# Patient Record
Sex: MALE | Race: WHITE
[De-identification: names, ages, dates, MRNs, and addresses within clinical notes are randomized per-mention and may not be internally consistent; named-entity substitution may affect disease eponyms.]

---

## 2017-12-02 ENCOUNTER — HOSPITAL ENCOUNTER (EMERGENCY)
Dept: HOSPITAL 8 - ED | Age: 27
LOS: 1 days | Discharge: HOME | End: 2017-12-03
Payer: COMMERCIAL

## 2017-12-02 VITALS — HEIGHT: 69 IN | BODY MASS INDEX: 25.18 KG/M2 | WEIGHT: 169.98 LBS

## 2017-12-02 DIAGNOSIS — B34.9: Primary | ICD-10-CM

## 2017-12-02 DIAGNOSIS — N28.9: ICD-10-CM

## 2017-12-02 LAB
HCT VFR BLD CALC: 56.3 % (ref 39.2–51.8)
HGB BLD-MCNC: 19.2 G/DL (ref 13.7–18)
WBC # BLD AUTO: 10.7 X10^3/UL (ref 3.4–10)

## 2017-12-02 PROCEDURE — 96361 HYDRATE IV INFUSION ADD-ON: CPT

## 2017-12-02 PROCEDURE — 80053 COMPREHEN METABOLIC PANEL: CPT

## 2017-12-02 PROCEDURE — 85025 COMPLETE CBC W/AUTO DIFF WBC: CPT

## 2017-12-02 PROCEDURE — 96374 THER/PROPH/DIAG INJ IV PUSH: CPT

## 2017-12-02 PROCEDURE — 36415 COLL VENOUS BLD VENIPUNCTURE: CPT

## 2017-12-02 PROCEDURE — 84132 ASSAY OF SERUM POTASSIUM: CPT

## 2017-12-02 PROCEDURE — 99285 EMERGENCY DEPT VISIT HI MDM: CPT

## 2017-12-02 PROCEDURE — 96375 TX/PRO/DX INJ NEW DRUG ADDON: CPT

## 2017-12-03 VITALS — DIASTOLIC BLOOD PRESSURE: 62 MMHG | SYSTOLIC BLOOD PRESSURE: 95 MMHG

## 2017-12-03 LAB
AST SERPL-CCNC: 32 U/L (ref 15–37)
BUN SERPL-MCNC: 18 MG/DL (ref 7–18)

## 2018-02-14 ENCOUNTER — HOSPITAL ENCOUNTER (OUTPATIENT)
Dept: HOSPITAL 8 - OUT | Age: 28
End: 2018-02-14
Attending: ORTHOPAEDIC SURGERY
Payer: MEDICAID

## 2018-02-14 VITALS — DIASTOLIC BLOOD PRESSURE: 78 MMHG | SYSTOLIC BLOOD PRESSURE: 118 MMHG

## 2018-02-14 VITALS — WEIGHT: 167.99 LBS | HEIGHT: 69 IN | BODY MASS INDEX: 24.88 KG/M2
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
## 2018-02-14 DIAGNOSIS — S42.002A: Primary | ICD-10-CM
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
## 2018-02-14 PROCEDURE — 76000 FLUOROSCOPY <1 HR PHYS/QHP: CPT

## 2018-02-14 PROCEDURE — 73000 X-RAY EXAM OF COLLAR BONE: CPT

## 2018-02-14 PROCEDURE — 23515 OPTX CLAVICULAR FX W/INT FIX: CPT

## 2018-02-14 PROCEDURE — C1713 ANCHOR/SCREW BN/BN,TIS/BN: HCPCS

## 2018-10-01 ENCOUNTER — HOSPITAL ENCOUNTER (EMERGENCY)
Dept: HOSPITAL 8 - ED | Age: 28
Discharge: HOME | End: 2018-10-01
Payer: MEDICAID

## 2018-10-01 VITALS — DIASTOLIC BLOOD PRESSURE: 74 MMHG | SYSTOLIC BLOOD PRESSURE: 162 MMHG

## 2018-10-01 VITALS — HEIGHT: 69 IN | WEIGHT: 169.76 LBS | BODY MASS INDEX: 25.14 KG/M2

## 2018-10-01 DIAGNOSIS — R06.02: ICD-10-CM

## 2018-10-01 DIAGNOSIS — F41.1: ICD-10-CM

## 2018-10-01 DIAGNOSIS — R07.89: Primary | ICD-10-CM

## 2018-10-01 LAB
ALBUMIN SERPL-MCNC: 4.1 G/DL (ref 3.4–5)
ALP SERPL-CCNC: 92 U/L (ref 45–117)
ALT SERPL-CCNC: 61 U/L (ref 12–78)
ANION GAP SERPL CALC-SCNC: 8 MMOL/L (ref 5–15)
BASOPHILS # BLD AUTO: 0.06 X10^3/UL (ref 0–0.1)
BASOPHILS NFR BLD AUTO: 1 % (ref 0–1)
BILIRUB SERPL-MCNC: 0.3 MG/DL (ref 0.2–1)
CALCIUM SERPL-MCNC: 9.4 MG/DL (ref 8.5–10.1)
CHLORIDE SERPL-SCNC: 109 MMOL/L (ref 98–107)
CREAT SERPL-MCNC: 1.05 MG/DL (ref 0.7–1.3)
EOSINOPHIL # BLD AUTO: 0.04 X10^3/UL (ref 0–0.4)
EOSINOPHIL NFR BLD AUTO: 1 % (ref 1–7)
ERYTHROCYTE [DISTWIDTH] IN BLOOD BY AUTOMATED COUNT: 13.1 % (ref 9.4–14.8)
LYMPHOCYTES # BLD AUTO: 1.5 X10^3/UL (ref 1–3.4)
LYMPHOCYTES NFR BLD AUTO: 28 % (ref 22–44)
MCH RBC QN AUTO: 31.2 PG (ref 27.5–34.5)
MCHC RBC AUTO-ENTMCNC: 33.9 G/DL (ref 33.2–36.2)
MCV RBC AUTO: 92 FL (ref 81–97)
MD: NO
MONOCYTES # BLD AUTO: 0.48 X10^3/UL (ref 0.2–0.8)
MONOCYTES NFR BLD AUTO: 9 % (ref 2–9)
NEUTROPHILS # BLD AUTO: 3.37 X10^3/UL (ref 1.8–6.8)
NEUTROPHILS NFR BLD AUTO: 62 % (ref 42–75)
PLATELET # BLD AUTO: 251 X10^3/UL (ref 130–400)
PMV BLD AUTO: 7.7 FL (ref 7.4–10.4)
PROT SERPL-MCNC: 7.3 G/DL (ref 6.4–8.2)
RBC # BLD AUTO: 4.99 X10^6/UL (ref 4.38–5.82)

## 2018-10-01 PROCEDURE — 96374 THER/PROPH/DIAG INJ IV PUSH: CPT

## 2018-10-01 PROCEDURE — 71046 X-RAY EXAM CHEST 2 VIEWS: CPT

## 2018-10-01 PROCEDURE — 85025 COMPLETE CBC W/AUTO DIFF WBC: CPT

## 2018-10-01 PROCEDURE — 99285 EMERGENCY DEPT VISIT HI MDM: CPT

## 2018-10-01 PROCEDURE — 36415 COLL VENOUS BLD VENIPUNCTURE: CPT

## 2018-10-01 PROCEDURE — 83690 ASSAY OF LIPASE: CPT

## 2018-10-01 PROCEDURE — 83735 ASSAY OF MAGNESIUM: CPT

## 2018-10-01 PROCEDURE — 80053 COMPREHEN METABOLIC PANEL: CPT

## 2018-10-22 ENCOUNTER — HOSPITAL ENCOUNTER (EMERGENCY)
Dept: HOSPITAL 8 - ED | Age: 28
Discharge: HOME | End: 2018-10-22
Payer: MEDICAID

## 2018-10-22 VITALS — SYSTOLIC BLOOD PRESSURE: 140 MMHG | DIASTOLIC BLOOD PRESSURE: 91 MMHG

## 2018-10-22 VITALS — HEIGHT: 68 IN | WEIGHT: 168.21 LBS | BODY MASS INDEX: 25.49 KG/M2

## 2018-10-22 DIAGNOSIS — R10.84: ICD-10-CM

## 2018-10-22 DIAGNOSIS — R11.2: Primary | ICD-10-CM

## 2018-10-22 DIAGNOSIS — R19.7: ICD-10-CM

## 2018-10-22 DIAGNOSIS — F41.1: ICD-10-CM

## 2018-10-22 LAB
ALBUMIN SERPL-MCNC: 4.6 G/DL (ref 3.4–5)
ALP SERPL-CCNC: 89 U/L (ref 45–117)
ALT SERPL-CCNC: 48 U/L (ref 12–78)
ANION GAP SERPL CALC-SCNC: 5 MMOL/L (ref 5–15)
BASOPHILS # BLD AUTO: 0.03 X10^3/UL (ref 0–0.1)
BASOPHILS NFR BLD AUTO: 1 % (ref 0–1)
BILIRUB SERPL-MCNC: 0.6 MG/DL (ref 0.2–1)
CALCIUM SERPL-MCNC: 9.7 MG/DL (ref 8.5–10.1)
CHLORIDE SERPL-SCNC: 105 MMOL/L (ref 98–107)
CREAT SERPL-MCNC: 1.19 MG/DL (ref 0.7–1.3)
EOSINOPHIL # BLD AUTO: 0.02 X10^3/UL (ref 0–0.4)
EOSINOPHIL NFR BLD AUTO: 0 % (ref 1–7)
ERYTHROCYTE [DISTWIDTH] IN BLOOD BY AUTOMATED COUNT: 12.9 % (ref 9.4–14.8)
LYMPHOCYTES # BLD AUTO: 1.36 X10^3/UL (ref 1–3.4)
LYMPHOCYTES NFR BLD AUTO: 19 % (ref 22–44)
MCH RBC QN AUTO: 31.9 PG (ref 27.5–34.5)
MCHC RBC AUTO-ENTMCNC: 34.5 G/DL (ref 33.2–36.2)
MCV RBC AUTO: 92.5 FL (ref 81–97)
MD: NO
MONOCYTES # BLD AUTO: 0.44 X10^3/UL (ref 0.2–0.8)
MONOCYTES NFR BLD AUTO: 6 % (ref 2–9)
NEUTROPHILS # BLD AUTO: 5.19 X10^3/UL (ref 1.8–6.8)
NEUTROPHILS NFR BLD AUTO: 74 % (ref 42–75)
PLATELET # BLD AUTO: 236 X10^3/UL (ref 130–400)
PMV BLD AUTO: 8.2 FL (ref 7.4–10.4)
PROT SERPL-MCNC: 8.3 G/DL (ref 6.4–8.2)
RBC # BLD AUTO: 5.33 X10^6/UL (ref 4.38–5.82)

## 2018-10-22 PROCEDURE — 83690 ASSAY OF LIPASE: CPT

## 2018-10-22 PROCEDURE — 80053 COMPREHEN METABOLIC PANEL: CPT

## 2018-10-22 PROCEDURE — 99284 EMERGENCY DEPT VISIT MOD MDM: CPT

## 2018-10-22 PROCEDURE — 85025 COMPLETE CBC W/AUTO DIFF WBC: CPT

## 2018-10-22 PROCEDURE — 36415 COLL VENOUS BLD VENIPUNCTURE: CPT

## 2019-11-22 ENCOUNTER — HOSPITAL ENCOUNTER (INPATIENT)
Dept: HOSPITAL 8 - ED | Age: 29
LOS: 5 days | Discharge: HOME | DRG: 872 | End: 2019-11-27
Attending: HOSPITALIST | Admitting: INTERNAL MEDICINE
Payer: MEDICAID

## 2019-11-22 VITALS — BODY MASS INDEX: 23.93 KG/M2 | HEIGHT: 69 IN | WEIGHT: 161.6 LBS

## 2019-11-22 VITALS — DIASTOLIC BLOOD PRESSURE: 73 MMHG | SYSTOLIC BLOOD PRESSURE: 116 MMHG

## 2019-11-22 DIAGNOSIS — A41.9: Primary | ICD-10-CM

## 2019-11-22 DIAGNOSIS — L03.116: ICD-10-CM

## 2019-11-22 DIAGNOSIS — F41.1: ICD-10-CM

## 2019-11-22 LAB
ALBUMIN SERPL-MCNC: 2.8 G/DL (ref 3.4–5)
ANION GAP SERPL CALC-SCNC: 3 MMOL/L (ref 5–15)
BASOPHILS # BLD AUTO: 0.03 X10^3/UL (ref 0–0.1)
BASOPHILS NFR BLD AUTO: 0 % (ref 0–1)
CALCIUM SERPL-MCNC: 8.5 MG/DL (ref 8.5–10.1)
CHLORIDE SERPL-SCNC: 107 MMOL/L (ref 98–107)
CREAT SERPL-MCNC: 1.05 MG/DL (ref 0.7–1.3)
EOSINOPHIL # BLD AUTO: 0 X10^3/UL (ref 0–0.4)
EOSINOPHIL NFR BLD AUTO: 0 % (ref 1–7)
ERYTHROCYTE [DISTWIDTH] IN BLOOD BY AUTOMATED COUNT: 12.5 % (ref 9.4–14.8)
LYMPHOCYTES # BLD AUTO: 0.94 X10^3/UL (ref 1–3.4)
LYMPHOCYTES NFR BLD AUTO: 7 % (ref 22–44)
MCH RBC QN AUTO: 31.8 PG (ref 27.5–34.5)
MCHC RBC AUTO-ENTMCNC: 33.4 G/DL (ref 33.2–36.2)
MCV RBC AUTO: 95.2 FL (ref 81–97)
MD: NO
MONOCYTES # BLD AUTO: 0.93 X10^3/UL (ref 0.2–0.8)
MONOCYTES NFR BLD AUTO: 7 % (ref 2–9)
NEUTROPHILS # BLD AUTO: 12.22 X10^3/UL (ref 1.8–6.8)
NEUTROPHILS NFR BLD AUTO: 87 % (ref 42–75)
PLATELET # BLD AUTO: 223 X10^3/UL (ref 130–400)
PMV BLD AUTO: 8.2 FL (ref 7.4–10.4)
RBC # BLD AUTO: 4.18 X10^6/UL (ref 4.38–5.82)

## 2019-11-22 PROCEDURE — 93005 ELECTROCARDIOGRAM TRACING: CPT

## 2019-11-22 PROCEDURE — 80048 BASIC METABOLIC PNL TOTAL CA: CPT

## 2019-11-22 PROCEDURE — 87205 SMEAR GRAM STAIN: CPT

## 2019-11-22 PROCEDURE — 99285 EMERGENCY DEPT VISIT HI MDM: CPT

## 2019-11-22 PROCEDURE — 80202 ASSAY OF VANCOMYCIN: CPT

## 2019-11-22 PROCEDURE — 80053 COMPREHEN METABOLIC PANEL: CPT

## 2019-11-22 PROCEDURE — 87070 CULTURE OTHR SPECIMN AEROBIC: CPT

## 2019-11-22 PROCEDURE — 85025 COMPLETE CBC W/AUTO DIFF WBC: CPT

## 2019-11-22 PROCEDURE — 87147 CULTURE TYPE IMMUNOLOGIC: CPT

## 2019-11-22 PROCEDURE — A9575 INJ GADOTERATE MEGLUMI 0.1ML: HCPCS

## 2019-11-22 PROCEDURE — 83605 ASSAY OF LACTIC ACID: CPT

## 2019-11-22 PROCEDURE — 36415 COLL VENOUS BLD VENIPUNCTURE: CPT

## 2019-11-22 PROCEDURE — 82040 ASSAY OF SERUM ALBUMIN: CPT

## 2019-11-22 PROCEDURE — 87040 BLOOD CULTURE FOR BACTERIA: CPT

## 2019-11-22 PROCEDURE — 96365 THER/PROPH/DIAG IV INF INIT: CPT

## 2019-11-22 RX ADMIN — MORPHINE SULFATE PRN MG: 4 INJECTION INTRAVENOUS at 21:11

## 2019-11-22 RX ADMIN — MORPHINE SULFATE PRN MG: 4 INJECTION INTRAVENOUS at 19:55

## 2019-11-22 NOTE — NUR
PT AWARE OF ADMISSION. 2ND LITER HANGING. PT WAS RESTING CALMLY DROWSING, STS 
PAIN IS 9/10 WHEN ASKED BUT STS FEELS BETTER AFTER MORPHINE. AS

## 2019-11-22 NOTE — NUR
PT TO ED W/ ROOMMATE. PORCELAIN PLATE EXPLODED WHILE BURNING INCENSE AT HOME 
TWO DAYS AGO, PIECE STRUCK L FOOT. L FOOT VERY SWOLLEN, RED, WARM. 2+ POST TIB 
PULSE. RED STREAKING UP L INNER LEG AND L INNER THIGH. AFEBRILE. 10/10 PAIN. 
SMALL BLACK TIESHA ON TOP OF FOOT. NO EXUDATE. DOES NOT KNOW IF PIECE IS LODGED 
IN FOOT. 

LESIONS/RED SPOTS/CRUSTY YELLOW SCABS TO R WRIST, L FOREARM, R CHIN X2 WEEKS. 
STS GOES TO SPA AND HOT TUB AT [facility] FREQUENTLY.

MD AT BEDSIDE.

LABS AND BC X2

IVF AND ABX AND MS FOR PAIN PER MAR.

AWAITING XR RESULTS

FOOT ELEVATED

ST ON MONITOR. 

CALL BELL IN REACH. CTM. AS

## 2019-11-23 VITALS — SYSTOLIC BLOOD PRESSURE: 112 MMHG | DIASTOLIC BLOOD PRESSURE: 72 MMHG

## 2019-11-23 VITALS — DIASTOLIC BLOOD PRESSURE: 80 MMHG | SYSTOLIC BLOOD PRESSURE: 120 MMHG

## 2019-11-23 VITALS — DIASTOLIC BLOOD PRESSURE: 76 MMHG | SYSTOLIC BLOOD PRESSURE: 141 MMHG

## 2019-11-23 VITALS — SYSTOLIC BLOOD PRESSURE: 130 MMHG | DIASTOLIC BLOOD PRESSURE: 79 MMHG

## 2019-11-23 RX ADMIN — HYDROCODONE BITARTRATE AND ACETAMINOPHEN PRN TAB: 5; 325 TABLET ORAL at 14:44

## 2019-11-23 RX ADMIN — HYDROCODONE BITARTRATE AND ACETAMINOPHEN PRN TAB: 5; 325 TABLET ORAL at 10:41

## 2019-11-23 RX ADMIN — AMPICILLIN SODIUM AND SULBACTAM SODIUM SCH MLS/HR: 2; 1 INJECTION, POWDER, FOR SOLUTION INTRAMUSCULAR; INTRAVENOUS at 08:47

## 2019-11-23 RX ADMIN — AMPICILLIN SODIUM AND SULBACTAM SODIUM SCH MLS/HR: 2; 1 INJECTION, POWDER, FOR SOLUTION INTRAMUSCULAR; INTRAVENOUS at 19:32

## 2019-11-23 RX ADMIN — HYDROCODONE BITARTRATE AND ACETAMINOPHEN PRN TAB: 5; 325 TABLET ORAL at 02:32

## 2019-11-23 RX ADMIN — VANCOMYCIN HYDROCHLORIDE SCH MLS/HR: 1 INJECTION, POWDER, LYOPHILIZED, FOR SOLUTION INTRAVENOUS at 12:36

## 2019-11-23 RX ADMIN — AMPICILLIN SODIUM AND SULBACTAM SODIUM SCH MLS/HR: 2; 1 INJECTION, POWDER, FOR SOLUTION INTRAMUSCULAR; INTRAVENOUS at 14:44

## 2019-11-23 RX ADMIN — AMPICILLIN SODIUM AND SULBACTAM SODIUM SCH MLS/HR: 2; 1 INJECTION, POWDER, FOR SOLUTION INTRAMUSCULAR; INTRAVENOUS at 02:31

## 2019-11-23 RX ADMIN — HYDROCODONE BITARTRATE AND ACETAMINOPHEN PRN TAB: 5; 325 TABLET ORAL at 19:33

## 2019-11-23 RX ADMIN — VANCOMYCIN HYDROCHLORIDE SCH MLS/HR: 1 INJECTION, POWDER, LYOPHILIZED, FOR SOLUTION INTRAVENOUS at 00:13

## 2019-11-24 VITALS — SYSTOLIC BLOOD PRESSURE: 107 MMHG | DIASTOLIC BLOOD PRESSURE: 69 MMHG

## 2019-11-24 VITALS — SYSTOLIC BLOOD PRESSURE: 125 MMHG | DIASTOLIC BLOOD PRESSURE: 81 MMHG

## 2019-11-24 VITALS — DIASTOLIC BLOOD PRESSURE: 73 MMHG | SYSTOLIC BLOOD PRESSURE: 114 MMHG

## 2019-11-24 VITALS — SYSTOLIC BLOOD PRESSURE: 126 MMHG | DIASTOLIC BLOOD PRESSURE: 79 MMHG

## 2019-11-24 LAB
ANION GAP SERPL CALC-SCNC: 3 MMOL/L (ref 5–15)
BASOPHILS # BLD AUTO: 0.03 X10^3/UL (ref 0–0.1)
BASOPHILS NFR BLD AUTO: 0 % (ref 0–1)
CALCIUM SERPL-MCNC: 9.1 MG/DL (ref 8.5–10.1)
CHLORIDE SERPL-SCNC: 107 MMOL/L (ref 98–107)
CREAT SERPL-MCNC: 0.8 MG/DL (ref 0.7–1.3)
EOSINOPHIL # BLD AUTO: 0.14 X10^3/UL (ref 0–0.4)
EOSINOPHIL NFR BLD AUTO: 1 % (ref 1–7)
ERYTHROCYTE [DISTWIDTH] IN BLOOD BY AUTOMATED COUNT: 12.5 % (ref 9.4–14.8)
LYMPHOCYTES # BLD AUTO: 1.69 X10^3/UL (ref 1–3.4)
LYMPHOCYTES NFR BLD AUTO: 16 % (ref 22–44)
MCH RBC QN AUTO: 31.6 PG (ref 27.5–34.5)
MCHC RBC AUTO-ENTMCNC: 33.1 G/DL (ref 33.2–36.2)
MCV RBC AUTO: 95.6 FL (ref 81–97)
MD: NO
MONOCYTES # BLD AUTO: 0.82 X10^3/UL (ref 0.2–0.8)
MONOCYTES NFR BLD AUTO: 8 % (ref 2–9)
NEUTROPHILS # BLD AUTO: 7.75 X10^3/UL (ref 1.8–6.8)
NEUTROPHILS NFR BLD AUTO: 74 % (ref 42–75)
PLATELET # BLD AUTO: 203 X10^3/UL (ref 130–400)
PMV BLD AUTO: 8.2 FL (ref 7.4–10.4)
RBC # BLD AUTO: 4.23 X10^6/UL (ref 4.38–5.82)

## 2019-11-24 RX ADMIN — KETOROLAC TROMETHAMINE SCH MG: 30 INJECTION, SOLUTION INTRAMUSCULAR at 17:51

## 2019-11-24 RX ADMIN — HYDROCODONE BITARTRATE AND ACETAMINOPHEN PRN TAB: 5; 325 TABLET ORAL at 00:15

## 2019-11-24 RX ADMIN — KETOROLAC TROMETHAMINE SCH MG: 30 INJECTION, SOLUTION INTRAMUSCULAR at 12:41

## 2019-11-24 RX ADMIN — HYDROCODONE BITARTRATE AND ACETAMINOPHEN PRN TAB: 5; 325 TABLET ORAL at 17:51

## 2019-11-24 RX ADMIN — HYDROCODONE BITARTRATE AND ACETAMINOPHEN PRN TAB: 5; 325 TABLET ORAL at 08:26

## 2019-11-24 RX ADMIN — CEFTRIAXONE SCH MLS/HR: 1 INJECTION, SOLUTION INTRAVENOUS at 11:44

## 2019-11-24 RX ADMIN — AMPICILLIN SODIUM AND SULBACTAM SODIUM SCH MLS/HR: 2; 1 INJECTION, POWDER, FOR SOLUTION INTRAMUSCULAR; INTRAVENOUS at 03:02

## 2019-11-24 RX ADMIN — VANCOMYCIN HYDROCHLORIDE SCH MLS/HR: 1 INJECTION, POWDER, LYOPHILIZED, FOR SOLUTION INTRAVENOUS at 00:16

## 2019-11-24 RX ADMIN — HYDROCODONE BITARTRATE AND ACETAMINOPHEN PRN TAB: 5; 325 TABLET ORAL at 12:41

## 2019-11-24 RX ADMIN — HYDROCODONE BITARTRATE AND ACETAMINOPHEN PRN TAB: 5; 325 TABLET ORAL at 22:19

## 2019-11-24 RX ADMIN — AMPICILLIN SODIUM AND SULBACTAM SODIUM SCH MLS/HR: 2; 1 INJECTION, POWDER, FOR SOLUTION INTRAMUSCULAR; INTRAVENOUS at 08:17

## 2019-11-25 VITALS — SYSTOLIC BLOOD PRESSURE: 123 MMHG | DIASTOLIC BLOOD PRESSURE: 78 MMHG

## 2019-11-25 VITALS — SYSTOLIC BLOOD PRESSURE: 115 MMHG | DIASTOLIC BLOOD PRESSURE: 77 MMHG

## 2019-11-25 VITALS — DIASTOLIC BLOOD PRESSURE: 78 MMHG | SYSTOLIC BLOOD PRESSURE: 130 MMHG

## 2019-11-25 VITALS — DIASTOLIC BLOOD PRESSURE: 83 MMHG | SYSTOLIC BLOOD PRESSURE: 127 MMHG

## 2019-11-25 VITALS — DIASTOLIC BLOOD PRESSURE: 73 MMHG | SYSTOLIC BLOOD PRESSURE: 114 MMHG

## 2019-11-25 LAB
ALBUMIN SERPL-MCNC: 2.6 G/DL (ref 3.4–5)
ALP SERPL-CCNC: 85 U/L (ref 45–117)
ALT SERPL-CCNC: 43 U/L (ref 12–78)
ANION GAP SERPL CALC-SCNC: 6 MMOL/L (ref 5–15)
BASOPHILS # BLD AUTO: 0.03 X10^3/UL (ref 0–0.1)
BASOPHILS NFR BLD AUTO: 0 % (ref 0–1)
BILIRUB SERPL-MCNC: 0.3 MG/DL (ref 0.2–1)
CALCIUM SERPL-MCNC: 9.2 MG/DL (ref 8.5–10.1)
CHLORIDE SERPL-SCNC: 106 MMOL/L (ref 98–107)
CREAT SERPL-MCNC: 0.87 MG/DL (ref 0.7–1.3)
EOSINOPHIL # BLD AUTO: 0.07 X10^3/UL (ref 0–0.4)
EOSINOPHIL NFR BLD AUTO: 1 % (ref 1–7)
ERYTHROCYTE [DISTWIDTH] IN BLOOD BY AUTOMATED COUNT: 12.4 % (ref 9.4–14.8)
LYMPHOCYTES # BLD AUTO: 1.66 X10^3/UL (ref 1–3.4)
LYMPHOCYTES NFR BLD AUTO: 15 % (ref 22–44)
MCH RBC QN AUTO: 30.9 PG (ref 27.5–34.5)
MCHC RBC AUTO-ENTMCNC: 32.8 G/DL (ref 33.2–36.2)
MCV RBC AUTO: 94.2 FL (ref 81–97)
MD: NO
MONOCYTES # BLD AUTO: 0.88 X10^3/UL (ref 0.2–0.8)
MONOCYTES NFR BLD AUTO: 8 % (ref 2–9)
NEUTROPHILS # BLD AUTO: 8.45 X10^3/UL (ref 1.8–6.8)
NEUTROPHILS NFR BLD AUTO: 76 % (ref 42–75)
PLATELET # BLD AUTO: 273 X10^3/UL (ref 130–400)
PMV BLD AUTO: 7.6 FL (ref 7.4–10.4)
PROT SERPL-MCNC: 7 G/DL (ref 6.4–8.2)
RBC # BLD AUTO: 4.27 X10^6/UL (ref 4.38–5.82)

## 2019-11-25 RX ADMIN — HYDROCODONE BITARTRATE AND ACETAMINOPHEN PRN TAB: 5; 325 TABLET ORAL at 10:44

## 2019-11-25 RX ADMIN — HYDROCODONE BITARTRATE AND ACETAMINOPHEN PRN TAB: 5; 325 TABLET ORAL at 22:27

## 2019-11-25 RX ADMIN — KETOROLAC TROMETHAMINE SCH MG: 30 INJECTION, SOLUTION INTRAMUSCULAR at 06:34

## 2019-11-25 RX ADMIN — KETOROLAC TROMETHAMINE SCH MG: 30 INJECTION, SOLUTION INTRAMUSCULAR at 18:19

## 2019-11-25 RX ADMIN — HYDROCODONE BITARTRATE AND ACETAMINOPHEN PRN TAB: 5; 325 TABLET ORAL at 14:33

## 2019-11-25 RX ADMIN — HYDROCODONE BITARTRATE AND ACETAMINOPHEN PRN TAB: 5; 325 TABLET ORAL at 18:20

## 2019-11-25 RX ADMIN — HYDROCODONE BITARTRATE AND ACETAMINOPHEN PRN TAB: 5; 325 TABLET ORAL at 06:35

## 2019-11-25 RX ADMIN — KETOROLAC TROMETHAMINE SCH MG: 30 INJECTION, SOLUTION INTRAMUSCULAR at 00:16

## 2019-11-25 RX ADMIN — CEFTRIAXONE SCH MLS/HR: 1 INJECTION, SOLUTION INTRAVENOUS at 12:34

## 2019-11-25 RX ADMIN — KETOROLAC TROMETHAMINE SCH MG: 30 INJECTION, SOLUTION INTRAMUSCULAR at 12:34

## 2019-11-26 VITALS — DIASTOLIC BLOOD PRESSURE: 74 MMHG | SYSTOLIC BLOOD PRESSURE: 117 MMHG

## 2019-11-26 VITALS — DIASTOLIC BLOOD PRESSURE: 80 MMHG | SYSTOLIC BLOOD PRESSURE: 126 MMHG

## 2019-11-26 VITALS — DIASTOLIC BLOOD PRESSURE: 79 MMHG | SYSTOLIC BLOOD PRESSURE: 124 MMHG

## 2019-11-26 VITALS — SYSTOLIC BLOOD PRESSURE: 120 MMHG | DIASTOLIC BLOOD PRESSURE: 76 MMHG

## 2019-11-26 LAB
ALBUMIN SERPL-MCNC: 2.6 G/DL (ref 3.4–5)
ALP SERPL-CCNC: 75 U/L (ref 45–117)
ALT SERPL-CCNC: 47 U/L (ref 12–78)
ANION GAP SERPL CALC-SCNC: 5 MMOL/L (ref 5–15)
BASOPHILS # BLD AUTO: 0.04 X10^3/UL (ref 0–0.1)
BASOPHILS NFR BLD AUTO: 1 % (ref 0–1)
BILIRUB SERPL-MCNC: 0.3 MG/DL (ref 0.2–1)
CALCIUM SERPL-MCNC: 9.6 MG/DL (ref 8.5–10.1)
CHLORIDE SERPL-SCNC: 106 MMOL/L (ref 98–107)
CREAT SERPL-MCNC: 0.92 MG/DL (ref 0.7–1.3)
EOSINOPHIL # BLD AUTO: 0.08 X10^3/UL (ref 0–0.4)
EOSINOPHIL NFR BLD AUTO: 1 % (ref 1–7)
ERYTHROCYTE [DISTWIDTH] IN BLOOD BY AUTOMATED COUNT: 12.3 % (ref 9.4–14.8)
LYMPHOCYTES # BLD AUTO: 1.75 X10^3/UL (ref 1–3.4)
LYMPHOCYTES NFR BLD AUTO: 25 % (ref 22–44)
MCH RBC QN AUTO: 30.8 PG (ref 27.5–34.5)
MCHC RBC AUTO-ENTMCNC: 33 G/DL (ref 33.2–36.2)
MCV RBC AUTO: 93.4 FL (ref 81–97)
MD: NO
MONOCYTES # BLD AUTO: 0.64 X10^3/UL (ref 0.2–0.8)
MONOCYTES NFR BLD AUTO: 9 % (ref 2–9)
NEUTROPHILS # BLD AUTO: 4.43 X10^3/UL (ref 1.8–6.8)
NEUTROPHILS NFR BLD AUTO: 64 % (ref 42–75)
PLATELET # BLD AUTO: 281 X10^3/UL (ref 130–400)
PMV BLD AUTO: 7.6 FL (ref 7.4–10.4)
PROT SERPL-MCNC: 7.3 G/DL (ref 6.4–8.2)
RBC # BLD AUTO: 4.4 X10^6/UL (ref 4.38–5.82)

## 2019-11-26 RX ADMIN — CEFTRIAXONE SCH MLS/HR: 1 INJECTION, SOLUTION INTRAVENOUS at 12:19

## 2019-11-26 RX ADMIN — KETOROLAC TROMETHAMINE SCH MG: 30 INJECTION, SOLUTION INTRAMUSCULAR at 00:07

## 2019-11-26 RX ADMIN — HYDROCODONE BITARTRATE AND ACETAMINOPHEN PRN TAB: 5; 325 TABLET ORAL at 06:18

## 2019-11-26 RX ADMIN — KETOROLAC TROMETHAMINE SCH MG: 30 INJECTION, SOLUTION INTRAMUSCULAR at 17:05

## 2019-11-26 RX ADMIN — HYDROCODONE BITARTRATE AND ACETAMINOPHEN PRN TAB: 5; 325 TABLET ORAL at 12:19

## 2019-11-26 RX ADMIN — HYDROCODONE BITARTRATE AND ACETAMINOPHEN PRN TAB: 5; 325 TABLET ORAL at 17:06

## 2019-11-26 RX ADMIN — KETOROLAC TROMETHAMINE SCH MG: 30 INJECTION, SOLUTION INTRAMUSCULAR at 06:18

## 2019-11-26 RX ADMIN — KETOROLAC TROMETHAMINE SCH MG: 30 INJECTION, SOLUTION INTRAMUSCULAR at 12:19

## 2019-11-27 VITALS — SYSTOLIC BLOOD PRESSURE: 122 MMHG | DIASTOLIC BLOOD PRESSURE: 74 MMHG

## 2019-11-27 VITALS — DIASTOLIC BLOOD PRESSURE: 87 MMHG | SYSTOLIC BLOOD PRESSURE: 135 MMHG

## 2019-11-27 RX ADMIN — KETOROLAC TROMETHAMINE SCH MG: 30 INJECTION, SOLUTION INTRAMUSCULAR at 11:52

## 2019-11-27 RX ADMIN — HYDROCODONE BITARTRATE AND ACETAMINOPHEN PRN TAB: 5; 325 TABLET ORAL at 06:20

## 2019-11-27 RX ADMIN — HYDROCODONE BITARTRATE AND ACETAMINOPHEN PRN TAB: 5; 325 TABLET ORAL at 00:32

## 2019-11-27 RX ADMIN — KETOROLAC TROMETHAMINE SCH MG: 30 INJECTION, SOLUTION INTRAMUSCULAR at 06:20

## 2019-11-27 RX ADMIN — KETOROLAC TROMETHAMINE SCH MG: 30 INJECTION, SOLUTION INTRAMUSCULAR at 00:33

## 2020-01-14 ENCOUNTER — HOSPITAL ENCOUNTER (EMERGENCY)
Dept: HOSPITAL 8 - ED | Age: 30
Discharge: HOME | End: 2020-01-14
Payer: MEDICAID

## 2020-01-14 VITALS — BODY MASS INDEX: 24.16 KG/M2 | HEIGHT: 69 IN | WEIGHT: 163.14 LBS

## 2020-01-14 VITALS — DIASTOLIC BLOOD PRESSURE: 88 MMHG | SYSTOLIC BLOOD PRESSURE: 135 MMHG

## 2020-01-14 DIAGNOSIS — F11.10: ICD-10-CM
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
## 2020-01-14 DIAGNOSIS — F10.10: Primary | ICD-10-CM

## 2020-01-14 DIAGNOSIS — F15.10: ICD-10-CM

## 2020-01-14 DIAGNOSIS — F41.9: ICD-10-CM

## 2020-01-14 LAB
ALBUMIN SERPL-MCNC: 4.2 G/DL (ref 3.4–5)
ALP SERPL-CCNC: 89 U/L (ref 45–117)
ALT SERPL-CCNC: 34 U/L (ref 12–78)
ANION GAP SERPL CALC-SCNC: 7 MMOL/L (ref 5–15)
BASOPHILS # BLD AUTO: 0.02 X10^3/UL (ref 0–0.1)
BASOPHILS NFR BLD AUTO: 0 % (ref 0–1)
BILIRUB SERPL-MCNC: 0.3 MG/DL (ref 0.2–1)
CALCIUM SERPL-MCNC: 9.5 MG/DL (ref 8.5–10.1)
CHLORIDE SERPL-SCNC: 104 MMOL/L (ref 98–107)
CREAT SERPL-MCNC: 1.04 MG/DL (ref 0.7–1.3)
EOSINOPHIL # BLD AUTO: 0.04 X10^3/UL (ref 0–0.4)
EOSINOPHIL NFR BLD AUTO: 1 % (ref 1–7)
ERYTHROCYTE [DISTWIDTH] IN BLOOD BY AUTOMATED COUNT: 13.1 % (ref 9.4–14.8)
LYMPHOCYTES # BLD AUTO: 1.73 X10^3/UL (ref 1–3.4)
LYMPHOCYTES NFR BLD AUTO: 27 % (ref 22–44)
MCH RBC QN AUTO: 30.9 PG (ref 27.5–34.5)
MCHC RBC AUTO-ENTMCNC: 33.5 G/DL (ref 33.2–36.2)
MCV RBC AUTO: 92.3 FL (ref 81–97)
MD: NO
MONOCYTES # BLD AUTO: 0.45 X10^3/UL (ref 0.2–0.8)
MONOCYTES NFR BLD AUTO: 7 % (ref 2–9)
NEUTROPHILS # BLD AUTO: 4.09 X10^3/UL (ref 1.8–6.8)
NEUTROPHILS NFR BLD AUTO: 65 % (ref 42–75)
PLATELET # BLD AUTO: 282 X10^3/UL (ref 130–400)
PMV BLD AUTO: 7.7 FL (ref 7.4–10.4)
PROT SERPL-MCNC: 7.7 G/DL (ref 6.4–8.2)
RBC # BLD AUTO: 4.81 X10^6/UL (ref 4.38–5.82)
TROPONIN I SERPL-MCNC: < 0.015 NG/ML (ref 0–0.04)

## 2020-01-14 PROCEDURE — 84484 ASSAY OF TROPONIN QUANT: CPT

## 2020-01-14 PROCEDURE — 71045 X-RAY EXAM CHEST 1 VIEW: CPT

## 2020-01-14 PROCEDURE — 36415 COLL VENOUS BLD VENIPUNCTURE: CPT

## 2020-01-14 PROCEDURE — 93005 ELECTROCARDIOGRAM TRACING: CPT

## 2020-01-14 PROCEDURE — 80053 COMPREHEN METABOLIC PANEL: CPT

## 2020-01-14 PROCEDURE — 99284 EMERGENCY DEPT VISIT MOD MDM: CPT

## 2020-01-14 PROCEDURE — 85025 COMPLETE CBC W/AUTO DIFF WBC: CPT

## 2020-01-14 PROCEDURE — 83690 ASSAY OF LIPASE: CPT

## 2020-01-14 PROCEDURE — 96372 THER/PROPH/DIAG INJ SC/IM: CPT

## 2020-01-14 NOTE — NUR
PT TO RM FROM TRIAGE AT THIS TIME. PT PLACED ON ALL MOINTORING EQUIPMENT. 



PT WITH C/O "GOING TO CRAZY, I DRANK ENOUGHT TO KILL A HORSE" PT STATES HE HAS 
BEEN DRINKING FOR SEVERAL DAYS AND USING ELICIT DRUGS INCLUDING HEROIN AND 
METH. PT STATES HE FEELS TIGHTNESS IN HIS CHEST AND HAVING HOT ATTACKS. PT 
DENIES SOB AT THIS TIME. ASKED PT IN DEPTH IF THIS WAS AN INTENTIONAL OD OR IF 
PT INDORCED ANY SI. PT DENIES THIS OR HX OR PAST ATTEMPTS. PT WITH HX BIPOLAR 
AND HAS BEEN OFF HIS MEDS FOR SEVERAL DAYS PER HIS REPORT

## 2020-02-13 ENCOUNTER — HOSPITAL ENCOUNTER (EMERGENCY)
Facility: MEDICAL CENTER | Age: 30
End: 2020-02-13
Attending: EMERGENCY MEDICINE
Payer: MEDICAID

## 2020-02-13 VITALS
BODY MASS INDEX: 25.83 KG/M2 | OXYGEN SATURATION: 97 % | RESPIRATION RATE: 20 BRPM | TEMPERATURE: 99.5 F | HEART RATE: 84 BPM | WEIGHT: 174.38 LBS | SYSTOLIC BLOOD PRESSURE: 119 MMHG | HEIGHT: 69 IN | DIASTOLIC BLOOD PRESSURE: 71 MMHG

## 2020-02-13 DIAGNOSIS — R07.9 CHEST PAIN, UNSPECIFIED TYPE: ICD-10-CM

## 2020-02-13 DIAGNOSIS — F15.10 AMPHETAMINE ABUSE (HCC): ICD-10-CM

## 2020-02-13 LAB
ANION GAP SERPL CALC-SCNC: 7 MMOL/L (ref 0–11.9)
BASOPHILS # BLD AUTO: 0.5 % (ref 0–1.8)
BASOPHILS # BLD: 0.03 K/UL (ref 0–0.12)
BUN SERPL-MCNC: 16 MG/DL (ref 8–22)
CALCIUM SERPL-MCNC: 9.8 MG/DL (ref 8.5–10.5)
CHLORIDE SERPL-SCNC: 104 MMOL/L (ref 96–112)
CO2 SERPL-SCNC: 25 MMOL/L (ref 20–33)
CREAT SERPL-MCNC: 1.25 MG/DL (ref 0.5–1.4)
EKG IMPRESSION: NORMAL
EKG IMPRESSION: NORMAL
EOSINOPHIL # BLD AUTO: 0.03 K/UL (ref 0–0.51)
EOSINOPHIL NFR BLD: 0.5 % (ref 0–6.9)
ERYTHROCYTE [DISTWIDTH] IN BLOOD BY AUTOMATED COUNT: 40.7 FL (ref 35.9–50)
GLUCOSE SERPL-MCNC: 87 MG/DL (ref 65–99)
HCT VFR BLD AUTO: 40.5 % (ref 42–52)
HGB BLD-MCNC: 14.1 G/DL (ref 14–18)
IMM GRANULOCYTES # BLD AUTO: 0.02 K/UL (ref 0–0.11)
IMM GRANULOCYTES NFR BLD AUTO: 0.3 % (ref 0–0.9)
LYMPHOCYTES # BLD AUTO: 0.76 K/UL (ref 1–4.8)
LYMPHOCYTES NFR BLD: 13 % (ref 22–41)
MCH RBC QN AUTO: 31 PG (ref 27–33)
MCHC RBC AUTO-ENTMCNC: 34.8 G/DL (ref 33.7–35.3)
MCV RBC AUTO: 89 FL (ref 81.4–97.8)
MONOCYTES # BLD AUTO: 0.71 K/UL (ref 0–0.85)
MONOCYTES NFR BLD AUTO: 12.1 % (ref 0–13.4)
NEUTROPHILS # BLD AUTO: 4.3 K/UL (ref 1.82–7.42)
NEUTROPHILS NFR BLD: 73.6 % (ref 44–72)
NRBC # BLD AUTO: 0 K/UL
NRBC BLD-RTO: 0 /100 WBC
PLATELET # BLD AUTO: 178 K/UL (ref 164–446)
PMV BLD AUTO: 9.8 FL (ref 9–12.9)
POTASSIUM SERPL-SCNC: 4.1 MMOL/L (ref 3.6–5.5)
RBC # BLD AUTO: 4.55 M/UL (ref 4.7–6.1)
SODIUM SERPL-SCNC: 136 MMOL/L (ref 135–145)
TROPONIN T SERPL-MCNC: <6 NG/L (ref 6–19)
TROPONIN T SERPL-MCNC: <6 NG/L (ref 6–19)
WBC # BLD AUTO: 5.9 K/UL (ref 4.8–10.8)

## 2020-02-13 PROCEDURE — 80048 BASIC METABOLIC PNL TOTAL CA: CPT

## 2020-02-13 PROCEDURE — 700102 HCHG RX REV CODE 250 W/ 637 OVERRIDE(OP): Performed by: EMERGENCY MEDICINE

## 2020-02-13 PROCEDURE — 99285 EMERGENCY DEPT VISIT HI MDM: CPT

## 2020-02-13 PROCEDURE — 85025 COMPLETE CBC W/AUTO DIFF WBC: CPT

## 2020-02-13 PROCEDURE — A9270 NON-COVERED ITEM OR SERVICE: HCPCS | Performed by: EMERGENCY MEDICINE

## 2020-02-13 PROCEDURE — 93005 ELECTROCARDIOGRAM TRACING: CPT | Performed by: EMERGENCY MEDICINE

## 2020-02-13 PROCEDURE — 84484 ASSAY OF TROPONIN QUANT: CPT | Mod: 91

## 2020-02-13 PROCEDURE — 93005 ELECTROCARDIOGRAM TRACING: CPT

## 2020-02-13 RX ORDER — ACETAMINOPHEN 325 MG/1
650 TABLET ORAL ONCE
Status: COMPLETED | OUTPATIENT
Start: 2020-02-13 | End: 2020-02-13

## 2020-02-13 RX ADMIN — ACETAMINOPHEN 650 MG: 325 TABLET, FILM COATED ORAL at 06:00

## 2020-02-13 NOTE — ED NOTES
"Patient brought to room from triage. Pt placed on cardiac monitor. Pt reports a general unwell feeling \"Its my head, my chest my whole body\" Discussed with patient this might be a result of the meth and alcohol use.Patient states he has experienced similar symptoms with meth usage in the past.   "

## 2020-02-13 NOTE — ED PROVIDER NOTES
"ED Provider Note    ED Provider Note      Primary care provider: Mary Ann Pretty M.D.    CHIEF COMPLAINT  Chief Complaint   Patient presents with   • Drug Abuse     meth use tonight, \"my body is going crazy\"   • Chest Pain     tightness, worse with inhalation        HPI  Abdoul Medina is a 29 y.o. male who presents to the Emergency Department with chief complaint of chest pain.  Patient reports that he is been on alcohol brice for the last 2 days reports that he was using meth this evening in an effort to sober up from alcohol intoxication.  Shortly after the meth ingestion he reports crushing chest pain.  This was multiple hours prior.  He denies any other drug use he has no headache no vomiting does states that he is streaming nauseous and has some diffuse abdominal pain no fevers no chills no cough no shortness of breath no lower extremity pain edema no other recent illnesses pain is currently rated as a 5 out of 10 without modifying factors no other acute symptoms or concerns.    REVIEW OF SYSTEMS  10 systems reviewed and otherwise negative, pertinent positives and negatives listed in the history of present illness.    PAST MEDICAL HISTORY   has a past medical history of Acne (4/15/2013), Alcohol ingestion, more than 4 drinks/day (3/4/2016), and Warts (1/22/2013).    SURGICAL HISTORY  patient denies any surgical history    SOCIAL HISTORY  Social History     Tobacco Use   • Smoking status: Never Smoker   • Smokeless tobacco: Never Used   Substance Use Topics   • Alcohol use: Yes     Comment: drinking every day for last year   • Drug use: No      Social History     Substance and Sexual Activity   Drug Use No       FAMILY HISTORY  Non-Contributory    CURRENT MEDICATIONS  Home Medications    **Home medications have not yet been reviewed for this encounter**         ALLERGIES  Allergies   Allergen Reactions   • Nkda [No Known Drug Allergy]        PHYSICAL EXAM  VITAL SIGNS: /93   Pulse (!) 102   Temp 37.5 " "°C (99.5 °F)   Resp 20   Ht 1.753 m (5' 9\")   Wt 79.1 kg (174 lb 6.1 oz)   SpO2 96%   BMI 25.75 kg/m²   Pulse ox interpretation: I interpret this pulse ox as normal.  Constitutional: Alert and oriented x 3, no acute distress  HEENT: Atraumatic normocephalic, pupils are equal round reactive to light extraocular movements are intact. The nares is clear, external ears are normal, mouth shows moist mucous membranes  Neck: Supple, no JVD no tracheal deviation  Cardiovascular: Borderline tachycardic no murmur rub or gallop 2+ pulses peripherally x4  Thorax & Lungs: No respiratory distress, no wheezes rales or rhonchi, No chest tenderness.   GI: Soft nontender nondistended positive bowel sounds, no peritoneal signs  Skin: Warm dry no acute rash or lesion  Musculoskeletal: Moving all extremities with full range and 5 of 5 strength, no acute  deformity  Neurologic: Cranial nerves III through XII are grossly intact, no sensory deficit, no cerebellar dysfunction   Psychiatric: Appropriate affect for situation at this time      DIAGNOSTIC STUDIES / PROCEDURES  LABS      Results for orders placed or performed during the hospital encounter of 02/13/20   CBC w/ Differential   Result Value Ref Range    WBC 5.9 4.8 - 10.8 K/uL    RBC 4.55 (L) 4.70 - 6.10 M/uL    Hemoglobin 14.1 14.0 - 18.0 g/dL    Hematocrit 40.5 (L) 42.0 - 52.0 %    MCV 89.0 81.4 - 97.8 fL    MCH 31.0 27.0 - 33.0 pg    MCHC 34.8 33.7 - 35.3 g/dL    RDW 40.7 35.9 - 50.0 fL    Platelet Count 178 164 - 446 K/uL    MPV 9.8 9.0 - 12.9 fL    Neutrophils-Polys 73.60 (H) 44.00 - 72.00 %    Lymphocytes 13.00 (L) 22.00 - 41.00 %    Monocytes 12.10 0.00 - 13.40 %    Eosinophils 0.50 0.00 - 6.90 %    Basophils 0.50 0.00 - 1.80 %    Immature Granulocytes 0.30 0.00 - 0.90 %    Nucleated RBC 0.00 /100 WBC    Neutrophils (Absolute) 4.30 1.82 - 7.42 K/uL    Lymphs (Absolute) 0.76 (L) 1.00 - 4.80 K/uL    Monos (Absolute) 0.71 0.00 - 0.85 K/uL    Eos (Absolute) 0.03 0.00 - 0.51 " K/uL    Baso (Absolute) 0.03 0.00 - 0.12 K/uL    Immature Granulocytes (abs) 0.02 0.00 - 0.11 K/uL    NRBC (Absolute) 0.00 K/uL   Basic Metabolic Panel (BMP)   Result Value Ref Range    Sodium 136 135 - 145 mmol/L    Potassium 4.1 3.6 - 5.5 mmol/L    Chloride 104 96 - 112 mmol/L    Co2 25 20 - 33 mmol/L    Glucose 87 65 - 99 mg/dL    Bun 16 8 - 22 mg/dL    Creatinine 1.25 0.50 - 1.40 mg/dL    Calcium 9.8 8.5 - 10.5 mg/dL    Anion Gap 7.0 0.0 - 11.9   Troponin STAT   Result Value Ref Range    Troponin T <6 6 - 19 ng/L   Troponin in two (2) hours   Result Value Ref Range    Troponin T <6 6 - 19 ng/L   ESTIMATED GFR   Result Value Ref Range    GFR If African American >60 >60 mL/min/1.73 m 2    GFR If Non African American >60 >60 mL/min/1.73 m 2   EKG (NOW)   Result Value Ref Range    Report       [facility] Emergency Dept.    Test Date:  2020  Pt Name:    [facility]                  Department: ER  MRN:        [id number]                      Room:  Gender:     Male                         Technician: [id number]  :        1990                   Requested By:ER TRIAGE PROTOCOL  Order #:    [id number]                    Reading MD: TARA MIMS MD    Measurements  Intervals                                Axis  Rate:       100                          P:          70  FL:         140                          QRS:        67  QRSD:       84                           T:          54  QT:         340  QTc:        439    Interpretive Statements  SINUS TACHYCARDIA at 100, very minimal ST elevation V2 and V3 without  reciprocal  changes or other acute ischemic findings.    PROBABLE LEFT ATRIAL ABNORMALITY  No previous ECG available for comparison  Electronically Signed On 2020 2:45:47 PST by TARA MIMS MD     EKG   Result Value Ref Range    Report       [facility] Emergency Dept.    Test Date:  2020  Pt Name:    [facility]                  Department: ER  MRN:  "       [id number]                      Room:        22  Gender:     Male                         Technician: [id number]  :        1990                   Requested By:TARA MIMS  Order #:    [id number]                    Reading MD: TARA MIMS MD    Measurements  Intervals                                Axis  Rate:       99                           P:          65  LA:         140                          QRS:        60  QRSD:       82                           T:          48  QT:         340  QTc:        437    Interpretive Statements  SINUS RHYTHM  Compared to ECG 2020 01:59:35  Sinus tachycardia no longer present  Electronically Signed On 2020 2:45:21 PST by TARA MIMS MD         All labs reviewed by me.          COURSE & MEDICAL DECISION MAKING  Pertinent Labs & Imaging studies reviewed. (See chart for details)    2:44 AM - Patient seen and examined at bedside.       Patient noted to have slightly elevated blood pressure likely circumstantial secondary to presenting complaint. Referred to primary care physician for further evaluation.      Medical Decision Making: Patient initial EKG nonischemic initial troponin negative was observed for 2 hours repeat troponin negative repeat EKG negative patient given instructions on meth and alcohol cessation return for further chest pain shortness of breath any other acute symptoms or concerns otherwise discharged stable and improved condition.  Heart score of 1    /93   Pulse (!) 102   Temp 37.5 °C (99.5 °F)   Resp 20   Ht 1.753 m (5' 9\")   Wt 79.1 kg (174 lb 6.1 oz)   SpO2 96%   BMI 25.75 kg/m²     Mary Ann Pretty M.D.  [address] [place] [id number] [phone]          [facility], Emergency Dept  [address] [facility] [id number] [phone]    If symptoms worsen          FINAL IMPRESSION  1. Chest pain, unspecified type Active   2. Amphetamine abuse (HCC) Active          This " dictation has been created using voice recognition software and/or scribes. The accuracy of the dictation is limited by the abilities of the software and the expertise of the scribes. I expect there may be some errors of grammar and possibly content. I made every attempt to manually correct the errors within my dictation. However, errors related to voice recognition software and/or scribes may still exist and should be interpreted within the appropriate context.

## 2020-02-13 NOTE — ED TRIAGE NOTES
"Chief Complaint   Patient presents with   • Drug Abuse     meth use tonight, \"my body is going crazy\"   • Chest Pain     tightness, worse with inhalation       Pt presents to ed tonight after he reports being awake x48 hours drinking with friends before using meth last night in an attempt to sober up.  Pt denies hallucinations however he does appear to be responding to stimuli in triage.      Pt also reports chest pain/tightness that is worse with deep inhalation.     EKG ordered.   Pt placed back in lobby.  Informed of triage process and wait times, thanked for patience.  Pt instructed to notify staff of any symptom changes.    /93   Pulse (!) 117   Temp 37.5 °C (99.5 °F)   Resp 20   Ht 1.753 m (5' 9\")   Wt 79.1 kg (174 lb 6.1 oz)   SpO2 98%   BMI 25.75 kg/m²    "

## 2020-06-06 ENCOUNTER — HOSPITAL ENCOUNTER (EMERGENCY)
Dept: HOSPITAL 8 - ED | Age: 30
Discharge: HOME | End: 2020-06-06
Payer: MEDICAID

## 2020-06-06 VITALS — HEIGHT: 69 IN | WEIGHT: 185.19 LBS | BODY MASS INDEX: 27.43 KG/M2

## 2020-06-06 VITALS — SYSTOLIC BLOOD PRESSURE: 135 MMHG | DIASTOLIC BLOOD PRESSURE: 89 MMHG
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
## 2020-06-06 DIAGNOSIS — F10.20: ICD-10-CM

## 2020-06-06 DIAGNOSIS — R07.89: Primary | ICD-10-CM

## 2020-06-06 DIAGNOSIS — F15.20: ICD-10-CM

## 2020-06-06 LAB
ALBUMIN SERPL-MCNC: 3.9 G/DL (ref 3.4–5)
ANION GAP SERPL CALC-SCNC: 6 MMOL/L (ref 5–15)
BASOPHILS # BLD AUTO: 0.03 X10^3/UL (ref 0–0.1)
BASOPHILS NFR BLD AUTO: 0 % (ref 0–1)
CALCIUM SERPL-MCNC: 9.1 MG/DL (ref 8.5–10.1)
CHLORIDE SERPL-SCNC: 107 MMOL/L (ref 98–107)
CREAT SERPL-MCNC: 1.22 MG/DL (ref 0.7–1.3)
EOSINOPHIL # BLD AUTO: 0.06 X10^3/UL (ref 0–0.4)
EOSINOPHIL NFR BLD AUTO: 1 % (ref 1–7)
ERYTHROCYTE [DISTWIDTH] IN BLOOD BY AUTOMATED COUNT: 12.5 % (ref 9.4–14.8)
LYMPHOCYTES # BLD AUTO: 2.07 X10^3/UL (ref 1–3.4)
LYMPHOCYTES NFR BLD AUTO: 31 % (ref 22–44)
MCH RBC QN AUTO: 29.9 PG (ref 27.5–34.5)
MCHC RBC AUTO-ENTMCNC: 33.3 G/DL (ref 33.2–36.2)
MCV RBC AUTO: 90 FL (ref 81–97)
MD: NO
MONOCYTES # BLD AUTO: 0.59 X10^3/UL (ref 0.2–0.8)
MONOCYTES NFR BLD AUTO: 9 % (ref 2–9)
NEUTROPHILS # BLD AUTO: 4.01 X10^3/UL (ref 1.8–6.8)
NEUTROPHILS NFR BLD AUTO: 59 % (ref 42–75)
PLATELET # BLD AUTO: 241 X10^3/UL (ref 130–400)
PMV BLD AUTO: 7.8 FL (ref 7.4–10.4)
RBC # BLD AUTO: 4.97 X10^6/UL (ref 4.38–5.82)
TROPONIN I SERPL-MCNC: < 0.015 NG/ML (ref 0–0.04)

## 2020-06-06 PROCEDURE — 80048 BASIC METABOLIC PNL TOTAL CA: CPT

## 2020-06-06 PROCEDURE — 84484 ASSAY OF TROPONIN QUANT: CPT

## 2020-06-06 PROCEDURE — 71045 X-RAY EXAM CHEST 1 VIEW: CPT

## 2020-06-06 PROCEDURE — 93005 ELECTROCARDIOGRAM TRACING: CPT

## 2020-06-06 PROCEDURE — 99285 EMERGENCY DEPT VISIT HI MDM: CPT

## 2020-06-06 PROCEDURE — 85025 COMPLETE CBC W/AUTO DIFF WBC: CPT

## 2020-06-06 PROCEDURE — 36415 COLL VENOUS BLD VENIPUNCTURE: CPT

## 2020-06-06 PROCEDURE — 82040 ASSAY OF SERUM ALBUMIN: CPT

## 2020-06-06 NOTE — NUR
PT BROUGHT BACK FROM TRIAGE WITH CHIEF COMPLAINT OF SOB & CP IINTERMITTENT FOR 
1 WEEK, WORSENING AT 2 AM THIS MORNING. PT REPORTS METH AND ETOH 6/5.

## 2020-11-28 ENCOUNTER — HOSPITAL ENCOUNTER (EMERGENCY)
Dept: HOSPITAL 8 - ED | Age: 30
Discharge: LEFT BEFORE DISCHARGE | End: 2020-11-28
Payer: MEDICAID

## 2020-11-28 ENCOUNTER — APPOINTMENT (OUTPATIENT)
Dept: RADIOLOGY | Facility: MEDICAL CENTER | Age: 30
End: 2020-11-28
Attending: EMERGENCY MEDICINE
Payer: MEDICAID

## 2020-11-28 ENCOUNTER — HOSPITAL ENCOUNTER (EMERGENCY)
Facility: MEDICAL CENTER | Age: 30
End: 2020-11-28
Attending: EMERGENCY MEDICINE
Payer: MEDICAID

## 2020-11-28 VITALS
HEART RATE: 79 BPM | TEMPERATURE: 97.8 F | BODY MASS INDEX: 28.11 KG/M2 | RESPIRATION RATE: 17 BRPM | DIASTOLIC BLOOD PRESSURE: 99 MMHG | SYSTOLIC BLOOD PRESSURE: 153 MMHG | OXYGEN SATURATION: 96 % | HEIGHT: 69 IN | WEIGHT: 189.82 LBS

## 2020-11-28 VITALS — WEIGHT: 187.61 LBS | HEIGHT: 69 IN | BODY MASS INDEX: 27.79 KG/M2

## 2020-11-28 VITALS — DIASTOLIC BLOOD PRESSURE: 102 MMHG | SYSTOLIC BLOOD PRESSURE: 144 MMHG

## 2020-11-28 DIAGNOSIS — R06.02: ICD-10-CM

## 2020-11-28 DIAGNOSIS — R07.9 CHEST PAIN, UNSPECIFIED TYPE: ICD-10-CM

## 2020-11-28 DIAGNOSIS — R07.9: Primary | ICD-10-CM

## 2020-11-28 DIAGNOSIS — R74.8 ELEVATED LIPASE: ICD-10-CM

## 2020-11-28 DIAGNOSIS — F41.9 ANXIETY: ICD-10-CM

## 2020-11-28 DIAGNOSIS — R00.0: ICD-10-CM

## 2020-11-28 LAB
ALBUMIN SERPL BCP-MCNC: 4.4 G/DL (ref 3.2–4.9)
ALBUMIN/GLOB SERPL: 1.8 G/DL
ALP SERPL-CCNC: 115 U/L (ref 30–99)
ALT SERPL-CCNC: 27 U/L (ref 2–50)
ANION GAP SERPL CALC-SCNC: 8 MMOL/L (ref 7–16)
AST SERPL-CCNC: 29 U/L (ref 12–45)
BASOPHILS # BLD AUTO: 0.6 % (ref 0–1.8)
BASOPHILS # BLD: 0.03 K/UL (ref 0–0.12)
BILIRUB SERPL-MCNC: 0.3 MG/DL (ref 0.1–1.5)
BUN SERPL-MCNC: 11 MG/DL (ref 8–22)
CALCIUM SERPL-MCNC: 9.4 MG/DL (ref 8.5–10.5)
CHLORIDE SERPL-SCNC: 101 MMOL/L (ref 96–112)
CO2 SERPL-SCNC: 27 MMOL/L (ref 20–33)
COVID ORDER STATUS COVID19: NORMAL
CREAT SERPL-MCNC: 1.05 MG/DL (ref 0.5–1.4)
EKG IMPRESSION: NORMAL
EOSINOPHIL # BLD AUTO: 0.04 K/UL (ref 0–0.51)
EOSINOPHIL NFR BLD: 0.8 % (ref 0–6.9)
ERYTHROCYTE [DISTWIDTH] IN BLOOD BY AUTOMATED COUNT: 40.8 FL (ref 35.9–50)
GLOBULIN SER CALC-MCNC: 2.4 G/DL (ref 1.9–3.5)
GLUCOSE SERPL-MCNC: 103 MG/DL (ref 65–99)
HCT VFR BLD AUTO: 43.9 % (ref 42–52)
HGB BLD-MCNC: 14.8 G/DL (ref 14–18)
IMM GRANULOCYTES # BLD AUTO: 0.02 K/UL (ref 0–0.11)
IMM GRANULOCYTES NFR BLD AUTO: 0.4 % (ref 0–0.9)
LIPASE SERPL-CCNC: 147 U/L (ref 11–82)
LYMPHOCYTES # BLD AUTO: 1.69 K/UL (ref 1–4.8)
LYMPHOCYTES NFR BLD: 32.1 % (ref 22–41)
MCH RBC QN AUTO: 30.5 PG (ref 27–33)
MCHC RBC AUTO-ENTMCNC: 33.7 G/DL (ref 33.7–35.3)
MCV RBC AUTO: 90.5 FL (ref 81.4–97.8)
MONOCYTES # BLD AUTO: 0.5 K/UL (ref 0–0.85)
MONOCYTES NFR BLD AUTO: 9.5 % (ref 0–13.4)
NEUTROPHILS # BLD AUTO: 2.99 K/UL (ref 1.82–7.42)
NEUTROPHILS NFR BLD: 56.6 % (ref 44–72)
NRBC # BLD AUTO: 0 K/UL
NRBC BLD-RTO: 0 /100 WBC
NT-PROBNP SERPL IA-MCNC: 5 PG/ML (ref 0–125)
PLATELET # BLD AUTO: 248 K/UL (ref 164–446)
PMV BLD AUTO: 9.8 FL (ref 9–12.9)
POTASSIUM SERPL-SCNC: 3.9 MMOL/L (ref 3.6–5.5)
PROT SERPL-MCNC: 6.8 G/DL (ref 6–8.2)
RBC # BLD AUTO: 4.85 M/UL (ref 4.7–6.1)
SODIUM SERPL-SCNC: 136 MMOL/L (ref 135–145)
TROPONIN T SERPL-MCNC: 8 NG/L (ref 6–19)
WBC # BLD AUTO: 5.3 K/UL (ref 4.8–10.8)

## 2020-11-28 PROCEDURE — 99283 EMERGENCY DEPT VISIT LOW MDM: CPT

## 2020-11-28 PROCEDURE — 36415 COLL VENOUS BLD VENIPUNCTURE: CPT

## 2020-11-28 PROCEDURE — 80053 COMPREHEN METABOLIC PANEL: CPT

## 2020-11-28 PROCEDURE — A9270 NON-COVERED ITEM OR SERVICE: HCPCS | Performed by: EMERGENCY MEDICINE

## 2020-11-28 PROCEDURE — 99284 EMERGENCY DEPT VISIT MOD MDM: CPT

## 2020-11-28 PROCEDURE — 93005 ELECTROCARDIOGRAM TRACING: CPT

## 2020-11-28 PROCEDURE — 84484 ASSAY OF TROPONIN QUANT: CPT

## 2020-11-28 PROCEDURE — 700102 HCHG RX REV CODE 250 W/ 637 OVERRIDE(OP): Performed by: EMERGENCY MEDICINE

## 2020-11-28 PROCEDURE — 83690 ASSAY OF LIPASE: CPT

## 2020-11-28 PROCEDURE — 83880 ASSAY OF NATRIURETIC PEPTIDE: CPT

## 2020-11-28 PROCEDURE — U0003 INFECTIOUS AGENT DETECTION BY NUCLEIC ACID (DNA OR RNA); SEVERE ACUTE RESPIRATORY SYNDROME CORONAVIRUS 2 (SARS-COV-2) (CORONAVIRUS DISEASE [COVID-19]), AMPLIFIED PROBE TECHNIQUE, MAKING USE OF HIGH THROUGHPUT TECHNOLOGIES AS DESCRIBED BY CMS-2020-01-R: HCPCS

## 2020-11-28 PROCEDURE — 71045 X-RAY EXAM CHEST 1 VIEW: CPT

## 2020-11-28 PROCEDURE — 93005 ELECTROCARDIOGRAM TRACING: CPT | Performed by: EMERGENCY MEDICINE

## 2020-11-28 PROCEDURE — 96374 THER/PROPH/DIAG INJ IV PUSH: CPT

## 2020-11-28 PROCEDURE — 700111 HCHG RX REV CODE 636 W/ 250 OVERRIDE (IP): Performed by: EMERGENCY MEDICINE

## 2020-11-28 PROCEDURE — 85025 COMPLETE CBC W/AUTO DIFF WBC: CPT

## 2020-11-28 RX ORDER — LORAZEPAM 1 MG/1
1 TABLET ORAL ONCE
Status: COMPLETED | OUTPATIENT
Start: 2020-11-28 | End: 2020-11-28

## 2020-11-28 RX ORDER — MORPHINE SULFATE 4 MG/ML
4 INJECTION, SOLUTION INTRAMUSCULAR; INTRAVENOUS ONCE
Status: COMPLETED | OUTPATIENT
Start: 2020-11-28 | End: 2020-11-28

## 2020-11-28 RX ORDER — HYDROXYZINE 50 MG/1
50 TABLET, FILM COATED ORAL 3 TIMES DAILY PRN
Qty: 30 TAB | Refills: 0 | Status: SHIPPED | OUTPATIENT
Start: 2020-11-28 | End: 2021-11-25

## 2020-11-28 RX ADMIN — MORPHINE SULFATE 4 MG: 4 INJECTION INTRAVENOUS at 18:24

## 2020-11-28 RX ADMIN — LIDOCAINE HYDROCHLORIDE 30 ML: 20 SOLUTION OROPHARYNGEAL at 17:14

## 2020-11-28 RX ADMIN — LORAZEPAM 1 MG: 1 TABLET ORAL at 17:14

## 2020-11-28 ASSESSMENT — PAIN DESCRIPTION - PAIN TYPE: TYPE: ACUTE PAIN

## 2020-11-28 NOTE — ED TRIAGE NOTES
"Abdoul Medina 30 y.o. male ambulatory to triage with girlfriend for     Chief Complaint   Patient presents with   • Chest Pain   • N/V   • Diarrhea   • Anxiety   • Palpitations     EKG done prior to triage.  Pt reports drinking 1 pint or a little more whiskey per day.  Last drink 2200 last night.  Pt reports symptoms off and on for 2-3 months related to quitting drinking.  Pt asking for rehab and resources as well.  /98   Pulse 94   Temp 36.6 °C (97.8 °F) (Temporal)   Resp 16   Ht 1.753 m (5' 9\")   Wt 86.1 kg (189 lb 13.1 oz)   SpO2 97%   BMI 28.03 kg/m²   Pt returned to the lobby to await bed assignment.  Advised to return to the triage desk for any changes/concerns.  "

## 2020-11-29 LAB
SARS-COV-2 RNA RESP QL NAA+PROBE: NOTDETECTED
SPECIMEN SOURCE: NORMAL

## 2020-11-29 NOTE — ED PROVIDER NOTES
ED Provider Note    ED Provider Note    Primary care provider: Mary Ann Pretty M.D.  Means of arrival: Walk-in  History obtained from: Patient    CHIEF COMPLAINT  Chief Complaint   Patient presents with   • Detox   • Chest Pain   • Diarrhea   • Anxiety   • Palpitations   • N/V     Seen at 4:33 PM.   HPI  Abdoul Medina is a 30 y.o. male who presents to the Emergency Department for chest pain.  The patient thinks he has Covid.  He has had 2 weeks of stabbing pain as well as pressure.  The pain is been constant but worsening over the past 48 hours.  He also admits to heavy alcohol ingestion over the past few weeks, escalating over the past 48 hours.  He also uses occasional methamphetamines.  He complains of anxiety.  He notes that his heart is racing.  He feels short of breath.  He has had diarrhea as well as a cough.  He has had subjective fevers and chills.      He denies any suicidal or homicidal ideation.  He denies dysuria no history of diabetes or bleeding diathesis.    REVIEW OF SYSTEMS  See HPI,   Remainder of ROS negative.     PAST MEDICAL HISTORY   has a past medical history of Acne (4/15/2013), Alcohol ingestion, more than 4 drinks/day (3/4/2016), and Warts (1/22/2013).    SURGICAL HISTORY  patient denies any surgical history    SOCIAL HISTORY  Social History     Tobacco Use   • Smoking status: Never Smoker   • Smokeless tobacco: Never Used   Substance Use Topics   • Alcohol use: Yes     Comment: drinking every day for last year   • Drug use: No      Social History     Substance and Sexual Activity   Drug Use No       FAMILY HISTORY  Family History   Problem Relation Age of Onset   • Cancer Neg Hx    • Diabetes Neg Hx    • Heart Disease Neg Hx        CURRENT MEDICATIONS  Reviewed.  See Encounter Summary.     ALLERGIES  Allergies   Allergen Reactions   • Nkda [No Known Drug Allergy]        PHYSICAL EXAM  VITAL SIGNS: /99   Pulse 79   Temp 36.6 °C (97.8 °F) (Temporal)   Resp 17   Ht 1.753 m (5'  "9\")   Wt 86.1 kg (189 lb 13.1 oz)   SpO2 96%   BMI 28.03 kg/m²   Constitutional: Awake, alert in no apparent distress.  Appears anxious.  Patient having a difficult time sitting still.  HENT: Normocephalic, Bilateral external ears normal. Nose normal.   Eyes: Conjunctiva normal, non-icteric, EOMI.    Thorax & Lungs: Easy unlabored respirations, Clear to ascultation bilaterally.  Cardiovascular: Regular rate, Regular rhythm, No murmurs, rubs or gallops. Bilateral pulses symmetrical.   Abdomen:  Soft, nontender, nondistended, normal active bowel sounds.   :    Skin: Visualized skin is  Dry, No erythema, No rash.   Musculoskeletal:   No cyanosis, clubbing or edema. No leg asymmetry.   Neurologic: Alert, Grossly non-focal.   Psychiatric: Normal affect, Normal mood  Lymphatic:  No cervical LAD    EKG   12 lead Interpreted by me  Rhythm:  Normal sinus rhythm   Rate: 88  Axis: normal  Ectopy: none  Conduction: normal  ST Segments: no acute change  T Waves: no acute change  Clinical Impression: Normal EKG without acute changes     RADIOLOGY  DX-CHEST-PORTABLE (1 VIEW)   Final Result         No acute cardiac or pulmonary abnormality is identified.            COURSE & MEDICAL DECISION MAKING  Pertinent Labs & Imaging studies reviewed. (See chart for details)    Differential diagnoses include but are not limited to: Methamphetamine intoxication, meth washout, alcohol abuse, gastritis, dyspepsia, much less likely COVID-19, ACS, pancreatitis.  The patient is PERC negative for pulmonary embolus.    4:33 PM - Medical record reviewed, patient seen and examined at bedside.    6:19 PM-patient sitting up eating Ramen noodles, despite this he states he is still having some severe pain.  He is also asking for something for anxiety to be discharged with.  He notes he has not been able to sleep.    Decision Making:  This is a 30 y.o. year old male who presents with chest pain, difficulty sleeping, anxiety, recent alcohol brice, " methamphetamine abuse etc.  I believe that most the patient's manifestations today are due to his recent methamphetamine use.  He was given a dose of Ativan and was resting comfortably prior to discharge.  The patient was tolerating p.o. without difficulty.  Lipase is slightly elevated however he has no leukocytosis or leftward shift and was obviously tolerating p.o.  Therefore if this is pancreatitis it is quite mild and he does not require hospitalization.    At this time I believe that the chest pain is most likely due to his underlying anxiety or methamphetamine abuse.  High-sensitivity troponin is negative, heart score is very low, only risk factors are recreational drug use and tobacco use.  Do not suspect dissection, patient is also PERC negative for pulmonary embolus.  At this time he can be safely discharged.  He is directed to avoid stimulants as well as nicotine.  He can return for any chest pain or shortness of breath.  I did not place an outpatient cardiology follow-up as I do not feel that he would be benefited by further risk sratification due to his low pretest probability.    Discharge Medications:  Discharge Medication List as of 11/28/2020  7:05 PM      START taking these medications    Details   hydrOXYzine HCl (ATARAX) 50 MG Tab Take 1 Tab by mouth 3 times a day as needed for Anxiety., Disp-30 Tab, R-0, Normal             The patient was discharged home (see d/c instructions) was told to return immediately for any signs or symptoms listed, or any worsening at all.  The patient verbally agreed to the discharge precautions and follow-up plan which is documented in EPIC.    The patient's blood pressure is elevated today. >120/80. I have referred them to primary care for follow up.       FINAL IMPRESSION  1. Chest pain, unspecified type    2. Elevated lipase    3. Anxiety

## 2021-01-20 ENCOUNTER — HOSPITAL ENCOUNTER (EMERGENCY)
Dept: HOSPITAL 8 - ED | Age: 31
Discharge: HOME | End: 2021-01-20
Payer: MEDICAID

## 2021-01-20 VITALS — BODY MASS INDEX: 27.92 KG/M2 | HEIGHT: 69 IN | WEIGHT: 188.5 LBS

## 2021-01-20 VITALS — SYSTOLIC BLOOD PRESSURE: 149 MMHG | DIASTOLIC BLOOD PRESSURE: 100 MMHG
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
## 2021-01-20 DIAGNOSIS — S61.221A: Primary | ICD-10-CM
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
## 2021-01-20 DIAGNOSIS — F15.10: ICD-10-CM
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
## 2021-01-20 DIAGNOSIS — S61.213A: ICD-10-CM
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
## 2021-01-20 PROCEDURE — 12001 RPR S/N/AX/GEN/TRNK 2.5CM/<: CPT

## 2021-01-20 PROCEDURE — 90471 IMMUNIZATION ADMIN: CPT

## 2021-01-20 PROCEDURE — 99283 EMERGENCY DEPT VISIT LOW MDM: CPT

## 2021-01-20 PROCEDURE — 90715 TDAP VACCINE 7 YRS/> IM: CPT

## 2021-01-20 PROCEDURE — 12002 RPR S/N/AX/GEN/TRNK2.6-7.5CM: CPT

## 2021-04-12 ENCOUNTER — HOSPITAL ENCOUNTER (EMERGENCY)
Facility: MEDICAL CENTER | Age: 31
End: 2021-04-12
Attending: EMERGENCY MEDICINE
Payer: MEDICAID

## 2021-04-12 ENCOUNTER — APPOINTMENT (OUTPATIENT)
Dept: RADIOLOGY | Facility: MEDICAL CENTER | Age: 31
End: 2021-04-12
Attending: EMERGENCY MEDICINE
Payer: MEDICAID

## 2021-04-12 ENCOUNTER — HOSPITAL ENCOUNTER (EMERGENCY)
Dept: HOSPITAL 8 - ED | Age: 31
Discharge: HOME | End: 2021-04-12
Payer: MEDICAID

## 2021-04-12 VITALS
WEIGHT: 190.7 LBS | RESPIRATION RATE: 12 BRPM | DIASTOLIC BLOOD PRESSURE: 87 MMHG | SYSTOLIC BLOOD PRESSURE: 148 MMHG | TEMPERATURE: 97.2 F | HEART RATE: 109 BPM | BODY MASS INDEX: 28.24 KG/M2 | OXYGEN SATURATION: 98 % | HEIGHT: 69 IN

## 2021-04-12 VITALS — BODY MASS INDEX: 28.6 KG/M2 | HEIGHT: 69 IN | WEIGHT: 193.12 LBS

## 2021-04-12 VITALS — DIASTOLIC BLOOD PRESSURE: 96 MMHG | SYSTOLIC BLOOD PRESSURE: 146 MMHG
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
## 2021-04-12 DIAGNOSIS — F15.129: ICD-10-CM
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
## 2021-04-12 DIAGNOSIS — S06.0X0A: Primary | ICD-10-CM

## 2021-04-12 DIAGNOSIS — F15.10 METHAMPHETAMINE ABUSE (HCC): ICD-10-CM

## 2021-04-12 DIAGNOSIS — S01.511A: ICD-10-CM
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
## 2021-04-12 DIAGNOSIS — Z72.9: ICD-10-CM

## 2021-04-12 LAB — EKG IMPRESSION: NORMAL

## 2021-04-12 PROCEDURE — 71045 X-RAY EXAM CHEST 1 VIEW: CPT

## 2021-04-12 PROCEDURE — 70450 CT HEAD/BRAIN W/O DYE: CPT

## 2021-04-12 PROCEDURE — 96374 THER/PROPH/DIAG INJ IV PUSH: CPT

## 2021-04-12 PROCEDURE — 96372 THER/PROPH/DIAG INJ SC/IM: CPT

## 2021-04-12 PROCEDURE — 93005 ELECTROCARDIOGRAM TRACING: CPT

## 2021-04-12 PROCEDURE — 12051 INTMD RPR FACE/MM 2.5 CM/<: CPT

## 2021-04-12 PROCEDURE — 700111 HCHG RX REV CODE 636 W/ 250 OVERRIDE (IP): Performed by: EMERGENCY MEDICINE

## 2021-04-12 PROCEDURE — 99284 EMERGENCY DEPT VISIT MOD MDM: CPT

## 2021-04-12 PROCEDURE — 93005 ELECTROCARDIOGRAM TRACING: CPT | Performed by: EMERGENCY MEDICINE

## 2021-04-12 RX ORDER — LORAZEPAM 2 MG/ML
1 INJECTION INTRAMUSCULAR ONCE
Status: COMPLETED | OUTPATIENT
Start: 2021-04-12 | End: 2021-04-12

## 2021-04-12 RX ADMIN — LORAZEPAM 1 MG: 2 INJECTION INTRAMUSCULAR; INTRAVENOUS at 07:34

## 2021-04-12 ASSESSMENT — FIBROSIS 4 INDEX: FIB4 SCORE: 0.72

## 2021-04-12 ASSESSMENT — PAIN DESCRIPTION - DESCRIPTORS: DESCRIPTORS: SHARP

## 2021-04-12 ASSESSMENT — PAIN DESCRIPTION - PAIN TYPE: TYPE: ACUTE PAIN

## 2021-04-12 NOTE — ED PROVIDER NOTES
"ED Provider Note    CHIEF COMPLAINT  Chief Complaint   Patient presents with   • Chest Pain   • Headache   • Shortness of Breath       HPI  Abdoul Medina is a 31 y.o. male who presents to the emergency department not feeling well after smoking methamphetamine.  States he is anxious and nervous feeling tightness in his chest and short of breath.  Chest pain mild severity.  Nonradiating.  No tearing pain.  No back pain.  No pleuritic symptoms.  No leg swelling.  He has a headache.  Denies weakness numbness neurologic symptoms.  Denies abdominal pain nausea vomiting diarrhea.  No fevers or recent illness.    REVIEW OF SYSTEMS  As per HPI, otherwise a 10 point review of systems is negative    PAST MEDICAL HISTORY  Past Medical History:   Diagnosis Date   • Acne 4/15/2013   • Alcohol ingestion, more than 4 drinks/day 3/4/2016   • Warts 1/22/2013       SOCIAL HISTORY  Social History     Tobacco Use   • Smoking status: Never Smoker   • Smokeless tobacco: Never Used   Substance Use Topics   • Alcohol use: Yes     Comment: drinking every day for last year   • Drug use: Yes     Types: Inhaled     Comment: meth       SURGICAL HISTORY  History reviewed. No pertinent surgical history.    CURRENT MEDICATIONS  Home Medications    **Home medications have not yet been reviewed for this encounter**         ALLERGIES  Allergies   Allergen Reactions   • Nkda [No Known Drug Allergy]        PHYSICAL EXAM  VITAL SIGNS: /87   Pulse (!) 109   Temp 36.2 °C (97.2 °F) (Temporal)   Resp 12   Ht 1.753 m (5' 9\")   Wt 86.5 kg (190 lb 11.2 oz)   SpO2 98%   BMI 28.16 kg/m²    Constitutional: Awake and alert  HENT: Normal inspection  Eyes: Normal inspection  Neck: Grossly normal range of motion.  Cardiovascular: Elevated heart rate, Normal rhythm.  Symmetric radial peripheral pulses.   Thorax & Lungs: No respiratory distress, No wheezing, No rales, No rhonchi, No chest tenderness.   Abdomen: Bowel sounds normal, soft, non-distended, " nontender, no mass  Skin: No obvious rash.  Back: No tenderness, No CVA tenderness.   Extremities: No clubbing, cyanosis, edema, no Homans or cords.  Neurologic: Grossly normal   Psychiatric: Normal for situation    RADIOLOGY/PROCEDURES  DX-CHEST-PORTABLE (1 VIEW)   Final Result      No acute cardiopulmonary findings.           Imaging is interpreted by radiologist    Labs:  Results for orders placed or performed during the hospital encounter of 21   EKG (NOW)   Result Value Ref Range    Report       [facility] Emergency Dept.    Test Date:  2021  Pt Name:    PRECIOUS DE LUNA                  Department: ER  MRN:        [id number]                      Room:  Gender:     Male                         Technician: [id number]  :        1990                   Requested By:ER TRIAGE PROTOCOL  Order #:    [id number]                    Reading MD: JAILENE TURNER MD    Measurements  Intervals                                Axis  Rate:       119                          P:          59  WY:         140                          QRS:        51  QRSD:       84                           T:          27  QT:         332  QTc:        468    Interpretive Statements  SINUS TACHYCARDIA  Compared to ECG 2020 13:27:31  Sinus rhythm no longer present  Electronically Signed On 2021 8:41:26 PDT by JAILENE TURNER MD         Medications   LORazepam (ATIVAN) injection 1 mg (1 mg Intravenous Given 21 0734)         COURSE & MEDICAL DECISION MAKING  Patient presents with chest pain after using methamphetamine.  EKG does not show acute ischemia.  Obtain chest x-ray is negative.  Unlikely pulmonary embolism or dissection.  Patient was given Ativan and observed in ER.    All of patient's symptoms resolved with Ativan.  Slightly tachycardic related to stimulant abuse.  Discussed dangers of methamphetamine use.  He is not interested in any resources for detox.  He says he is going to stop using.  I  precaution patient to return to the ER for any recurrent chest pain or other concerning symptoms.      FINAL IMPRESSION  1.  Methamphetamine abuse      This dictation was created using voice recognition software. The accuracy of the dictation is limited to the abilities of the software.  The nursing notes were reviewed and certain aspects of this information were incorporated into this note.      Electronically signed by: Alton Infante M.D., 4/12/2021 8:40 AM

## 2021-04-12 NOTE — ED NOTES
Pt provided with discharge instructions. Pt had no further questions. Pt ambulated to [facility]

## 2021-04-12 NOTE — ED NOTES
Pt states last night around midnight he took a couple of hits of meth and he started to feel pressure in his heart at 10/10 pain, pt states he feels like he cant catch his breath and is short of breath, pt states the pain does not radiate it stays constant in the middle of his chest

## 2021-04-12 NOTE — ED TRIAGE NOTES
Abdoul Medina  31 y.o. male  Chief Complaint   Patient presents with   • Chest Pain   • Headache   • Shortness of Breath     Patient reports above symptoms started last night around midnight after he took 10 shots of whiskey and inhaled meth.

## 2021-04-19 ENCOUNTER — HOSPITAL ENCOUNTER (EMERGENCY)
Facility: MEDICAL CENTER | Age: 31
End: 2021-04-20
Attending: EMERGENCY MEDICINE
Payer: MEDICAID

## 2021-04-19 DIAGNOSIS — E86.0 DEHYDRATION: ICD-10-CM

## 2021-04-19 DIAGNOSIS — F10.10 ALCOHOL ABUSE: ICD-10-CM

## 2021-04-19 DIAGNOSIS — F15.10 METHAMPHETAMINE ABUSE (HCC): ICD-10-CM

## 2021-04-19 DIAGNOSIS — S09.90XA CLOSED HEAD INJURY, INITIAL ENCOUNTER: ICD-10-CM

## 2021-04-19 DIAGNOSIS — M62.82 NON-TRAUMATIC RHABDOMYOLYSIS: ICD-10-CM

## 2021-04-19 PROCEDURE — 99285 EMERGENCY DEPT VISIT HI MDM: CPT

## 2021-04-19 RX ORDER — HYDROXYZINE PAMOATE 25 MG/1
CAPSULE ORAL 2 TIMES DAILY
Status: SHIPPED | COMMUNITY
End: 2021-05-15

## 2021-04-19 RX ORDER — CLONAZEPAM 0.5 MG/1
TABLET ORAL DAILY
Status: SHIPPED | COMMUNITY
End: 2021-05-15

## 2021-04-19 RX ORDER — TRAZODONE HYDROCHLORIDE 100 MG/1
200 TABLET ORAL NIGHTLY
Status: SHIPPED | COMMUNITY
End: 2021-05-15

## 2021-04-19 ASSESSMENT — FIBROSIS 4 INDEX: FIB4 SCORE: 0.72

## 2021-04-20 ENCOUNTER — APPOINTMENT (OUTPATIENT)
Dept: RADIOLOGY | Facility: MEDICAL CENTER | Age: 31
End: 2021-04-20
Attending: EMERGENCY MEDICINE
Payer: MEDICAID

## 2021-04-20 VITALS
WEIGHT: 189.6 LBS | HEART RATE: 93 BPM | RESPIRATION RATE: 13 BRPM | HEIGHT: 69 IN | OXYGEN SATURATION: 99 % | BODY MASS INDEX: 28.08 KG/M2 | SYSTOLIC BLOOD PRESSURE: 135 MMHG | DIASTOLIC BLOOD PRESSURE: 82 MMHG | TEMPERATURE: 97.2 F

## 2021-04-20 LAB
ALBUMIN SERPL BCP-MCNC: 4.4 G/DL (ref 3.2–4.9)
ALBUMIN/GLOB SERPL: 1.7 G/DL
ALP SERPL-CCNC: 113 U/L (ref 30–99)
ALT SERPL-CCNC: 24 U/L (ref 2–50)
ANION GAP SERPL CALC-SCNC: 14 MMOL/L (ref 7–16)
AST SERPL-CCNC: 44 U/L (ref 12–45)
BASOPHILS # BLD AUTO: 0.3 % (ref 0–1.8)
BASOPHILS # BLD: 0.03 K/UL (ref 0–0.12)
BILIRUB SERPL-MCNC: 0.2 MG/DL (ref 0.1–1.5)
BUN SERPL-MCNC: 10 MG/DL (ref 8–22)
CALCIUM SERPL-MCNC: 9.1 MG/DL (ref 8.4–10.2)
CHLORIDE SERPL-SCNC: 102 MMOL/L (ref 96–112)
CK SERPL-CCNC: 1155 U/L (ref 0–154)
CK SERPL-CCNC: 928 U/L (ref 0–154)
CO2 SERPL-SCNC: 21 MMOL/L (ref 20–33)
CREAT SERPL-MCNC: 1.08 MG/DL (ref 0.5–1.4)
EOSINOPHIL # BLD AUTO: 0.07 K/UL (ref 0–0.51)
EOSINOPHIL NFR BLD: 0.8 % (ref 0–6.9)
ERYTHROCYTE [DISTWIDTH] IN BLOOD BY AUTOMATED COUNT: 39.3 FL (ref 35.9–50)
FLUAV RNA SPEC QL NAA+PROBE: NEGATIVE
FLUBV RNA SPEC QL NAA+PROBE: NEGATIVE
GLOBULIN SER CALC-MCNC: 2.6 G/DL (ref 1.9–3.5)
GLUCOSE SERPL-MCNC: 114 MG/DL (ref 65–99)
HCT VFR BLD AUTO: 41.5 % (ref 42–52)
HGB BLD-MCNC: 14.6 G/DL (ref 14–18)
IMM GRANULOCYTES # BLD AUTO: 0.03 K/UL (ref 0–0.11)
IMM GRANULOCYTES NFR BLD AUTO: 0.3 % (ref 0–0.9)
LIPASE SERPL-CCNC: 36 U/L (ref 7–58)
LYMPHOCYTES # BLD AUTO: 2.24 K/UL (ref 1–4.8)
LYMPHOCYTES NFR BLD: 25.6 % (ref 22–41)
MAGNESIUM SERPL-MCNC: 2 MG/DL (ref 1.5–2.5)
MCH RBC QN AUTO: 30.9 PG (ref 27–33)
MCHC RBC AUTO-ENTMCNC: 35.2 G/DL (ref 33.7–35.3)
MCV RBC AUTO: 87.9 FL (ref 81.4–97.8)
MONOCYTES # BLD AUTO: 0.78 K/UL (ref 0–0.85)
MONOCYTES NFR BLD AUTO: 8.9 % (ref 0–13.4)
NEUTROPHILS # BLD AUTO: 5.61 K/UL (ref 1.82–7.42)
NEUTROPHILS NFR BLD: 64.1 % (ref 44–72)
NRBC # BLD AUTO: 0 K/UL
NRBC BLD-RTO: 0 /100 WBC
PLATELET # BLD AUTO: 253 K/UL (ref 164–446)
PMV BLD AUTO: 9.5 FL (ref 9–12.9)
POTASSIUM SERPL-SCNC: 3.7 MMOL/L (ref 3.6–5.5)
PROT SERPL-MCNC: 7 G/DL (ref 6–8.2)
RBC # BLD AUTO: 4.72 M/UL (ref 4.7–6.1)
RSV RNA SPEC QL NAA+PROBE: NEGATIVE
SARS-COV-2 RNA RESP QL NAA+PROBE: NOTDETECTED
SODIUM SERPL-SCNC: 137 MMOL/L (ref 135–145)
SPECIMEN SOURCE: NORMAL
WBC # BLD AUTO: 8.8 K/UL (ref 4.8–10.8)

## 2021-04-20 PROCEDURE — 83735 ASSAY OF MAGNESIUM: CPT

## 2021-04-20 PROCEDURE — 70450 CT HEAD/BRAIN W/O DYE: CPT

## 2021-04-20 PROCEDURE — A9270 NON-COVERED ITEM OR SERVICE: HCPCS | Performed by: EMERGENCY MEDICINE

## 2021-04-20 PROCEDURE — 0241U HCHG SARS-COV-2 COVID-19 NFCT DS RESP RNA 4 TRGT MIC: CPT

## 2021-04-20 PROCEDURE — 700105 HCHG RX REV CODE 258: Performed by: EMERGENCY MEDICINE

## 2021-04-20 PROCEDURE — 96375 TX/PRO/DX INJ NEW DRUG ADDON: CPT

## 2021-04-20 PROCEDURE — 80053 COMPREHEN METABOLIC PANEL: CPT

## 2021-04-20 PROCEDURE — C9803 HOPD COVID-19 SPEC COLLECT: HCPCS | Performed by: EMERGENCY MEDICINE

## 2021-04-20 PROCEDURE — 700111 HCHG RX REV CODE 636 W/ 250 OVERRIDE (IP): Performed by: EMERGENCY MEDICINE

## 2021-04-20 PROCEDURE — 85025 COMPLETE CBC W/AUTO DIFF WBC: CPT

## 2021-04-20 PROCEDURE — 96374 THER/PROPH/DIAG INJ IV PUSH: CPT

## 2021-04-20 PROCEDURE — 82550 ASSAY OF CK (CPK): CPT | Mod: 91

## 2021-04-20 PROCEDURE — 36415 COLL VENOUS BLD VENIPUNCTURE: CPT

## 2021-04-20 PROCEDURE — 700102 HCHG RX REV CODE 250 W/ 637 OVERRIDE(OP): Performed by: EMERGENCY MEDICINE

## 2021-04-20 PROCEDURE — 83690 ASSAY OF LIPASE: CPT

## 2021-04-20 RX ORDER — SODIUM CHLORIDE, SODIUM LACTATE, POTASSIUM CHLORIDE, CALCIUM CHLORIDE 600; 310; 30; 20 MG/100ML; MG/100ML; MG/100ML; MG/100ML
1000 INJECTION, SOLUTION INTRAVENOUS ONCE
Status: COMPLETED | OUTPATIENT
Start: 2021-04-20 | End: 2021-04-20

## 2021-04-20 RX ORDER — LORAZEPAM 2 MG/ML
1 INJECTION INTRAMUSCULAR ONCE
Status: COMPLETED | OUTPATIENT
Start: 2021-04-20 | End: 2021-04-20

## 2021-04-20 RX ORDER — CHLORDIAZEPOXIDE HYDROCHLORIDE 25 MG/1
25 CAPSULE, GELATIN COATED ORAL ONCE
Status: COMPLETED | OUTPATIENT
Start: 2021-04-20 | End: 2021-04-20

## 2021-04-20 RX ADMIN — SODIUM CHLORIDE, POTASSIUM CHLORIDE, SODIUM LACTATE AND CALCIUM CHLORIDE 1000 ML: 600; 310; 30; 20 INJECTION, SOLUTION INTRAVENOUS at 01:42

## 2021-04-20 RX ADMIN — CHLORDIAZEPOXIDE HYDROCHLORIDE 25 MG: 25 CAPSULE ORAL at 02:37

## 2021-04-20 RX ADMIN — SODIUM CHLORIDE, POTASSIUM CHLORIDE, SODIUM LACTATE AND CALCIUM CHLORIDE 1000 ML: 600; 310; 30; 20 INJECTION, SOLUTION INTRAVENOUS at 00:28

## 2021-04-20 RX ADMIN — FAMOTIDINE 20 MG: 10 INJECTION INTRAVENOUS at 00:25

## 2021-04-20 RX ADMIN — LORAZEPAM 1 MG: 2 INJECTION INTRAMUSCULAR; INTRAVENOUS at 00:25

## 2021-04-20 NOTE — DISCHARGE INSTRUCTIONS
You were seen and evaluated in the Emergency Department at [facility] for:     Not feeling well after binging on alcohol    You had the following tests and studies:    Thankfully your work-up today is reassuring we do not see any endorgan damage    You received the following medications:    IV fluids, lorazepam, famotidine    ----------------------------    Please make sure to follow up with:    If your rehab clinic or a mental health facility for help with your substance abuse, go to [facility] for routine health care, if you have any worsening symptoms come back to the hospital right away.  At this time you are medically cleared to try to find rehab for your alcohol and drug abuse.  ----------------------------    We always encourage patients to return IMMEDIATELY if they have:  Increased or changing pain, passing out, fevers over 100.4 (taken in your mouth or rectally) for more than 2 days, redness or swelling of skin or tissues, feeling like your heart is beating fast, chest pain that is new or worsening, trouble breathing, feeling like your throat is closing up and can not breath, inability to walk, weakness of any part of your body, new dizziness, severe bleeding that won't stop from any part of your body, if you can't eat or drink, or if you have any other concerns.   If you feel worse, please know that you can always return with any questions, concerns, worse symptoms, or you are feeling unsafe. We certainly cannot say for sure that we have ruled out every illness or dangerous disease, but we feel that at this specific time, your exam, tests, and vital signs like heart rate and blood pressure are safe for discharge.

## 2021-04-20 NOTE — ED TRIAGE NOTES
"Chief Complaint   Patient presents with   • Head Injury     \"i got hit in the head.\" 4-5 days ago. c/o headache.   • Dehydration     \"I'm having an allergic reaction to dehydration.\"   • Pain     pain in hands, states, \"I think i'm having an allergic reaction.\"   • Detox     \"I need to be sent to rehab for alcohol.\"     ED Triage Vitals [04/19/21 2314]   Enc Vitals Group      Blood Pressure 139/97      Pulse (!) 108      Respiration 20      Temperature 36.2 °C (97.2 °F)      Temp src Temporal      Pulse Oximetry 94 %      Weight 86 kg (189 lb 9.5 oz)      Height 1.753 m (5' 9\")       "

## 2021-04-20 NOTE — ED PROVIDER NOTES
"ED Provider Note    CHIEF COMPLAINT  Chief Complaint   Patient presents with   • Head Injury     \"i got hit in the head.\" 4-5 days ago. c/o headache.   • Dehydration     \"I'm having an allergic reaction to dehydration.\"   • Pain     pain in hands, states, \"I think i'm having an allergic reaction.\"   • Detox     \"I need to be sent to rehab for alcohol.\"       HPI    Primary care provider: None  Means of arrival: Walk-in  History obtained from: Patient  History limited by: Nothing    Abdoul Medina is a 31 y.o. male who presents with concerns for headache after head injury 4 to 5 days ago.  Dull frontal achy.  Constant and worsening.  Also complaining of bilateral hand and foot pain, as well as being concerned for being dehydrated.  The patient has been abusing alcohol drinking up to a handle a day lately.  He has a known history of alcoholism he was actually starting a rehab program for 3 months but left after the first month and then came back to [place].  He has been drinking every day and is requesting for help with his alcohol abuse.  He has been walking around outside in the heat and has achy bilateral pain to the plantar surface of both of his feet.  No alleviating measures noted.  No aggravating factors noted.  He is not suicidal he wants help with his drug and alcohol abuse.    REVIEW OF SYSTEMS  Constitutional: Negative for fever or chills.  Positive for malaise and aches.  HENT: Negative for rhinorrhea or sore throat.    Eyes: Negative for vision changes or discharge.  Double or blurry or loss of vision.    Respiratory: Negative for cough or shortness of breath.    Cardiovascular: Negative for chest pain or palpitations.   Gastrointestinal: Positive for nausea negative for abdominal pain.  Genitourinary: Negative for dysuria or flank pain.   Musculoskeletal: Positive for backaches and bilateral hand and foot pain.  Skin: Negative for itching or rash.   Neurological: Negative for sensory or motor changes. " "  Psychiatric/Behavioral: Negative for depression or suicidal ideas.  Positive for substance abuse.  See HPI for further details. All other systems are negative.     PAST MEDICAL HISTORY   has a past medical history of Acne (4/15/2013), Alcohol ingestion, more than 4 drinks/day (3/4/2016), and Warts (1/22/2013).    PAST FAMILY HISTORY  Family History   Problem Relation Age of Onset   • Cancer Neg Hx    • Diabetes Neg Hx    • Heart Disease Neg Hx        SOCIAL HISTORY  Social History     Tobacco Use   • Smoking status: Never Smoker   • Smokeless tobacco: Never Used   Substance and Sexual Activity   • Alcohol use: Yes     Comment: drinking every day for last year   • Drug use: Yes     Types: Inhaled     Comment: marijuana   • Sexual activity: Not on file       SURGICAL HISTORY   has a past surgical history that includes clavicle orif (Left).    CURRENT MEDICATIONS  Home Medications     Reviewed by Gaudencio Faria R.N. (Registered Nurse) on 04/19/21 at 2321  Med List Status: Partial   Medication Last Dose Status   ciprofloxacin (CIPRO) 500 MG Tab Not Taking Active   clonazePAM (KLONOPIN) 0.5 MG Tab 4/12/2021 Active   finasteride (PROSCAR) 5 MG TABS Not Taking Active   hydrOXYzine HCl (ATARAX) 50 MG Tab Not Taking Active   hydrOXYzine pamoate (VISTARIL) 25 MG Cap 4/12/2021 Active   MINOXIDIL, TOPICAL, 5 % SOLN Not Taking Active   ondansetron (ZOFRAN ODT) 4 MG TABLET DISPERSIBLE Not Taking Active   traZODone (DESYREL) 100 MG Tab 4/12/2021 Active                ALLERGIES  No Known Allergies    PHYSICAL EXAM  VITAL SIGNS: /82   Pulse 93   Temp 36.2 °C (97.2 °F) (Temporal)   Resp 13   Ht 1.753 m (5' 9\")   Wt 86 kg (189 lb 9.5 oz)   SpO2 99%   BMI 28.00 kg/m²    Pulse ox interpretation: On room air I interpret this pulse ox as normal.  Constitutional: Unkempt, lying on the stretcher in mild distress.  HEENT: Normocephalic, atraumatic. Posterior pharynx clear, mucous membranes very dry.  Eyes:  EOMI. PERRLA " 3-2, injected sclerae.  Neck: Supple, nontender.  Chest/Pulmonary: Clear to ausculation bilaterally, no wheezes or rhonchi.  Cardiovascular: Regular rate and rhythm, no murmur.   Abdomen: Soft, nontender; no rebound, guarding, or masses.  Back: No CVA or midline tenderness.   Musculoskeletal: No deformity or edema.  Neuro: Clear speech, normal coordination, cranial nerves II-XII grossly intact, no focal asymmetry or sensory deficits.   Psych: Flat affect but cooperative, not suicidal.  Skin: Mild skin breakdown to the plantar surface of both feet, skin otherwise warm and dry.      DIAGNOSTIC STUDIES / PROCEDURES    LABS & EKG  Results for orders placed or performed during the hospital encounter of 04/19/21   CBC WITH DIFFERENTIAL   Result Value Ref Range    WBC 8.8 4.8 - 10.8 K/uL    RBC 4.72 4.70 - 6.10 M/uL    Hemoglobin 14.6 14.0 - 18.0 g/dL    Hematocrit 41.5 (L) 42.0 - 52.0 %    MCV 87.9 81.4 - 97.8 fL    MCH 30.9 27.0 - 33.0 pg    MCHC 35.2 33.7 - 35.3 g/dL    RDW 39.3 35.9 - 50.0 fL    Platelet Count 253 164 - 446 K/uL    MPV 9.5 9.0 - 12.9 fL    Neutrophils-Polys 64.10 44.00 - 72.00 %    Lymphocytes 25.60 22.00 - 41.00 %    Monocytes 8.90 0.00 - 13.40 %    Eosinophils 0.80 0.00 - 6.90 %    Basophils 0.30 0.00 - 1.80 %    Immature Granulocytes 0.30 0.00 - 0.90 %    Nucleated RBC 0.00 /100 WBC    Neutrophils (Absolute) 5.61 1.82 - 7.42 K/uL    Lymphs (Absolute) 2.24 1.00 - 4.80 K/uL    Monos (Absolute) 0.78 0.00 - 0.85 K/uL    Eos (Absolute) 0.07 0.00 - 0.51 K/uL    Baso (Absolute) 0.03 0.00 - 0.12 K/uL    Immature Granulocytes (abs) 0.03 0.00 - 0.11 K/uL    NRBC (Absolute) 0.00 K/uL   CMP   Result Value Ref Range    Sodium 137 135 - 145 mmol/L    Potassium 3.7 3.6 - 5.5 mmol/L    Chloride 102 96 - 112 mmol/L    Co2 21 20 - 33 mmol/L    Anion Gap 14.0 7.0 - 16.0    Glucose 114 (H) 65 - 99 mg/dL    Bun 10 8 - 22 mg/dL    Creatinine 1.08 0.50 - 1.40 mg/dL    Calcium 9.1 8.4 - 10.2 mg/dL    AST(SGOT) 44 12 - 45  U/L    ALT(SGPT) 24 2 - 50 U/L    Alkaline Phosphatase 113 (H) 30 - 99 U/L    Total Bilirubin 0.2 0.1 - 1.5 mg/dL    Albumin 4.4 3.2 - 4.9 g/dL    Total Protein 7.0 6.0 - 8.2 g/dL    Globulin 2.6 1.9 - 3.5 g/dL    A-G Ratio 1.7 g/dL   LIPASE   Result Value Ref Range    Lipase 36 7 - 58 U/L   MAGNESIUM   Result Value Ref Range    Magnesium 2.0 1.5 - 2.5 mg/dL   CREATINE KINASE   Result Value Ref Range    CPK Total 1155 (H) 0 - 154 U/L   COV-2, FLU A/B, AND RSV BY PCR (2-4 HOURS CEPHEID): Collect NP swab in VTM    Specimen: Nasopharyngeal; Respirate   Result Value Ref Range    Influenza virus A RNA Negative Negative    Influenza virus B, PCR Negative Negative    RSV, PCR Negative Negative    SARS-CoV-2 by PCR NotDetected     SARS-CoV-2 Source NP Swab    ESTIMATED GFR   Result Value Ref Range    GFR If African American >60 >60 mL/min/1.73 m 2    GFR If Non African American >60 >60 mL/min/1.73 m 2   CREATINE KINASE   Result Value Ref Range    CPK Total 928 (H) 0 - 154 U/L       RADIOLOGY  CT-HEAD W/O   Final Result      No acute intracranial abnormality.          COURSE & MEDICAL DECISION MAKING    This is a 31 y.o. male who presents with multiple complaints in the setting of recent alcohol abuse and also recent head injury.    Differential Diagnosis includes but is not limited to:  Alcohol intoxication, alcohol withdrawal, dehydration, rhabdomyolysis, pancreatitis, concussion, intracranial hemorrhage    ED Course:  31-year-old male with many complaints.  He has been drinking heavily and has outdoor exposure.  Plan labs brain imaging given unreliable historian and close reevaluation.  I will give him parenteral medications and fluids for symptom relief, I must keep him n.p.o. until a surgical process is ruled out.    Thankfully work-up today is reassuring aside from mild rhabdomyolysis, no evidence of acute kidney or liver failure.  CBC reassuring white count normal no fevers doubt infection.  No critical anemia.  CK  downtrending with IV fluids.  Patient feeling much better, tolerating p.o.  I think  he may self hydrate.  He is stable at this time, he was given outpatient mental health resources and he would like to go to [facility] for help with his alcoholism.  At this point I feel he is medically cleared, he was given a cab voucher so that he may go straight to a psychiatric facility for help with his substance abuse.  He will return to the ER for any new or worsening symptoms.    Medications   lactated ringers infusion (BOLUS) (0 mL Intravenous Stopped 4/20/21 0119)   famotidine (PEPCID) injection 20 mg (20 mg Intravenous Given 4/20/21 0025)   LORazepam (ATIVAN) injection 1 mg (1 mg Intravenous Given 4/20/21 0025)   lactated ringers infusion (BOLUS) (0 mL Intravenous Stopped 4/20/21 0211)   chlordiazePOXIDE (LIBRIUM) capsule 25 mg (25 mg Oral Given 4/20/21 0237)       FINAL IMPRESSION  1. Alcohol abuse    2. Closed head injury, initial encounter    3. Methamphetamine abuse (HCC)    4. Dehydration    5. Non-traumatic rhabdomyolysis        PRESCRIPTIONS  Discharge Medication List as of 4/20/2021  2:56 AM          FOLLOW UP  [facility], Emergency Dept  [address]  [facility] [id number] [phone]  Today  If you have ANY new or worse symptoms!    [address] [address] [phone]  Schedule an appointment as soon as possible for a visit in 2 days  for recheck and routine health care      -DISCHARGE-       Results, exam findings, clinical impression, presumed diagnosis, treatment options, and strict return precautions were discussed with the patient, and they verbalized understanding, agreed with, and appreciated the plan of care.    Pertinent Labs & Imaging studies reviewed and verified by myself, as well as nursing notes and the patient's past medical, family, and social histories (See chart for details).    Portions of this  record were made with voice recognition software.  Despite my review, spelling/grammar/context errors may still remain.  Interpretation of this chart should be taken in this context.    Electronically signed by Kavon Contreras M.D. on 4/20/2021 at 6:05 AM.

## 2021-04-20 NOTE — ED NOTES
IV started with blood drawn and medicated as ordered    IV fluid infusing as ordered    COVID swab obtained sent to lab.     Pending CT scan study to be done

## 2021-04-20 NOTE — ED NOTES
Written and verbal discharge instructions given to patient. Patient acknowledges and reports understanding of instructions.  Patient is agreeable to discharge at this time.  D/C to lobby pending taxi arrival.

## 2021-05-15 ENCOUNTER — APPOINTMENT (OUTPATIENT)
Dept: RADIOLOGY | Facility: MEDICAL CENTER | Age: 31
End: 2021-05-15
Attending: EMERGENCY MEDICINE
Payer: MEDICAID

## 2021-05-15 ENCOUNTER — HOSPITAL ENCOUNTER (EMERGENCY)
Facility: MEDICAL CENTER | Age: 31
End: 2021-05-15
Attending: EMERGENCY MEDICINE
Payer: MEDICAID

## 2021-05-15 VITALS
WEIGHT: 182.98 LBS | HEART RATE: 75 BPM | DIASTOLIC BLOOD PRESSURE: 82 MMHG | SYSTOLIC BLOOD PRESSURE: 144 MMHG | HEIGHT: 69 IN | OXYGEN SATURATION: 94 % | BODY MASS INDEX: 27.1 KG/M2 | RESPIRATION RATE: 17 BRPM | TEMPERATURE: 98.3 F

## 2021-05-15 DIAGNOSIS — R74.8 ELEVATED CPK: ICD-10-CM

## 2021-05-15 DIAGNOSIS — R07.9 CHEST PAIN, UNSPECIFIED TYPE: ICD-10-CM

## 2021-05-15 DIAGNOSIS — F19.10 DRUG ABUSE (HCC): ICD-10-CM

## 2021-05-15 LAB
ALBUMIN SERPL BCP-MCNC: 4.4 G/DL (ref 3.2–4.9)
ALBUMIN/GLOB SERPL: 1.9 G/DL
ALP SERPL-CCNC: 117 U/L (ref 30–99)
ALT SERPL-CCNC: 34 U/L (ref 2–50)
AMPHET UR QL SCN: POSITIVE
ANION GAP SERPL CALC-SCNC: 12 MMOL/L (ref 7–16)
AST SERPL-CCNC: 45 U/L (ref 12–45)
BARBITURATES UR QL SCN: NEGATIVE
BASOPHILS # BLD AUTO: 0.4 % (ref 0–1.8)
BASOPHILS # BLD: 0.02 K/UL (ref 0–0.12)
BENZODIAZ UR QL SCN: NEGATIVE
BILIRUB SERPL-MCNC: 0.7 MG/DL (ref 0.1–1.5)
BUN SERPL-MCNC: 11 MG/DL (ref 8–22)
BZE UR QL SCN: NEGATIVE
CALCIUM SERPL-MCNC: 9.7 MG/DL (ref 8.5–10.5)
CANNABINOIDS UR QL SCN: NEGATIVE
CHLORIDE SERPL-SCNC: 99 MMOL/L (ref 96–112)
CK SERPL-CCNC: 890 U/L (ref 0–154)
CO2 SERPL-SCNC: 24 MMOL/L (ref 20–33)
CREAT SERPL-MCNC: 0.98 MG/DL (ref 0.5–1.4)
EKG IMPRESSION: NORMAL
EOSINOPHIL # BLD AUTO: 0.03 K/UL (ref 0–0.51)
EOSINOPHIL NFR BLD: 0.6 % (ref 0–6.9)
ERYTHROCYTE [DISTWIDTH] IN BLOOD BY AUTOMATED COUNT: 42.5 FL (ref 35.9–50)
ETHANOL BLD-MCNC: <10.1 MG/DL (ref 0–10)
GLOBULIN SER CALC-MCNC: 2.3 G/DL (ref 1.9–3.5)
GLUCOSE SERPL-MCNC: 92 MG/DL (ref 65–99)
HCT VFR BLD AUTO: 45.6 % (ref 42–52)
HGB BLD-MCNC: 15.5 G/DL (ref 14–18)
IMM GRANULOCYTES # BLD AUTO: 0.01 K/UL (ref 0–0.11)
IMM GRANULOCYTES NFR BLD AUTO: 0.2 % (ref 0–0.9)
LIPASE SERPL-CCNC: 23 U/L (ref 11–82)
LYMPHOCYTES # BLD AUTO: 1.21 K/UL (ref 1–4.8)
LYMPHOCYTES NFR BLD: 24.6 % (ref 22–41)
MCH RBC QN AUTO: 30.7 PG (ref 27–33)
MCHC RBC AUTO-ENTMCNC: 34 G/DL (ref 33.7–35.3)
MCV RBC AUTO: 90.3 FL (ref 81.4–97.8)
METHADONE UR QL SCN: NEGATIVE
MONOCYTES # BLD AUTO: 0.5 K/UL (ref 0–0.85)
MONOCYTES NFR BLD AUTO: 10.2 % (ref 0–13.4)
NEUTROPHILS # BLD AUTO: 3.14 K/UL (ref 1.82–7.42)
NEUTROPHILS NFR BLD: 64 % (ref 44–72)
NRBC # BLD AUTO: 0 K/UL
NRBC BLD-RTO: 0 /100 WBC
OPIATES UR QL SCN: NEGATIVE
OXYCODONE UR QL SCN: NEGATIVE
PCP UR QL SCN: NEGATIVE
PLATELET # BLD AUTO: 219 K/UL (ref 164–446)
PMV BLD AUTO: 9.9 FL (ref 9–12.9)
POTASSIUM SERPL-SCNC: 4.3 MMOL/L (ref 3.6–5.5)
PROPOXYPH UR QL SCN: NEGATIVE
PROT SERPL-MCNC: 6.7 G/DL (ref 6–8.2)
RBC # BLD AUTO: 5.05 M/UL (ref 4.7–6.1)
SODIUM SERPL-SCNC: 135 MMOL/L (ref 135–145)
TROPONIN T SERPL-MCNC: 11 NG/L (ref 6–19)
TROPONIN T SERPL-MCNC: 8 NG/L (ref 6–19)
WBC # BLD AUTO: 4.9 K/UL (ref 4.8–10.8)

## 2021-05-15 PROCEDURE — 700102 HCHG RX REV CODE 250 W/ 637 OVERRIDE(OP): Performed by: EMERGENCY MEDICINE

## 2021-05-15 PROCEDURE — 80307 DRUG TEST PRSMV CHEM ANLYZR: CPT

## 2021-05-15 PROCEDURE — 96374 THER/PROPH/DIAG INJ IV PUSH: CPT

## 2021-05-15 PROCEDURE — 96375 TX/PRO/DX INJ NEW DRUG ADDON: CPT

## 2021-05-15 PROCEDURE — 82077 ASSAY SPEC XCP UR&BREATH IA: CPT

## 2021-05-15 PROCEDURE — A9270 NON-COVERED ITEM OR SERVICE: HCPCS | Performed by: EMERGENCY MEDICINE

## 2021-05-15 PROCEDURE — 700111 HCHG RX REV CODE 636 W/ 250 OVERRIDE (IP): Performed by: EMERGENCY MEDICINE

## 2021-05-15 PROCEDURE — 85025 COMPLETE CBC W/AUTO DIFF WBC: CPT

## 2021-05-15 PROCEDURE — 80053 COMPREHEN METABOLIC PANEL: CPT

## 2021-05-15 PROCEDURE — 93005 ELECTROCARDIOGRAM TRACING: CPT | Performed by: EMERGENCY MEDICINE

## 2021-05-15 PROCEDURE — 83690 ASSAY OF LIPASE: CPT

## 2021-05-15 PROCEDURE — 84484 ASSAY OF TROPONIN QUANT: CPT | Mod: 91

## 2021-05-15 PROCEDURE — 700105 HCHG RX REV CODE 258: Performed by: EMERGENCY MEDICINE

## 2021-05-15 PROCEDURE — 36415 COLL VENOUS BLD VENIPUNCTURE: CPT

## 2021-05-15 PROCEDURE — 99285 EMERGENCY DEPT VISIT HI MDM: CPT

## 2021-05-15 PROCEDURE — 71045 X-RAY EXAM CHEST 1 VIEW: CPT

## 2021-05-15 PROCEDURE — 82550 ASSAY OF CK (CPK): CPT

## 2021-05-15 PROCEDURE — 93005 ELECTROCARDIOGRAM TRACING: CPT

## 2021-05-15 RX ORDER — IBUPROFEN 200 MG
600 TABLET ORAL EVERY 8 HOURS PRN
Status: SHIPPED | COMMUNITY
End: 2021-11-25

## 2021-05-15 RX ORDER — ACETAMINOPHEN 500 MG
1500 TABLET ORAL 2 TIMES DAILY PRN
Status: SHIPPED | COMMUNITY
End: 2021-11-25

## 2021-05-15 RX ORDER — SODIUM CHLORIDE 9 MG/ML
2000 INJECTION, SOLUTION INTRAVENOUS ONCE
Status: COMPLETED | OUTPATIENT
Start: 2021-05-15 | End: 2021-05-15

## 2021-05-15 RX ORDER — LORAZEPAM 2 MG/ML
1 INJECTION INTRAMUSCULAR
Status: DISCONTINUED | OUTPATIENT
Start: 2021-05-15 | End: 2021-05-15 | Stop reason: HOSPADM

## 2021-05-15 RX ORDER — ONDANSETRON 2 MG/ML
4 INJECTION INTRAMUSCULAR; INTRAVENOUS ONCE
Status: COMPLETED | OUTPATIENT
Start: 2021-05-15 | End: 2021-05-15

## 2021-05-15 RX ADMIN — LIDOCAINE HYDROCHLORIDE 30 ML: 20 SOLUTION OROPHARYNGEAL at 10:41

## 2021-05-15 RX ADMIN — FAMOTIDINE 20 MG: 10 INJECTION INTRAVENOUS at 10:40

## 2021-05-15 RX ADMIN — SODIUM CHLORIDE 2000 ML: 9 INJECTION, SOLUTION INTRAVENOUS at 11:25

## 2021-05-15 RX ADMIN — LORAZEPAM 1 MG: 2 INJECTION INTRAMUSCULAR; INTRAVENOUS at 10:40

## 2021-05-15 RX ADMIN — ONDANSETRON 4 MG: 2 INJECTION INTRAMUSCULAR; INTRAVENOUS at 10:40

## 2021-05-15 ASSESSMENT — FIBROSIS 4 INDEX: FIB4 SCORE: 1.14

## 2021-05-15 ASSESSMENT — PAIN DESCRIPTION - DESCRIPTORS: DESCRIPTORS: PRESSURE

## 2021-05-15 NOTE — ED NOTES
Pt resting in room with friend at side. Second liter of fluids infusing. Will redraw labs after fluids finished.

## 2021-05-15 NOTE — ED TRIAGE NOTES
.  Chief Complaint   Patient presents with   • Chest Pain     onset this morning while sitting    • Alcohol Intoxication     last night    • Drug Abuse     meth and ketamine last night     Ambulated to triage with above c/c. Reports daily drinking for apx 1 year. Last night used methamphetamine and Ketamine. Pt anxious at triage. EKG complete on arrival.

## 2021-05-15 NOTE — DISCHARGE PLANNING
ALERT team  note:  Per [facility] Dr. Campbell's request, Writer RN reviewed community CD and MH resources with pt, with written information given, including [facility], [facility], [facility], [facility], and [facility] transportation included in pt's insurance plan; [place] Medicaid insurance plan; pt verbalized understanding; no SI, HI, or self-harm ideation noted; writer RN updated Dr. Campbell; pt to DC to self today

## 2021-05-15 NOTE — ED NOTES
"Pt states he has dx: bipolar and has struggled for years to stay on his meds. Pt states he started by self medicating to feel more normal and has been using meth. Pt admits to being a heavy daily drinker as well as using a lot of ketamine last night as well. Pt states he feels anxious and hurts all over today as well as the chest pain. Pt denies cough, cold congestion. States he needs help with detox today. States, \" I will end up killing myself if I dont get help.\"   "

## 2021-05-15 NOTE — ED PROVIDER NOTES
"ED Provider Note    CHIEF COMPLAINT  Chief Complaint   Patient presents with   • Chest Pain     onset this morning while sitting    • Alcohol Intoxication     last night    • Drug Abuse     meth and ketamine last night       HPI  Abdoul Medina is a 31 y.o. male who presents to the emergency department stating that he used a lot of alcohol and methamphetamine and used ketamine last night.  He says he typically does use a lot of drugs but last night he used more than usual and today his entire body is in pain he had nausea and vomiting this morning 3 hours before coming in and he is having a tightness across the chest.  The patient says \"I am freaking out\" he does not recognize any other exacerbating or alleviating factors or precipitating events.  Chart review shows that the patient has had a lot of recent visits to the emergency department, see chart review below.  I have reviewed the patient's visitation history with him and he confirms that he started coming to the ER more frequently after he started using drugs.    REVIEW OF SYSTEMS no shortness of breath no hemoptysis he is not been ill recently with fever or chills no black or bloody stool or emesis, he says he did not suffer any trauma last night.  All other systems negative    PAST MEDICAL HISTORY  Past Medical History:   Diagnosis Date   • Acne 4/15/2013   • Alcohol ingestion, more than 4 drinks/day 3/4/2016   • Warts 1/22/2013       FAMILY HISTORY  Family History   Problem Relation Age of Onset   • Cancer Neg Hx    • Diabetes Neg Hx    • Heart Disease Neg Hx        SOCIAL HISTORY  Social History     Socioeconomic History   • Marital status: Single     Spouse name: Not on file   • Number of children: 0   • Years of education: Not on file   • Highest education level: Not on file   Occupational History     Employer: OTHER   Tobacco Use   • Smoking status: Never Smoker   • Smokeless tobacco: Never Used   Vaping Use   • Vaping Use: Never used   Substance and " Sexual Activity   • Alcohol use: Yes     Comment: drinking every day for last year    • Drug use: Yes     Types: Inhaled     Comment: marijuana, Meth   • Sexual activity: Not on file   Other Topics Concern   • Not on file   Social History Narrative    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     Social Determinants of Health     Financial Resource Strain:    • Difficulty of Paying Living Expenses:    Food Insecurity:    • Worried About Running Out of Food in the Last Year:    • Ran Out of Food in the Last Year:    Transportation Needs:    • Lack of Transportation (Medical):    • Lack of Transportation (Non-Medical):    Physical Activity:    • Days of Exercise per Week:    • Minutes of Exercise per Session:    Stress:    • Feeling of Stress :    Social Connections:    • Frequency of Communication with Friends and Family:    • Frequency of Social Gatherings with Friends and Family:    • Attends Evangelical Services:    • Active Member of Clubs or Organizations:    • Attends Club or Organization Meetings:    • Marital Status:    Intimate Partner Violence:    • Fear of Current or Ex-Partner:    • Emotionally Abused:    • Physically Abused:    • Sexually Abused:        SURGICAL HISTORY  Past Surgical History:   Procedure Laterality Date   • CLAVICLE ORIF Left        CURRENT MEDICATIONS  Home Medications     Reviewed by Cirilo Zaragoza (Pharmacy Tech) on 05/15/21 at 1514  Med List Status: Complete   Medication Last Dose Status   acetaminophen (TYLENOL) 500 MG Tab 5/13/2021 Active   ciprofloxacin (CIPRO) 500 MG Tab NOT TAKING Active   finasteride (PROSCAR) 5 MG TABS NOT TAKING Active   hydrOXYzine HCl (ATARAX) 50 MG Tab NOT TAKING Active   ibuprofen (ADVIL) 200 MG Tab 5/13/2021 Active   MINOXIDIL, TOPICAL, 5 % SOLN NOT TAKING Active   ondansetron (ZOFRAN ODT) 4 MG TABLET DISPERSIBLE NOT TAKING Active                ALLERGIES  No Known Allergies    PHYSICAL EXAM  VITAL SIGNS: /82   Pulse 75   Temp 36.8 °C  "(98.3 °F) (Temporal)   Resp 17   Ht 1.753 m (5' 9\")   Wt 83 kg (182 lb 15.7 oz)   SpO2 94%   BMI 27.02 kg/m²    Oxygen saturation is interpreted as adequate  Constitutional: Awake lucid verbal cooperative he does not appear toxic, he does seem anxious.  HENT: No sign of trauma to the head  Eyes: Pupils round extraocular motion present no erythema discharge or jaundice  Neck: Trachea midline no JVD  Cardiovascular: Regular rate and rhythm  Lungs: Clear and equal bilaterally with no apparent difficulty breathing  Abdomen/Back: Soft nontender nondistended no rebound guarding or peritoneal findings  Skin: Warm and dry, I do not see any unusual bruising  Musculoskeletal: No acute bony deformity  Neurologic: The patient is awake verbal lucid ambulatory and moving all extremities without difficulty    CHART REVIEW  As noted above the patient has recently become a frequent emergency department visitor, he was here on February 13 of 2020 complaining that his \"body was going crazy\" with chest tightness after using methamphetamine.  The patient was evaluated and this included 2 troponins which were both less than 6 and he was ultimately discharged home.  The patient was back in the emergency department on November 28, 2020 requesting detox care and complaining of anxiety chest pain and palpitations the patient was in the ER on April 12, 2021 with chest pain headache shortness of breath and anxiety after using methamphetamine.  The patient was in the emergency department again on April 19, 2021.    Laboratory  Today in the emergency department CBC shows white blood cell count of 4.9 hemoglobin is adequate at 15.4 complete metabolic panel is unremarkable the first troponin is normal at 11 and a follow-up troponin was normal at 8.  The total CPK was elevated at 890 and for comparison this value was also elevated on April 20, 2021 at 928.    EKG interpretation  A 12-lead EKG shows sinus rhythm 77 bpm there is no pathologic ST " elevation or depression NC interval is 140 ms QTc interval is 453 ms    Radiology  DX-CHEST-PORTABLE (1 VIEW)   Final Result      No acute cardiac or pulmonary abnormalities are identified.            MEDICAL DECISION MAKING and DISPOSITION  In the emergency department an IV was established and the patient was placed on the cardiac monitor, the patient has a mildly elevated total CPK and complains of muscle aches and body pain and I think that this is related to his methamphetamine abuse.  Because of the elevated total CPK the patient was given intravenous fluids and 2 L were administered in the emergency department and reevaluation shows that he is urinating and remains hemodynamically stable.  Also in the emergency department the patient was given intravenous Ativan for anxiety.  He slept for a long period of time he is able to tolerate oral intake he is feeling better.  He ruled out for pulmonary emboli by the PE RC rule and his heart score is 0 indicating extremely low risk that the patient's symptoms represent acute coronary syndrome or myocardial infarction.  I reviewed all the findings with the patient at this point in time I feel that all of his symptoms are related to his drug and alcohol abuse.  Fortunately his total CPK value is not severely elevated.  The patient has been hydrated in the emergency department he generally looks well and I think at this point in time it is safe for him to be discharged.  I have strongly recommended that the patient discontinue his extreme use of drugs and alcohol and he has been provided with information regarding multiple drug and alcohol treatment programs that he can attend if he chooses to do so.  Otherwise if he has a medical emergency he is to return here for recheck    IMPRESSION  1.  Polysubstance abuse  2.  Body pain and chest pain related to #1  3.  Mildly elevated total CPK         Electronically signed by: Sidney Campbell M.D., 5/15/2021 4:00 PM

## 2021-05-15 NOTE — ED NOTES
Discharge instructions given to pt. Prescriptions unchanged. Pt educated, verbalizes understanding. All belongings accounted for. Pt ambulated out of ED with steady gait to wait for his ride to pick him up. PIV removed and dressing applied.

## 2021-05-15 NOTE — ED NOTES
Med rec completed per Pt at bedside.  Allergies reviewed with Pt. No known drug allergies.  Pt denies taking any prescription medications.  No oral antibiotics in last 14 days.  Pt's preferred pharmacy: [facility]

## 2021-05-15 NOTE — DISCHARGE INSTRUCTIONS
Avoid drug and alcohol use.  Enroll in one of the clinical programs available to you to help get off drugs and alcohol.  You have been given a list of resources.  Return here if you require emergency medical care

## 2021-06-27 ENCOUNTER — HOSPITAL ENCOUNTER (EMERGENCY)
Dept: HOSPITAL 8 - ED | Age: 31
Discharge: HOME | End: 2021-06-27
Payer: MEDICAID

## 2021-06-27 VITALS — WEIGHT: 184.97 LBS | HEIGHT: 69 IN | BODY MASS INDEX: 27.4 KG/M2

## 2021-06-27 VITALS — SYSTOLIC BLOOD PRESSURE: 127 MMHG | DIASTOLIC BLOOD PRESSURE: 83 MMHG

## 2021-06-27 DIAGNOSIS — R07.89: Primary | ICD-10-CM

## 2021-06-27 DIAGNOSIS — F10.129: ICD-10-CM
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
## 2021-06-27 DIAGNOSIS — F12.129: ICD-10-CM

## 2021-06-27 PROCEDURE — 93005 ELECTROCARDIOGRAM TRACING: CPT

## 2021-06-27 PROCEDURE — 99283 EMERGENCY DEPT VISIT LOW MDM: CPT

## 2021-06-27 PROCEDURE — 96361 HYDRATE IV INFUSION ADD-ON: CPT

## 2021-06-27 PROCEDURE — 96374 THER/PROPH/DIAG INJ IV PUSH: CPT

## 2021-06-27 NOTE — NUR
Pt states he drank and did meth and weed prior to coming to the hospital. Pt is 
cooperative, connected to all monitors. Asking for food and drink. States he'd 
love to be able to sleep but his brain is wheels are turning and he's unable to 
do so at this time.

## 2021-06-27 NOTE — NUR
PT WALKED TO ROOM.  MD SHOWN EKG. EKG DONE IN TRIAGE.  MD ORDERED SOME ATIVAN 
1MG PO, AND PT MEDICATED, AND PT ON O2 SAT PROBE AND BP.

## 2021-07-31 ENCOUNTER — HOSPITAL ENCOUNTER (EMERGENCY)
Dept: HOSPITAL 8 - ED | Age: 31
Discharge: HOME | End: 2021-07-31
Payer: MEDICAID

## 2021-07-31 VITALS — HEIGHT: 69 IN | WEIGHT: 181 LBS | BODY MASS INDEX: 26.81 KG/M2

## 2021-07-31 VITALS — DIASTOLIC BLOOD PRESSURE: 80 MMHG | SYSTOLIC BLOOD PRESSURE: 141 MMHG

## 2021-07-31 DIAGNOSIS — S61.210A: Primary | ICD-10-CM
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
## 2021-07-31 PROCEDURE — 99282 EMERGENCY DEPT VISIT SF MDM: CPT

## 2021-07-31 PROCEDURE — 12002 RPR S/N/AX/GEN/TRNK2.6-7.5CM: CPT

## 2021-08-24 ENCOUNTER — HOSPITAL ENCOUNTER (EMERGENCY)
Dept: HOSPITAL 8 - ED | Age: 31
Discharge: HOME | End: 2021-08-24
Payer: MEDICAID

## 2021-08-24 VITALS — DIASTOLIC BLOOD PRESSURE: 92 MMHG | SYSTOLIC BLOOD PRESSURE: 126 MMHG

## 2021-08-24 VITALS — WEIGHT: 178.57 LBS | HEIGHT: 69 IN | BODY MASS INDEX: 26.45 KG/M2

## 2021-08-24 DIAGNOSIS — R07.89: Primary | ICD-10-CM

## 2021-08-24 DIAGNOSIS — F41.1: ICD-10-CM

## 2021-08-24 DIAGNOSIS — F15.10: ICD-10-CM

## 2021-08-24 DIAGNOSIS — R06.02: ICD-10-CM

## 2021-08-24 LAB
ALBUMIN SERPL-MCNC: 3.8 G/DL (ref 3.4–5)
ALP SERPL-CCNC: 140 U/L (ref 45–117)
ALT SERPL-CCNC: 46 U/L (ref 12–78)
ANION GAP SERPL CALC-SCNC: 8 MMOL/L (ref 5–15)
BASOPHILS # BLD AUTO: 0 X10^3/UL (ref 0–0.1)
BASOPHILS NFR BLD AUTO: 1 % (ref 0–1)
BILIRUB SERPL-MCNC: 0.7 MG/DL (ref 0.2–1)
CALCIUM SERPL-MCNC: 8.9 MG/DL (ref 8.5–10.1)
CHLORIDE SERPL-SCNC: 105 MMOL/L (ref 98–107)
CREAT SERPL-MCNC: 1.04 MG/DL (ref 0.7–1.3)
EOSINOPHIL # BLD AUTO: 0.1 X10^3/UL (ref 0–0.4)
EOSINOPHIL NFR BLD AUTO: 2 % (ref 1–7)
ERYTHROCYTE [DISTWIDTH] IN BLOOD BY AUTOMATED COUNT: 13.1 % (ref 9.4–14.8)
LYMPHOCYTES # BLD AUTO: 1.6 X10^3/UL (ref 1–3.4)
LYMPHOCYTES NFR BLD AUTO: 31 % (ref 22–44)
MCH RBC QN AUTO: 30.4 PG (ref 27.5–34.5)
MCHC RBC AUTO-ENTMCNC: 34 G/DL (ref 33.2–36.2)
MONOCYTES # BLD AUTO: 0.5 X10^3/UL (ref 0.2–0.8)
MONOCYTES NFR BLD AUTO: 11 % (ref 2–9)
NEUTROPHILS # BLD AUTO: 2.8 X10^3/UL (ref 1.8–6.8)
NEUTROPHILS NFR BLD AUTO: 56 % (ref 42–75)
PLATELET # BLD AUTO: 193 X10^3/UL (ref 130–400)
PMV BLD AUTO: 8 FL (ref 7.4–10.4)
PROT SERPL-MCNC: 7 G/DL (ref 6.4–8.2)
RBC # BLD AUTO: 4.73 X10^6/UL (ref 4.38–5.82)
TROPONIN I SERPL-MCNC: < 0.015 NG/ML (ref 0–0.04)

## 2021-08-24 PROCEDURE — 71045 X-RAY EXAM CHEST 1 VIEW: CPT

## 2021-08-24 PROCEDURE — 93005 ELECTROCARDIOGRAM TRACING: CPT

## 2021-08-24 PROCEDURE — 99285 EMERGENCY DEPT VISIT HI MDM: CPT

## 2021-08-24 PROCEDURE — 84484 ASSAY OF TROPONIN QUANT: CPT

## 2021-08-24 PROCEDURE — 80053 COMPREHEN METABOLIC PANEL: CPT

## 2021-08-24 PROCEDURE — 99283 EMERGENCY DEPT VISIT LOW MDM: CPT

## 2021-08-24 PROCEDURE — 36415 COLL VENOUS BLD VENIPUNCTURE: CPT

## 2021-08-24 PROCEDURE — 85025 COMPLETE CBC W/AUTO DIFF WBC: CPT

## 2021-08-24 NOTE — NUR
biba from walking to home. pt c/o of sob, cp, dizzyness, migraine, bodyaches 
and SI

pt was out drinking all last night and doing meth. pt appears anxious.

pt says he has thoughts of hurting himself by climmbing [place] and jumping 
off but has had no intention on going through with this plan. 



attached to card/sp02/bp monitos. vss. 

bed in low [facility], changed in to gown, rails engaged. wctm

## 2021-09-18 ENCOUNTER — HOSPITAL ENCOUNTER (EMERGENCY)
Facility: MEDICAL CENTER | Age: 31
End: 2021-09-19
Attending: EMERGENCY MEDICINE
Payer: MEDICAID

## 2021-09-18 ENCOUNTER — APPOINTMENT (OUTPATIENT)
Dept: RADIOLOGY | Facility: MEDICAL CENTER | Age: 31
End: 2021-09-18
Attending: EMERGENCY MEDICINE
Payer: MEDICAID

## 2021-09-18 DIAGNOSIS — S20.212A CONTUSION OF RIB ON LEFT SIDE, INITIAL ENCOUNTER: ICD-10-CM

## 2021-09-18 DIAGNOSIS — S00.83XA CONTUSION OF FACE, INITIAL ENCOUNTER: ICD-10-CM

## 2021-09-18 DIAGNOSIS — F19.10 POLYSUBSTANCE ABUSE (HCC): ICD-10-CM

## 2021-09-18 DIAGNOSIS — S93.409A SPRAIN OF ANKLE, UNSPECIFIED LATERALITY, UNSPECIFIED LIGAMENT, INITIAL ENCOUNTER: ICD-10-CM

## 2021-09-18 DIAGNOSIS — S09.90XA CLOSED HEAD INJURY, INITIAL ENCOUNTER: ICD-10-CM

## 2021-09-18 LAB
ALBUMIN SERPL BCP-MCNC: 4.6 G/DL (ref 3.2–4.9)
ALBUMIN/GLOB SERPL: 1.8 G/DL
ALP SERPL-CCNC: 147 U/L (ref 30–99)
ALT SERPL-CCNC: 35 U/L (ref 2–50)
AMPHET UR QL SCN: POSITIVE
ANION GAP SERPL CALC-SCNC: 12 MMOL/L (ref 7–16)
AST SERPL-CCNC: 50 U/L (ref 12–45)
BARBITURATES UR QL SCN: NEGATIVE
BASOPHILS # BLD AUTO: 0.5 % (ref 0–1.8)
BASOPHILS # BLD: 0.03 K/UL (ref 0–0.12)
BENZODIAZ UR QL SCN: NEGATIVE
BILIRUB SERPL-MCNC: 0.7 MG/DL (ref 0.1–1.5)
BUN SERPL-MCNC: 12 MG/DL (ref 8–22)
BZE UR QL SCN: NEGATIVE
CALCIUM SERPL-MCNC: 10.1 MG/DL (ref 8.5–10.5)
CANNABINOIDS UR QL SCN: NEGATIVE
CHLORIDE SERPL-SCNC: 100 MMOL/L (ref 96–112)
CO2 SERPL-SCNC: 24 MMOL/L (ref 20–33)
CREAT SERPL-MCNC: 0.96 MG/DL (ref 0.5–1.4)
EKG IMPRESSION: NORMAL
EOSINOPHIL # BLD AUTO: 0.03 K/UL (ref 0–0.51)
EOSINOPHIL NFR BLD: 0.5 % (ref 0–6.9)
ERYTHROCYTE [DISTWIDTH] IN BLOOD BY AUTOMATED COUNT: 43.6 FL (ref 35.9–50)
ETHANOL BLD-MCNC: <10.1 MG/DL (ref 0–10)
GLOBULIN SER CALC-MCNC: 2.6 G/DL (ref 1.9–3.5)
GLUCOSE SERPL-MCNC: 97 MG/DL (ref 65–99)
HCT VFR BLD AUTO: 42.2 % (ref 42–52)
HGB BLD-MCNC: 14.6 G/DL (ref 14–18)
IMM GRANULOCYTES # BLD AUTO: 0.01 K/UL (ref 0–0.11)
IMM GRANULOCYTES NFR BLD AUTO: 0.2 % (ref 0–0.9)
LYMPHOCYTES # BLD AUTO: 0.98 K/UL (ref 1–4.8)
LYMPHOCYTES NFR BLD: 16 % (ref 22–41)
MCH RBC QN AUTO: 30.7 PG (ref 27–33)
MCHC RBC AUTO-ENTMCNC: 34.6 G/DL (ref 33.7–35.3)
MCV RBC AUTO: 88.8 FL (ref 81.4–97.8)
METHADONE UR QL SCN: NEGATIVE
MONOCYTES # BLD AUTO: 0.55 K/UL (ref 0–0.85)
MONOCYTES NFR BLD AUTO: 9 % (ref 0–13.4)
NEUTROPHILS # BLD AUTO: 4.53 K/UL (ref 1.82–7.42)
NEUTROPHILS NFR BLD: 73.8 % (ref 44–72)
NRBC # BLD AUTO: 0 K/UL
NRBC BLD-RTO: 0 /100 WBC
OPIATES UR QL SCN: NEGATIVE
OXYCODONE UR QL SCN: NEGATIVE
PCP UR QL SCN: NEGATIVE
PLATELET # BLD AUTO: 228 K/UL (ref 164–446)
PMV BLD AUTO: 9.7 FL (ref 9–12.9)
POC BREATHALIZER: 0 PERCENT (ref 0–0.01)
POTASSIUM SERPL-SCNC: 3.9 MMOL/L (ref 3.6–5.5)
PROPOXYPH UR QL SCN: NEGATIVE
PROT SERPL-MCNC: 7.2 G/DL (ref 6–8.2)
RBC # BLD AUTO: 4.75 M/UL (ref 4.7–6.1)
SODIUM SERPL-SCNC: 136 MMOL/L (ref 135–145)
WBC # BLD AUTO: 6.1 K/UL (ref 4.8–10.8)

## 2021-09-18 PROCEDURE — 302970 POC BREATHALIZER: Performed by: EMERGENCY MEDICINE

## 2021-09-18 PROCEDURE — 302970 POC BREATHALIZER

## 2021-09-18 PROCEDURE — 700102 HCHG RX REV CODE 250 W/ 637 OVERRIDE(OP): Performed by: EMERGENCY MEDICINE

## 2021-09-18 PROCEDURE — 93005 ELECTROCARDIOGRAM TRACING: CPT | Performed by: EMERGENCY MEDICINE

## 2021-09-18 PROCEDURE — 700111 HCHG RX REV CODE 636 W/ 250 OVERRIDE (IP): Performed by: EMERGENCY MEDICINE

## 2021-09-18 PROCEDURE — 71045 X-RAY EXAM CHEST 1 VIEW: CPT

## 2021-09-18 PROCEDURE — 80307 DRUG TEST PRSMV CHEM ANLYZR: CPT

## 2021-09-18 PROCEDURE — A9270 NON-COVERED ITEM OR SERVICE: HCPCS | Performed by: EMERGENCY MEDICINE

## 2021-09-18 PROCEDURE — 700101 HCHG RX REV CODE 250: Performed by: EMERGENCY MEDICINE

## 2021-09-18 PROCEDURE — 73610 X-RAY EXAM OF ANKLE: CPT | Mod: RT

## 2021-09-18 PROCEDURE — 70450 CT HEAD/BRAIN W/O DYE: CPT

## 2021-09-18 PROCEDURE — 96366 THER/PROPH/DIAG IV INF ADDON: CPT

## 2021-09-18 PROCEDURE — 80053 COMPREHEN METABOLIC PANEL: CPT

## 2021-09-18 PROCEDURE — 85025 COMPLETE CBC W/AUTO DIFF WBC: CPT

## 2021-09-18 PROCEDURE — 90791 PSYCH DIAGNOSTIC EVALUATION: CPT

## 2021-09-18 PROCEDURE — 73610 X-RAY EXAM OF ANKLE: CPT | Mod: LT

## 2021-09-18 PROCEDURE — 99285 EMERGENCY DEPT VISIT HI MDM: CPT

## 2021-09-18 PROCEDURE — 70486 CT MAXILLOFACIAL W/O DYE: CPT

## 2021-09-18 PROCEDURE — 82077 ASSAY SPEC XCP UR&BREATH IA: CPT

## 2021-09-18 PROCEDURE — 96365 THER/PROPH/DIAG IV INF INIT: CPT

## 2021-09-18 PROCEDURE — 96375 TX/PRO/DX INJ NEW DRUG ADDON: CPT

## 2021-09-18 RX ORDER — HYDROCODONE BITARTRATE AND ACETAMINOPHEN 5; 325 MG/1; MG/1
1 TABLET ORAL ONCE
Status: COMPLETED | OUTPATIENT
Start: 2021-09-18 | End: 2021-09-18

## 2021-09-18 RX ORDER — LORAZEPAM 2 MG/ML
1 INJECTION INTRAMUSCULAR
Status: DISCONTINUED | OUTPATIENT
Start: 2021-09-18 | End: 2021-09-19 | Stop reason: HOSPADM

## 2021-09-18 RX ORDER — ONDANSETRON 2 MG/ML
4 INJECTION INTRAMUSCULAR; INTRAVENOUS EVERY 6 HOURS PRN
Status: DISCONTINUED | OUTPATIENT
Start: 2021-09-18 | End: 2021-09-19 | Stop reason: HOSPADM

## 2021-09-18 RX ADMIN — ONDANSETRON 4 MG: 2 INJECTION INTRAMUSCULAR; INTRAVENOUS at 17:11

## 2021-09-18 RX ADMIN — THIAMINE HYDROCHLORIDE: 100 INJECTION, SOLUTION INTRAMUSCULAR; INTRAVENOUS at 17:11

## 2021-09-18 RX ADMIN — LORAZEPAM 1 MG: 2 INJECTION INTRAMUSCULAR; INTRAVENOUS at 17:11

## 2021-09-18 RX ADMIN — HYDROCODONE BITARTRATE AND ACETAMINOPHEN 1 TABLET: 5; 325 TABLET ORAL at 17:24

## 2021-09-18 ASSESSMENT — LIFESTYLE VARIABLES
DOES PATIENT WANT TO STOP DRINKING: YES
DOES PATIENT WANT TO TALK TO SOMEONE ABOUT QUITTING: YES
DO YOU DRINK ALCOHOL: YES

## 2021-09-18 ASSESSMENT — PAIN DESCRIPTION - PAIN TYPE
TYPE: ACUTE PAIN
TYPE: ACUTE PAIN

## 2021-09-18 ASSESSMENT — FIBROSIS 4 INDEX: FIB4 SCORE: 1.13
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
## 2021-09-18 NOTE — ED NOTES
Pt ambulated to GRN 30 with steady gait.  Pt belongings in bag, room cleared.  SI precautions in place, 1:1 sitter in place.  Chart up for ERP
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
## 2021-09-19 VITALS
HEART RATE: 79 BPM | RESPIRATION RATE: 18 BRPM | HEIGHT: 69 IN | DIASTOLIC BLOOD PRESSURE: 97 MMHG | BODY MASS INDEX: 26.64 KG/M2 | WEIGHT: 179.9 LBS | SYSTOLIC BLOOD PRESSURE: 141 MMHG | TEMPERATURE: 97 F | OXYGEN SATURATION: 97 %

## 2021-09-19 NOTE — DISCHARGE PLANNING
Referral: Legal hold Transfer to Mental Health Facility    Intervention: ARTIE received call from [place] from [place] stating that they have accepted the patient for admission.     Pt's accepting physcian is Dr. Alexandre ALVA arranged for transportation to be set up through [facility].  ARTIE placed call to [facility] and completed request with Noah.     The pt will be picked up at 0100.     ARTIE notified the RN of the departure time as well as accepting facility.     ARTIE created transfer packet and placed on chart.     Plan: Pt will transfer to  at 0100

## 2021-09-19 NOTE — ED NOTES
Pt resting on gurney with unlabored even chest rise and fall, awakes easily to stimuli, 1:1 sitter in place, will continue to monitor.

## 2021-09-19 NOTE — DISCHARGE PLANNING
Per Dr Lopez pt should be cleared medically within short time  Informed Medical Social Work that pt continues to be on a Legal Hold and at risk for DTS and in need of inpt bed on [employer].
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
## 2021-09-19 NOTE — DISCHARGE PLANNING
Pas per [facility] chart :  Head CT  wnl  Ankle xray wnl  VS normalizing at this time.  Will inform Medical Social Worker

## 2021-09-19 NOTE — DISCHARGE PLANNING
ALERT team  note:  Writer RN reviewed community CD and MH resources with pt, with written information given, including AA/NA mtgs, [facility], [facility], [facility],,[facility]; also reviewed MTM transportation included in pt's insurance plan; pt verbalized understanding with with pt verbal consent, writer RN faxed pt referral to [facility] outpt MH; no current outpt MH provider, last therapy appt with his therapist, lorri, approx four months ago; current psych meds inlcude Remeron 15 mg PO dailly and Trazodone 100 mg PO daily with last dose approx 1 week ago, RX ran out, prescribed by psychiatrist at [facility] from last inpt tx 1 month ago; current substance use includes ETOH daily 1/5 liquor daily with last use this AM, Methamphetamines 3 x week smoking with last use 10/16/21, and THC occasionally with last use 1 week ago; pt unemployed, states he has applied for SSDI; living with his girlfriend and now states he will not be living with her    [employer] insurance plan

## 2021-09-19 NOTE — ED NOTES
Pt sleeping in [facility]. 1:1 sitter in place, even and unlabored chest rise and fall noted

## 2021-09-19 NOTE — DISCHARGE PLANNING
Referral: Legal Hold    Intervention: Legal Hold Paperwork given to SW by [employer] RN Traci    Legal Hold Initiated: Date: 9/18/21 Time: 1700    Patient’s Insurance Listed on Face Sheet: [place] Medicaid    Referrals sent to: [facility] and [facility]    This referral contains the following information:  1) Face sheet _x___  2) Page 1 and Page 2 of Legal Hold __x__  3) Alert Team Assessment/Psych Assessment _x___  4) Head to toe physical exam __x__  5) Urine Drug Screen _x___  6) Blood Alcohol __x__  7) Vital signs __x__  8) Pregnancy test when applicable ___  9) Medications list _x___    Plan: Patient will transfer to mental health facility once acceptance is obtained

## 2021-11-25 ENCOUNTER — HOSPITAL ENCOUNTER (EMERGENCY)
Facility: MEDICAL CENTER | Age: 31
End: 2021-11-25
Attending: EMERGENCY MEDICINE
Payer: MEDICAID

## 2021-11-25 ENCOUNTER — APPOINTMENT (OUTPATIENT)
Dept: RADIOLOGY | Facility: MEDICAL CENTER | Age: 31
End: 2021-11-25
Attending: EMERGENCY MEDICINE
Payer: MEDICAID

## 2021-11-25 VITALS
WEIGHT: 175 LBS | HEIGHT: 69 IN | RESPIRATION RATE: 20 BRPM | TEMPERATURE: 98 F | BODY MASS INDEX: 25.92 KG/M2 | HEART RATE: 98 BPM | SYSTOLIC BLOOD PRESSURE: 122 MMHG | OXYGEN SATURATION: 95 % | DIASTOLIC BLOOD PRESSURE: 77 MMHG

## 2021-11-25 DIAGNOSIS — F10.10 ALCOHOL ABUSE: ICD-10-CM

## 2021-11-25 DIAGNOSIS — F15.10 METHAMPHETAMINE ABUSE (HCC): ICD-10-CM

## 2021-11-25 DIAGNOSIS — B34.9 VIRAL ILLNESS: ICD-10-CM

## 2021-11-25 LAB
ALBUMIN SERPL BCP-MCNC: 4.4 G/DL (ref 3.2–4.9)
ALBUMIN/GLOB SERPL: 1.6 G/DL
ALP SERPL-CCNC: 114 U/L (ref 30–99)
ALT SERPL-CCNC: 36 U/L (ref 2–50)
ANION GAP SERPL CALC-SCNC: 9 MMOL/L (ref 7–16)
AST SERPL-CCNC: 41 U/L (ref 12–45)
BASOPHILS # BLD AUTO: 0.2 % (ref 0–1.8)
BASOPHILS # BLD: 0.02 K/UL (ref 0–0.12)
BILIRUB SERPL-MCNC: 0.5 MG/DL (ref 0.1–1.5)
BUN SERPL-MCNC: 10 MG/DL (ref 8–22)
CALCIUM SERPL-MCNC: 9.1 MG/DL (ref 8.4–10.2)
CHLORIDE SERPL-SCNC: 98 MMOL/L (ref 96–112)
CO2 SERPL-SCNC: 26 MMOL/L (ref 20–33)
CREAT SERPL-MCNC: 1.02 MG/DL (ref 0.5–1.4)
EOSINOPHIL # BLD AUTO: 0.01 K/UL (ref 0–0.51)
EOSINOPHIL NFR BLD: 0.1 % (ref 0–6.9)
ERYTHROCYTE [DISTWIDTH] IN BLOOD BY AUTOMATED COUNT: 42.2 FL (ref 35.9–50)
FLUAV RNA SPEC QL NAA+PROBE: NEGATIVE
FLUBV RNA SPEC QL NAA+PROBE: NEGATIVE
GLOBULIN SER CALC-MCNC: 2.7 G/DL (ref 1.9–3.5)
GLUCOSE SERPL-MCNC: 118 MG/DL (ref 65–99)
HCT VFR BLD AUTO: 44.2 % (ref 42–52)
HGB BLD-MCNC: 14.8 G/DL (ref 14–18)
IMM GRANULOCYTES # BLD AUTO: 0.09 K/UL (ref 0–0.11)
IMM GRANULOCYTES NFR BLD AUTO: 0.9 % (ref 0–0.9)
LIPASE SERPL-CCNC: 35 U/L (ref 7–58)
LYMPHOCYTES # BLD AUTO: 0.55 K/UL (ref 1–4.8)
LYMPHOCYTES NFR BLD: 5.6 % (ref 22–41)
MCH RBC QN AUTO: 30.8 PG (ref 27–33)
MCHC RBC AUTO-ENTMCNC: 33.5 G/DL (ref 33.7–35.3)
MCV RBC AUTO: 91.9 FL (ref 81.4–97.8)
MONOCYTES # BLD AUTO: 0.62 K/UL (ref 0–0.85)
MONOCYTES NFR BLD AUTO: 6.3 % (ref 0–13.4)
NEUTROPHILS # BLD AUTO: 8.59 K/UL (ref 1.82–7.42)
NEUTROPHILS NFR BLD: 86.9 % (ref 44–72)
NRBC # BLD AUTO: 0 K/UL
NRBC BLD-RTO: 0 /100 WBC
PLATELET # BLD AUTO: 177 K/UL (ref 164–446)
PMV BLD AUTO: 9.8 FL (ref 9–12.9)
POTASSIUM SERPL-SCNC: 4.1 MMOL/L (ref 3.6–5.5)
PROT SERPL-MCNC: 7.1 G/DL (ref 6–8.2)
RBC # BLD AUTO: 4.81 M/UL (ref 4.7–6.1)
RSV RNA SPEC QL NAA+PROBE: NEGATIVE
SARS-COV-2 RNA RESP QL NAA+PROBE: NOTDETECTED
SODIUM SERPL-SCNC: 133 MMOL/L (ref 135–145)
SPECIMEN SOURCE: NORMAL
WBC # BLD AUTO: 9.9 K/UL (ref 4.8–10.8)

## 2021-11-25 PROCEDURE — 96374 THER/PROPH/DIAG INJ IV PUSH: CPT

## 2021-11-25 PROCEDURE — 700105 HCHG RX REV CODE 258: Performed by: EMERGENCY MEDICINE

## 2021-11-25 PROCEDURE — 0241U HCHG SARS-COV-2 COVID-19 NFCT DS RESP RNA 4 TRGT MIC: CPT

## 2021-11-25 PROCEDURE — 80053 COMPREHEN METABOLIC PANEL: CPT

## 2021-11-25 PROCEDURE — 85025 COMPLETE CBC W/AUTO DIFF WBC: CPT

## 2021-11-25 PROCEDURE — 700111 HCHG RX REV CODE 636 W/ 250 OVERRIDE (IP): Performed by: EMERGENCY MEDICINE

## 2021-11-25 PROCEDURE — A9270 NON-COVERED ITEM OR SERVICE: HCPCS | Performed by: EMERGENCY MEDICINE

## 2021-11-25 PROCEDURE — C9803 HOPD COVID-19 SPEC COLLECT: HCPCS | Performed by: EMERGENCY MEDICINE

## 2021-11-25 PROCEDURE — 71045 X-RAY EXAM CHEST 1 VIEW: CPT

## 2021-11-25 PROCEDURE — 99284 EMERGENCY DEPT VISIT MOD MDM: CPT

## 2021-11-25 PROCEDURE — 96375 TX/PRO/DX INJ NEW DRUG ADDON: CPT

## 2021-11-25 PROCEDURE — 700102 HCHG RX REV CODE 250 W/ 637 OVERRIDE(OP): Performed by: EMERGENCY MEDICINE

## 2021-11-25 PROCEDURE — 83690 ASSAY OF LIPASE: CPT

## 2021-11-25 RX ORDER — KETOROLAC TROMETHAMINE 30 MG/ML
30 INJECTION, SOLUTION INTRAMUSCULAR; INTRAVENOUS ONCE
Status: COMPLETED | OUTPATIENT
Start: 2021-11-25 | End: 2021-11-25

## 2021-11-25 RX ORDER — ONDANSETRON 2 MG/ML
4 INJECTION INTRAMUSCULAR; INTRAVENOUS ONCE
Status: COMPLETED | OUTPATIENT
Start: 2021-11-25 | End: 2021-11-25

## 2021-11-25 RX ORDER — ACETAMINOPHEN 325 MG/1
650 TABLET ORAL ONCE
Status: COMPLETED | OUTPATIENT
Start: 2021-11-25 | End: 2021-11-25

## 2021-11-25 RX ORDER — LORAZEPAM 1 MG/1
1 TABLET ORAL ONCE
Status: COMPLETED | OUTPATIENT
Start: 2021-11-25 | End: 2021-11-25

## 2021-11-25 RX ORDER — SODIUM CHLORIDE 9 MG/ML
1000 INJECTION, SOLUTION INTRAVENOUS ONCE
Status: COMPLETED | OUTPATIENT
Start: 2021-11-25 | End: 2021-11-25

## 2021-11-25 RX ORDER — ACETAMINOPHEN 325 MG/1
975 TABLET ORAL EVERY 6 HOURS PRN
Status: SHIPPED | COMMUNITY
End: 2022-07-19

## 2021-11-25 RX ORDER — HYDROCODONE BITARTRATE AND ACETAMINOPHEN 5; 325 MG/1; MG/1
1 TABLET ORAL ONCE
Status: COMPLETED | OUTPATIENT
Start: 2021-11-25 | End: 2021-11-25

## 2021-11-25 RX ADMIN — KETOROLAC TROMETHAMINE 30 MG: 30 INJECTION, SOLUTION INTRAMUSCULAR at 06:45

## 2021-11-25 RX ADMIN — HYDROCODONE BITARTRATE AND ACETAMINOPHEN 1 TABLET: 5; 325 TABLET ORAL at 08:32

## 2021-11-25 RX ADMIN — LORAZEPAM 1 MG: 1 TABLET ORAL at 08:38

## 2021-11-25 RX ADMIN — ACETAMINOPHEN 650 MG: 325 TABLET ORAL at 06:24

## 2021-11-25 RX ADMIN — SODIUM CHLORIDE 1000 ML: 9 INJECTION, SOLUTION INTRAVENOUS at 07:07

## 2021-11-25 RX ADMIN — LORAZEPAM 1 MG: 1 TABLET ORAL at 08:32

## 2021-11-25 RX ADMIN — ONDANSETRON 4 MG: 2 INJECTION INTRAMUSCULAR; INTRAVENOUS at 06:24

## 2021-11-25 ASSESSMENT — FIBROSIS 4 INDEX: FIB4 SCORE: 1.19

## 2021-11-25 NOTE — ED TRIAGE NOTES
"Chief Complaint   Patient presents with   • Shortness of Breath     Pt reports he is SOB with body aches, weakness, headache ad chills that started yesterday.   • Weakness   • Headache   /78   Pulse (!) 115   Temp 36.6 °C (97.9 °F) (Oral)   Resp (!) 22   Ht 1.753 m (5' 9\")   Wt 79.4 kg (175 lb)   SpO2 99%   "

## 2021-11-25 NOTE — ED NOTES
Med rec updated and complete  Allergies reviewed  Pt reports that he has not taken his PROZAC, TRAZODONE, or ZYPREXA in over 3-4 months.  Pt reports no prescription medications or vitamins.  Pt reports no antibiotics in the last 30 days.    No current facility-administered medications on file prior to encounter.     Current Outpatient Medications on File Prior to Encounter   Medication Sig Dispense Refill   • Acetaminophen (TYLENOL PO) Take 2 Tablets by mouth 2 times a day as needed (For pain). Pt is not sure the strength

## 2021-11-25 NOTE — DISCHARGE INSTRUCTIONS
Luckily your labs today do not show evidence of significant dehydration or anemia.  You also do not have Covid.  Go home and rest.  You can take Tylenol or ibuprofen for your body aches.  Any new or different symptoms, worsening symptoms or any concern return for recheck.  I hope you feel better soon.

## 2021-11-25 NOTE — ED PROVIDER NOTES
ED Provider Note    CHIEF COMPLAINT  Chief Complaint   Patient presents with   • Shortness of Breath     Pt reports he is SOB with body aches, weakness, headache ad chills that started yesterday.   • Weakness   • Headache   • Bloody Stools     Reports bright red blood and dark stools.       HPI  Abdoul Medina is a 31 y.o. male who presents with multiple issues.  Patient was seen at [facility] 5 days ago.  At that time he was diagnosed with COVID.  Patient also had ankle pain at that time and an x-ray was done which showed no evidence of fracture.  Presents today because he is having increasing shortness of breath with body aches weakness headaches and chills.  He also has had some vomiting and diarrhea.  States he is had some blood in his vomit and stools.  Patient has a history of alcohol abuse as well as methamphetamine abuse.  He has been drinking recently.  Patient is not vaccinated for COVID.  Denies any history of previous GI bleed, asthma, bleeding tendency, diabetes or impaired immunity.  Denies any previous cardiac problems.  Denies any numbness or tingling in his ankle or feet.  No syncope or dizziness.    REVIEW OF SYSTEMS  See HPI for further details. All other systems are negative.     PAST MEDICAL HISTORY  Past Medical History:   Diagnosis Date   • Alcohol ingestion, more than 4 drinks/day 3/4/2016   • Acne 4/15/2013   • Warts 1/22/2013       FAMILY HISTORY  [unfilled]    SOCIAL HISTORY  Social History     Socioeconomic History   • Marital status: Single     Spouse name: Not on file   • Number of children: 0   • Years of education: Not on file   • Highest education level: Not on file   Occupational History     Employer: OTHER   Tobacco Use   • Smoking status: Never Smoker   • Smokeless tobacco: Never Used   Vaping Use   • Vaping Use: Never used   Substance and Sexual Activity   • Alcohol use: Yes     Comment: drinking every day for last year- 1/5 of whiskey or more    • Drug use: Yes     " Types: Inhaled     Comment: marijuana, Meth   • Sexual activity: Not on file   Other Topics Concern   • Not on file   Social History Narrative    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     Social Determinants of Health     Financial Resource Strain:    • Difficulty of Paying Living Expenses: Not on file   Food Insecurity:    • Worried About Running Out of Food in the Last Year: Not on file   • Ran Out of Food in the Last Year: Not on file   Transportation Needs:    • Lack of Transportation (Medical): Not on file   • Lack of Transportation (Non-Medical): Not on file   Physical Activity:    • Days of Exercise per Week: Not on file   • Minutes of Exercise per Session: Not on file   Stress:    • Feeling of Stress : Not on file   Social Connections:    • Frequency of Communication with Friends and Family: Not on file   • Frequency of Social Gatherings with Friends and Family: Not on file   • Attends Roman [employer] Services: Not on file   • Active Member of Clubs or Organizations: Not on file   • Attends Club or Organization Meetings: Not on file   • Marital Status: Not on file   Intimate Partner Violence:    • Fear of Current or Ex-Partner: Not on file   • Emotionally Abused: Not on file   • Physically Abused: Not on file   • Sexually Abused: Not on file   Housing Stability:    • Unable to Pay for Housing in the Last Year: Not on file   • Number of Places Lived in the Last Year: Not on file   • Unstable Housing in the Last Year: Not on file       SURGICAL HISTORY  Past Surgical History:   Procedure Laterality Date   • CLAVICLE ORIF Left        CURRENT MEDICATIONS   Home Medications    **Home medications have not yet been reviewed for this encounter**         ALLERGIES  No Known Allergies    PHYSICAL EXAM  VITAL SIGNS: /78   Pulse (!) 115   Temp 36.6 °C (97.9 °F) (Oral)   Resp (!) 22   Ht 1.753 m (5' 9\")   Wt 79.4 kg (175 lb)   SpO2 99%   BMI 25.84 kg/m²       Constitutional: Well developed, mild acute " distress, Non-toxic appearance.   HENT: Normocephalic, Atraumatic, Bilateral external ears normal, Nose normal.   Eyes: PERRL, EOMI, Conjunctiva normal, No discharge.   Neck: Normal range of motion, No tenderness, Supple, No stridor.    Cardiovascular: tacycardic heart rate, Normal rhythm  Thorax & Lungs: Normal breath sounds, No respiratory distress, No wheezing, diffuse chest tenderness.   Abdomen: Benign abdominal exam, no guarding no rebound, no tenderness, no distention  Skin: Warm, Dry, No erythema, No rash.   Back: No tenderness, No CVA tenderness.   Extremities: Intact distal pulses, No edema, No tenderness   Neurologic: Alert & oriented x 3, Normal motor function, Normal sensory function, No focal deficits noted.   Psychiatric: appropriate, SI or HI    Labs  Results for orders placed or performed during the hospital encounter of 11/25/21   CBC WITH DIFFERENTIAL   Result Value Ref Range    WBC 9.9 4.8 - 10.8 K/uL    RBC 4.81 4.70 - 6.10 M/uL    Hemoglobin 14.8 14.0 - 18.0 g/dL    Hematocrit 44.2 42.0 - 52.0 %    MCV 91.9 81.4 - 97.8 fL    MCH 30.8 27.0 - 33.0 pg    MCHC 33.5 (L) 33.7 - 35.3 g/dL    RDW 42.2 35.9 - 50.0 fL    Platelet Count 177 164 - 446 K/uL    MPV 9.8 9.0 - 12.9 fL    Neutrophils-Polys 86.90 (H) 44.00 - 72.00 %    Lymphocytes 5.60 (L) 22.00 - 41.00 %    Monocytes 6.30 0.00 - 13.40 %    Eosinophils 0.10 0.00 - 6.90 %    Basophils 0.20 0.00 - 1.80 %    Immature Granulocytes 0.90 0.00 - 0.90 %    Nucleated RBC 0.00 /100 WBC    Neutrophils (Absolute) 8.59 (H) 1.82 - 7.42 K/uL    Lymphs (Absolute) 0.55 (L) 1.00 - 4.80 K/uL    Monos (Absolute) 0.62 0.00 - 0.85 K/uL    Eos (Absolute) 0.01 0.00 - 0.51 K/uL    Baso (Absolute) 0.02 0.00 - 0.12 K/uL    Immature Granulocytes (abs) 0.09 0.00 - 0.11 K/uL    NRBC (Absolute) 0.00 K/uL   COMP METABOLIC PANEL   Result Value Ref Range    Sodium 133 (L) 135 - 145 mmol/L    Potassium 4.1 3.6 - 5.5 mmol/L    Chloride 98 96 - 112 mmol/L    Co2 26 20 - 33 mmol/L     Anion Gap 9.0 7.0 - 16.0    Glucose 118 (H) 65 - 99 mg/dL    Bun 10 8 - 22 mg/dL    Creatinine 1.02 0.50 - 1.40 mg/dL    Calcium 9.1 8.4 - 10.2 mg/dL    AST(SGOT) 41 12 - 45 U/L    ALT(SGPT) 36 2 - 50 U/L    Alkaline Phosphatase 114 (H) 30 - 99 U/L    Total Bilirubin 0.5 0.1 - 1.5 mg/dL    Albumin 4.4 3.2 - 4.9 g/dL    Total Protein 7.1 6.0 - 8.2 g/dL    Globulin 2.7 1.9 - 3.5 g/dL    A-G Ratio 1.6 g/dL   LIPASE   Result Value Ref Range    Lipase 35 7 - 58 U/L   ESTIMATED GFR   Result Value Ref Range    GFR If African American >60 >60 mL/min/1.73 m 2    GFR If Non African American >60 >60 mL/min/1.73 m 2       RADIOLOGY/PROCEDURES  DX-CHEST-PORTABLE (1 VIEW)   Final Result      No radiographic evidence of acute cardiopulmonary process.          COURSE & MEDICAL DECISION MAKING  Pertinent Labs & Imaging studies reviewed. (See chart for details)  Patient presents emergency department with complaints consistent with Covid.  Patient states he was diagnosed with COVID at [place].  I am unable to see that result in his [place] chart.  His symptoms are consistent with likely Covid infection.  Patient is not vaccinated.  Patient also is a history of alcohol and meth abuse.  No history of IV drug use.  Is mildly tachycardic here.  He is able to tolerate p.o. and is requesting water.  Therefore we will try p.o. hydration first.  Patient complains of headache and body aches.  He has no meningeal signs.  Patient is not hypoxic here in acute respiratory distress.  However will obtain an x-ray to further evaluate his shortness of breath.  Patient also reports some blood in his stool and vomit.  We will check a hemoglobin to further evaluate this.  [facility] has been evaluated psychiatry during his last 2 visits.  He has a history of chronic suicidal ideation.  Does not current complain of any suicide or homicidal ideation.  Does have some mild tremor that I think is related to mild alcohol  withdrawal.  We will try a p.o. Ativan.  At this point I do not feel he meets any criteria for a legal hold.    Patient's labs does not show evidence of anemia.  No significant white count abnormality.  No significant electrolyte abnormalities.  Slightly elevated glucose without evidence of a gap acidosis.  No elevated BUN to suggest an upper GI bleed.  Chest x-ray was unremarkable.  Patient is tolerating p.o. here.  Requesting something to eat.  Heart rate is still elevated.  Will give a bolus of fluid.     Heart rate has slightly improved and now is in the high 90s.  Covid test is returned and is negative.  I suspect the patient's is body aches and overall not feeling well likely is related to a mild alcohol withdrawal vs a viral illness.  We will give him a another p.o. dose of Ativan.  Is also complaining of body aches and we will give him a Norco.  He has not had any episodes of hypoxia here.  He could have a viral illness but I do not feel he needs hospitalization at this point.  He has been able to eat here without difficulty.  Has not had any episodes of vomiting or diarrhea here.    Patient is feeling better after the medications.  Vital signs have improved.  I believe he stable for outpatient management.  Return precautions were given.    FINAL IMPRESSION     1. Viral illness    2. Alcohol abuse    3. Methamphetamine abuse (HCC)             Electronically signed by: Abi Patel M.D., 11/25/2021 6:17 AM

## 2021-11-26 ENCOUNTER — HOSPITAL ENCOUNTER (EMERGENCY)
Facility: MEDICAL CENTER | Age: 31
End: 2021-11-26
Attending: EMERGENCY MEDICINE
Payer: MEDICAID

## 2021-11-26 VITALS
RESPIRATION RATE: 16 BRPM | WEIGHT: 177.03 LBS | BODY MASS INDEX: 26.22 KG/M2 | OXYGEN SATURATION: 98 % | HEART RATE: 87 BPM | SYSTOLIC BLOOD PRESSURE: 111 MMHG | DIASTOLIC BLOOD PRESSURE: 68 MMHG | HEIGHT: 69 IN | TEMPERATURE: 98.5 F

## 2021-11-26 DIAGNOSIS — R45.851 SUICIDAL IDEATION: ICD-10-CM

## 2021-11-26 DIAGNOSIS — M79.10 MYALGIA: ICD-10-CM

## 2021-11-26 DIAGNOSIS — J02.0 STREP THROAT: ICD-10-CM

## 2021-11-26 LAB
ALBUMIN SERPL BCP-MCNC: 3.8 G/DL (ref 3.2–4.9)
ALBUMIN/GLOB SERPL: 1.4 G/DL
ALP SERPL-CCNC: 98 U/L (ref 30–99)
ALT SERPL-CCNC: 25 U/L (ref 2–50)
AMPHET UR QL SCN: NEGATIVE
ANION GAP SERPL CALC-SCNC: 15 MMOL/L (ref 7–16)
AST SERPL-CCNC: 29 U/L (ref 12–45)
BARBITURATES UR QL SCN: NEGATIVE
BASOPHILS # BLD AUTO: 0.3 % (ref 0–1.8)
BASOPHILS # BLD: 0.03 K/UL (ref 0–0.12)
BENZODIAZ UR QL SCN: NEGATIVE
BILIRUB SERPL-MCNC: 0.3 MG/DL (ref 0.1–1.5)
BUN SERPL-MCNC: 8 MG/DL (ref 8–22)
BZE UR QL SCN: NEGATIVE
CALCIUM SERPL-MCNC: 9.7 MG/DL (ref 8.4–10.2)
CANNABINOIDS UR QL SCN: NEGATIVE
CHLORIDE SERPL-SCNC: 98 MMOL/L (ref 96–112)
CO2 SERPL-SCNC: 20 MMOL/L (ref 20–33)
CREAT SERPL-MCNC: 1 MG/DL (ref 0.5–1.4)
EOSINOPHIL # BLD AUTO: 0 K/UL (ref 0–0.51)
EOSINOPHIL NFR BLD: 0 % (ref 0–6.9)
ERYTHROCYTE [DISTWIDTH] IN BLOOD BY AUTOMATED COUNT: 39.7 FL (ref 35.9–50)
ETHANOL BLD-MCNC: <10.1 MG/DL (ref 0–10)
GLOBULIN SER CALC-MCNC: 2.8 G/DL (ref 1.9–3.5)
GLUCOSE SERPL-MCNC: 151 MG/DL (ref 65–99)
HCT VFR BLD AUTO: 41.6 % (ref 42–52)
HGB BLD-MCNC: 14.1 G/DL (ref 14–18)
IMM GRANULOCYTES # BLD AUTO: 0.07 K/UL (ref 0–0.11)
IMM GRANULOCYTES NFR BLD AUTO: 0.7 % (ref 0–0.9)
LYMPHOCYTES # BLD AUTO: 0.72 K/UL (ref 1–4.8)
LYMPHOCYTES NFR BLD: 6.8 % (ref 22–41)
MCH RBC QN AUTO: 30.6 PG (ref 27–33)
MCHC RBC AUTO-ENTMCNC: 33.9 G/DL (ref 33.7–35.3)
MCV RBC AUTO: 90.2 FL (ref 81.4–97.8)
METHADONE UR QL SCN: NEGATIVE
MONOCYTES # BLD AUTO: 0.86 K/UL (ref 0–0.85)
MONOCYTES NFR BLD AUTO: 8.1 % (ref 0–13.4)
NEUTROPHILS # BLD AUTO: 8.97 K/UL (ref 1.82–7.42)
NEUTROPHILS NFR BLD: 84.1 % (ref 44–72)
NRBC # BLD AUTO: 0 K/UL
NRBC BLD-RTO: 0 /100 WBC
OPIATES UR QL SCN: NEGATIVE
OXYCODONE UR QL SCN: NEGATIVE
PCP UR QL SCN: NEGATIVE
PLATELET # BLD AUTO: 160 K/UL (ref 164–446)
PMV BLD AUTO: 9.8 FL (ref 9–12.9)
POTASSIUM SERPL-SCNC: 3.6 MMOL/L (ref 3.6–5.5)
PROPOXYPH UR QL SCN: NEGATIVE
PROT SERPL-MCNC: 6.6 G/DL (ref 6–8.2)
RBC # BLD AUTO: 4.61 M/UL (ref 4.7–6.1)
S PYO DNA SPEC NAA+PROBE: DETECTED
SODIUM SERPL-SCNC: 133 MMOL/L (ref 135–145)
WBC # BLD AUTO: 10.7 K/UL (ref 4.8–10.8)

## 2021-11-26 PROCEDURE — 96374 THER/PROPH/DIAG INJ IV PUSH: CPT

## 2021-11-26 PROCEDURE — 700102 HCHG RX REV CODE 250 W/ 637 OVERRIDE(OP): Performed by: EMERGENCY MEDICINE

## 2021-11-26 PROCEDURE — 85025 COMPLETE CBC W/AUTO DIFF WBC: CPT

## 2021-11-26 PROCEDURE — 80307 DRUG TEST PRSMV CHEM ANLYZR: CPT

## 2021-11-26 PROCEDURE — A9270 NON-COVERED ITEM OR SERVICE: HCPCS | Performed by: EMERGENCY MEDICINE

## 2021-11-26 PROCEDURE — 82077 ASSAY SPEC XCP UR&BREATH IA: CPT

## 2021-11-26 PROCEDURE — 700105 HCHG RX REV CODE 258: Performed by: EMERGENCY MEDICINE

## 2021-11-26 PROCEDURE — 87651 STREP A DNA AMP PROBE: CPT

## 2021-11-26 PROCEDURE — 80053 COMPREHEN METABOLIC PANEL: CPT

## 2021-11-26 PROCEDURE — 99285 EMERGENCY DEPT VISIT HI MDM: CPT

## 2021-11-26 PROCEDURE — 36415 COLL VENOUS BLD VENIPUNCTURE: CPT

## 2021-11-26 PROCEDURE — 96375 TX/PRO/DX INJ NEW DRUG ADDON: CPT

## 2021-11-26 PROCEDURE — 700111 HCHG RX REV CODE 636 W/ 250 OVERRIDE (IP): Performed by: EMERGENCY MEDICINE

## 2021-11-26 RX ORDER — CEFTRIAXONE 2 G/1
2 INJECTION, POWDER, FOR SOLUTION INTRAMUSCULAR; INTRAVENOUS ONCE
Status: COMPLETED | OUTPATIENT
Start: 2021-11-26 | End: 2021-11-26

## 2021-11-26 RX ORDER — CLONAZEPAM 0.5 MG/1
1 TABLET ORAL ONCE
Status: COMPLETED | OUTPATIENT
Start: 2021-11-26 | End: 2021-11-26

## 2021-11-26 RX ORDER — FAMOTIDINE 20 MG/1
20 TABLET, FILM COATED ORAL ONCE
Status: COMPLETED | OUTPATIENT
Start: 2021-11-26 | End: 2021-11-26

## 2021-11-26 RX ORDER — SODIUM CHLORIDE 9 MG/ML
1000 INJECTION, SOLUTION INTRAVENOUS ONCE
Status: COMPLETED | OUTPATIENT
Start: 2021-11-26 | End: 2021-11-26

## 2021-11-26 RX ORDER — KETOROLAC TROMETHAMINE 30 MG/ML
30 INJECTION, SOLUTION INTRAMUSCULAR; INTRAVENOUS ONCE
Status: COMPLETED | OUTPATIENT
Start: 2021-11-26 | End: 2021-11-26

## 2021-11-26 RX ORDER — HYDROCODONE BITARTRATE AND ACETAMINOPHEN 5; 325 MG/1; MG/1
2 TABLET ORAL ONCE
Status: COMPLETED | OUTPATIENT
Start: 2021-11-26 | End: 2021-11-26

## 2021-11-26 RX ORDER — IBUPROFEN 200 MG
600 TABLET ORAL EVERY 6 HOURS PRN
Status: SHIPPED | COMMUNITY
End: 2022-07-19

## 2021-11-26 RX ORDER — FLUOXETINE HYDROCHLORIDE 20 MG/1
40 CAPSULE ORAL ONCE
Status: DISCONTINUED | OUTPATIENT
Start: 2021-11-26 | End: 2021-11-26

## 2021-11-26 RX ORDER — IBUPROFEN 600 MG/1
600 TABLET ORAL ONCE
Status: COMPLETED | OUTPATIENT
Start: 2021-11-26 | End: 2021-11-26

## 2021-11-26 RX ORDER — DEXAMETHASONE SODIUM PHOSPHATE 10 MG/ML
10 INJECTION INTRAMUSCULAR; INTRAVENOUS ONCE
Status: COMPLETED | OUTPATIENT
Start: 2021-11-26 | End: 2021-11-26

## 2021-11-26 RX ORDER — FLUOXETINE HYDROCHLORIDE 20 MG/1
40 CAPSULE ORAL ONCE
Status: COMPLETED | OUTPATIENT
Start: 2021-11-26 | End: 2021-11-26

## 2021-11-26 RX ORDER — TRAZODONE HYDROCHLORIDE 50 MG/1
100 TABLET ORAL ONCE
Status: COMPLETED | OUTPATIENT
Start: 2021-11-26 | End: 2021-11-26

## 2021-11-26 RX ORDER — AMOXICILLIN 500 MG/1
1000 CAPSULE ORAL DAILY
Qty: 20 CAPSULE | Refills: 0 | Status: SHIPPED | OUTPATIENT
Start: 2021-11-26 | End: 2021-12-06

## 2021-11-26 RX ADMIN — CEFTRIAXONE SODIUM 2 G: 2 INJECTION, POWDER, FOR SOLUTION INTRAMUSCULAR; INTRAVENOUS at 16:53

## 2021-11-26 RX ADMIN — KETOROLAC TROMETHAMINE 30 MG: 30 INJECTION, SOLUTION INTRAMUSCULAR at 15:04

## 2021-11-26 RX ADMIN — DEXAMETHASONE SODIUM PHOSPHATE 10 MG: 10 INJECTION INTRAMUSCULAR; INTRAVENOUS at 16:54

## 2021-11-26 RX ADMIN — CLONAZEPAM 1 MG: 0.5 TABLET ORAL at 15:10

## 2021-11-26 RX ADMIN — SODIUM CHLORIDE 1000 ML: 9 INJECTION, SOLUTION INTRAVENOUS at 15:04

## 2021-11-26 RX ADMIN — TRAZODONE HYDROCHLORIDE 100 MG: 50 TABLET ORAL at 15:10

## 2021-11-26 RX ADMIN — IBUPROFEN 600 MG: 600 TABLET, FILM COATED ORAL at 21:03

## 2021-11-26 RX ADMIN — FAMOTIDINE 20 MG: 20 TABLET, FILM COATED ORAL at 19:39

## 2021-11-26 RX ADMIN — FLUOXETINE 40 MG: 20 CAPSULE ORAL at 15:10

## 2021-11-26 RX ADMIN — HYDROCODONE BITARTRATE AND ACETAMINOPHEN 2 TABLET: 5; 325 TABLET ORAL at 21:03

## 2021-11-26 ASSESSMENT — FIBROSIS 4 INDEX: FIB4 SCORE: 1.24

## 2021-11-26 ASSESSMENT — PAIN DESCRIPTION - PAIN TYPE
TYPE: ACUTE PAIN
TYPE: ACUTE PAIN

## 2021-11-26 NOTE — ED TRIAGE NOTES
"Pt presents by self from generalized body aches, fatigue, fever, SOB, and HA. Symptoms started 2 days ago. A&Ox4 and ambulatory.   Has been off his psychiatric meds for several months d/t \"sabotaging and controlling girlfriend.\" Has a plan to jump [place] of the [place]. Was attempting to go do this however friends intervened.     Has this patient been vaccinated for COVID no  If not, would they like to be vaccinated while in the ER if eligible?  yes  Would the patient like to speak with the ERP about the possibility of receiving the COVID vaccine today before making a decision? yes    "

## 2021-11-26 NOTE — ED NOTES
Iv placed , labs drawn and taken to lab. poc update given to pt, results pending at this time  Strep collected

## 2021-11-26 NOTE — ED PROVIDER NOTES
"ED Provider Note    CHIEF COMPLAINT  Fevers  Chills  Body aches  Suicidal ideation    HPI  Abdoul Medina is a 31 y.o. male who presents really here for the chief complaint of just body chills and aches has been going on for 48 hours.  He said the diffuse body aches and chills sweats.  Associated fevers.  Not alleviated with Tylenol.  No exacerbating factors.  Associate with sore throat.    He also states that he wants to kill himself he states that he has tried to do that before with alcohol and drugs.  States was in the hospital for that and continues to feel that he feels suicidal states that because of him and his girlfriend he has been off his bipolar medications.  He states when he is off his bipolar medications becomes more depressed.  Initially said it was \"something with my girlfriend\" but initially not tell me why    Social history is unfortunate.  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    REVIEW OF SYSTEMS  General: Positive fever and chills  Eyes: No eye discharge. No eye pain.  Ear nose throat: Positive sore throat  Pulmonary: No shortness of breath or cough.  Cardiovascular: No chest pain or chest pressure.  GI: No abdominal pain nausea or vomiting.  : No dysuria or hematuria  Dermatologic: No rashes. No abrasions.  Neurologic: Positive headache no weakness or numbness  Psychiatric: See above    All other systems are negative      PAST MEDICAL HISTORY  Past Medical History:   Diagnosis Date   • Acne 4/15/2013   • Alcohol ingestion, more than 4 drinks/day 3/4/2016   • Psychiatric disorder     bipolar, schizophrenia, chronic depression   • Warts 1/22/2013       FAMILY HISTORY  Family History   Problem Relation Age of Onset   • Cancer Neg Hx    • Diabetes Neg Hx    • Heart Disease Neg Hx        SOCIAL HISTORY  Social History     Socioeconomic History   • Marital status: Single "     Spouse name: Not on file   • Number of children: 0   • Years of education: Not on file   • Highest education level: Not on file   Occupational History     Employer: OTHER   Tobacco Use   • Smoking status: Never Smoker   • Smokeless tobacco: Never Used   Vaping Use   • Vaping Use: Never used   Substance and Sexual Activity   • Alcohol use: Yes     Comment: drinking every day for last year- 1/5 of whiskey or more    • Drug use: Yes     Types: Inhaled     Comment: marijuana, Meth   • Sexual activity: Not on file   Other Topics Concern   • Not on file   Social History Narrative    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     Social Determinants of Health     Financial Resource Strain:    • Difficulty of Paying Living Expenses: Not on file   Food Insecurity:    • Worried About Running Out of Food in the Last Year: Not on file   • Ran Out of Food in the Last Year: Not on file   Transportation Needs:    • Lack of Transportation (Medical): Not on file   • Lack of Transportation (Non-Medical): Not on file   Physical Activity:    • Days of Exercise per Week: Not on file   • Minutes of Exercise per Session: Not on file   Stress:    • Feeling of Stress : Not on file   Social Connections:    • Frequency of Communication with Friends and Family: Not on file   • Frequency of Social Gatherings with Friends and Family: Not on file   • Attends Jainism Services: Not on file   • Active Member of Clubs or Organizations: Not on file   • Attends Club or Organization Meetings: Not on file   • Marital Status: Not on file   Intimate Partner Violence:    • Fear of Current or Ex-Partner: Not on file   • Emotionally Abused: Not on file   • Physically Abused: Not on file   • Sexually Abused: Not on file   Housing Stability:    • Unable to Pay for Housing in the Last Year: Not on file   • Number of Places Lived in the Last Year: Not on file   • Unstable Housing in the Last Year: Not on file       SURGICAL HISTORY  Past Surgical  "History:   Procedure Laterality Date   • CLAVICLE ORIF Left        CURRENT MEDICATIONS  Home Medications    **Home medications have not yet been reviewed for this encounter**          ALLERGIES  No Known Allergies    PHYSICAL EXAM  VITAL SIGNS: /73   Pulse (!) 106   Temp 36.1 °C (97 °F) (Temporal)   Resp 18   Ht 1.753 m (5' 9\")   Wt 80.3 kg (177 lb 0.5 oz)   SpO2 100%   BMI 26.14 kg/m²   Constitutional: Well developed, Well nourished, No acute distress, Non-toxic appearance.  Diaphoretic.  HENT: Normocephalic, Atraumatic, Bilateral external ears normal, Oropharynx moist, positive exudates in the throat.  Eyes: PERRLA, EOMI, Conjunctiva normal, No discharge.   Musculoskeletal: Neck has normal range of motion, No tenderness, Supple.   Lymphatic: Positive cervical lymphadenopathy cervical lymphadenopathy noted.   Cardiovascular: Normal heart rate, Normal rhythm, No murmurs, No rubs, No gallops.   Thorax & Lungs: Normal breath sounds, No respiratory distress, No wheezing, No chest tenderness.   Abdomen: Nondistended nontender  Skin: Warm, Dry, No erythema, No rash.   : No CVA tenderness.   Psychiatric: Slightly flat affect.  Neurologic: Alert & oriented, moves all extremities equally    RADIOLOGY/PROCEDURES  Results for orders placed or performed during the hospital encounter of 11/26/21   CBC w/ Differential   Result Value Ref Range    WBC 10.7 4.8 - 10.8 K/uL    RBC 4.61 (L) 4.70 - 6.10 M/uL    Hemoglobin 14.1 14.0 - 18.0 g/dL    Hematocrit 41.6 (L) 42.0 - 52.0 %    MCV 90.2 81.4 - 97.8 fL    MCH 30.6 27.0 - 33.0 pg    MCHC 33.9 33.7 - 35.3 g/dL    RDW 39.7 35.9 - 50.0 fL    Platelet Count 160 (L) 164 - 446 K/uL    MPV 9.8 9.0 - 12.9 fL    Neutrophils-Polys 84.10 (H) 44.00 - 72.00 %    Lymphocytes 6.80 (L) 22.00 - 41.00 %    Monocytes 8.10 0.00 - 13.40 %    Eosinophils 0.00 0.00 - 6.90 %    Basophils 0.30 0.00 - 1.80 %    Immature Granulocytes 0.70 0.00 - 0.90 %    Nucleated RBC 0.00 /100 WBC    " Neutrophils (Absolute) 8.97 (H) 1.82 - 7.42 K/uL    Lymphs (Absolute) 0.72 (L) 1.00 - 4.80 K/uL    Monos (Absolute) 0.86 (H) 0.00 - 0.85 K/uL    Eos (Absolute) 0.00 0.00 - 0.51 K/uL    Baso (Absolute) 0.03 0.00 - 0.12 K/uL    Immature Granulocytes (abs) 0.07 0.00 - 0.11 K/uL    NRBC (Absolute) 0.00 K/uL   Complete Metabolic Panel (CMP)   Result Value Ref Range    Sodium 133 (L) 135 - 145 mmol/L    Potassium 3.6 3.6 - 5.5 mmol/L    Chloride 98 96 - 112 mmol/L    Co2 20 20 - 33 mmol/L    Anion Gap 15.0 7.0 - 16.0    Glucose 151 (H) 65 - 99 mg/dL    Bun 8 8 - 22 mg/dL    Creatinine 1.00 0.50 - 1.40 mg/dL    Calcium 9.7 8.4 - 10.2 mg/dL    AST(SGOT) 29 12 - 45 U/L    ALT(SGPT) 25 2 - 50 U/L    Alkaline Phosphatase 98 30 - 99 U/L    Total Bilirubin 0.3 0.1 - 1.5 mg/dL    Albumin 3.8 3.2 - 4.9 g/dL    Total Protein 6.6 6.0 - 8.2 g/dL    Globulin 2.8 1.9 - 3.5 g/dL    A-G Ratio 1.4 g/dL   Group A Strep by PCR    Specimen: Throat   Result Value Ref Range    Group A Strep by PCR DETECTED (A) Not Detected   ESTIMATED GFR   Result Value Ref Range    GFR If African American >60 >60 mL/min/1.73 m 2    GFR If Non African American >60 >60 mL/min/1.73 m 2        COURSE & MEDICAL DECISION MAKING  Pertinent Labs & Imaging studies reviewed. (See chart for details)  31-year-old gentleman who is bipolar here for suicidal seen yesterday has legitimate disorder with pharyngitis I suspect it could be strep or viral Covid test 2 days ago was negative    At this point we will go ahead and give him some IV fluids Toradol will also start him on his medications.  He does tell me is on Prozac and he is on trazodone but he cannot member his bipolar medication as he Klonopin is on there I think that will help him feel better we will reevaluate his suicidal ideation afterwards.    I cannot see in the chart history of suicidal attempt and the patient is obviously ill and the patient obviously is his medication to help we can stabilize him and  reevaluate.    5:17 PM    Patient denies medication is no sleeping and restful.  He is reevaluated for feeling suicidal and now he states he is not sure.  This point he does have a disease he has strep throat be treated with antibiotics and steroids.  He is back on his medication.  Like his mood is stabilized before reevaluating him also get a diagnostic alcohol.  At this point the patient is 31 years old he is here multiple times he is obviously ill and has been diagnosed with Covid and now with strep throat city suicidal but has no specific plans and at this point I find no evidence of him being in the hospital of being suicidal.  We will reassess him.      FINAL IMPRESSION  1.  Strep throat  2.  Mood disorder NOS  3.      Electronically signed by: Farshad Arthur M.D., 11/26/2021 3:04 PM

## 2021-11-27 NOTE — ED NOTES
"Pt. Reports sensation of \"bad acid\". Dr. Cadena aware, orders for Pepcid (see-emar); no further orders at this time.   "

## 2021-11-27 NOTE — ED NOTES
Spoke with Anai at [facility] to arrange transport to [facility]. Margaret ETA [id number]. Authorization # [id number]

## 2021-11-27 NOTE — ED NOTES
Pt has been provided with meal. PT declines any other needs at this time. PT resting in view of sitter at this time.
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
## 2021-11-27 NOTE — ED NOTES
SATNAM at bedside for transfer to [facility]. Pt with no signs of distress, GCS is 15, ambulated with steady gait.

## 2021-11-27 NOTE — ED NOTES
Sitter bedside. Pt to be medicated per MAR. Pt requesting to eat, awaiting on ERP to get diet order.

## 2021-11-27 NOTE — ED NOTES
Danielle from Lab called with critical result of + Strep A at 1617. Critical lab result read back to Danielle Arthur  notified of critical lab result at 1617. Critical lab result read back by Dr. Arthur

## 2021-11-27 NOTE — ED PROVIDER NOTES
"ED Provider Note    Patient:Abdoul Medina  Patient : 1990  Patient MRN: [id number]  Patient PCP: No primary care provider on file.    Admit Date: 2021  Discharge Date and Time: 21 6:26 PM  Discharge Diagnosis: Suicidal ideation, strep throat  Discharge Attending: Randa Cadena M.D.  Disposition Service: ED Observation    ED Course  Abdoul is a 31 y.o. male who was evaluated at [facility] for body aches, sore throat, and suicidal ideation.  Septic work-up is unremarkable the patient is indeed positive for strep throat.  Started on Rocephin IV here in the ED.  He is complaining of body aches as well but is not septic, this is consistent with the infectious process noted.  No indication for inpatient medical management.  In addition the patient endorses suicidal ideation and tells me he has a plan to take his own life.  This is apparently exacerbated by relationship problems.  I counseled behavioral health who concur he is a candidate for legal hold.  Medically cleared, appropriate vaccination for the strep throat, awaiting transfer to inpatient psych.    Discharge Exam:  /73   Pulse (!) 106   Temp 36.1 °C (97 °F) (Temporal)   Resp 18   Ht 1.753 m (5' 9\")   Wt 80.3 kg (177 lb 0.5 oz)   SpO2 100%   BMI 26.14 kg/m² .    Constitutional: Awake and alert.  Appears mildly comfortable but nontoxic  HENT: Pharyngeal erythema with peritonsillar exudate, uvula midline.  Eyes: Grossly normal  Neck: Normal range of motion  Cardiovascular: Mildly tachycardic, otherwise regular rate and rhythm  Thorax & Lungs: No respiratory distress  Skin:  No pathologic rash.   Extremities: Well perfused  Psychiatric: Flat and detached affect.  Endorses suicidal ideation.  Not responding to internal stimuli.    Labs  Results for orders placed or performed during the hospital encounter of 21   CBC w/ Differential   Result Value Ref Range    WBC 10.7 4.8 - 10.8 K/uL    RBC 4.61 (L) " 4.70 - 6.10 M/uL    Hemoglobin 14.1 14.0 - 18.0 g/dL    Hematocrit 41.6 (L) 42.0 - 52.0 %    MCV 90.2 81.4 - 97.8 fL    MCH 30.6 27.0 - 33.0 pg    MCHC 33.9 33.7 - 35.3 g/dL    RDW 39.7 35.9 - 50.0 fL    Platelet Count 160 (L) 164 - 446 K/uL    MPV 9.8 9.0 - 12.9 fL    Neutrophils-Polys 84.10 (H) 44.00 - 72.00 %    Lymphocytes 6.80 (L) 22.00 - 41.00 %    Monocytes 8.10 0.00 - 13.40 %    Eosinophils 0.00 0.00 - 6.90 %    Basophils 0.30 0.00 - 1.80 %    Immature Granulocytes 0.70 0.00 - 0.90 %    Nucleated RBC 0.00 /100 WBC    Neutrophils (Absolute) 8.97 (H) 1.82 - 7.42 K/uL    Lymphs (Absolute) 0.72 (L) 1.00 - 4.80 K/uL    Monos (Absolute) 0.86 (H) 0.00 - 0.85 K/uL    Eos (Absolute) 0.00 0.00 - 0.51 K/uL    Baso (Absolute) 0.03 0.00 - 0.12 K/uL    Immature Granulocytes (abs) 0.07 0.00 - 0.11 K/uL    NRBC (Absolute) 0.00 K/uL   Complete Metabolic Panel (CMP)   Result Value Ref Range    Sodium 133 (L) 135 - 145 mmol/L    Potassium 3.6 3.6 - 5.5 mmol/L    Chloride 98 96 - 112 mmol/L    Co2 20 20 - 33 mmol/L    Anion Gap 15.0 7.0 - 16.0    Glucose 151 (H) 65 - 99 mg/dL    Bun 8 8 - 22 mg/dL    Creatinine 1.00 0.50 - 1.40 mg/dL    Calcium 9.7 8.4 - 10.2 mg/dL    AST(SGOT) 29 12 - 45 U/L    ALT(SGPT) 25 2 - 50 U/L    Alkaline Phosphatase 98 30 - 99 U/L    Total Bilirubin 0.3 0.1 - 1.5 mg/dL    Albumin 3.8 3.2 - 4.9 g/dL    Total Protein 6.6 6.0 - 8.2 g/dL    Globulin 2.8 1.9 - 3.5 g/dL    A-G Ratio 1.4 g/dL   Group A Strep by PCR    Specimen: Throat   Result Value Ref Range    Group A Strep by PCR DETECTED (A) Not Detected   ESTIMATED GFR   Result Value Ref Range    GFR If African American >60 >60 mL/min/1.73 m 2    GFR If Non African American >60 >60 mL/min/1.73 m 2   ETHYL ALCOHOL (BLOOD)   Result Value Ref Range    Diagnostic Alcohol <10.1 0.0 - 10.0 mg/dL   URINE DRUG SCREEN   Result Value Ref Range    Amphetamines Urine Negative Negative    Barbiturates Negative Negative    Benzodiazepines Negative Negative    Cocaine  Metabolite Negative Negative    Methadone Negative Negative    Opiates Negative Negative    Oxycodone Negative Negative    Phencyclidine -Pcp Negative Negative    Propoxyphene Negative Negative    Cannabinoid Metab Negative Negative       Radiology  No orders to display       Disposition: Placed on legal hold after evaluation by behavioral health given active suicidality with hallucinations.  Plan is for transfer to appropriate inpatient psychiatric facility.  Medically cleared, stable.  Time of disposition is 2036, November 26, 2021      Transfer Condition: Srtable    Electronically signed by: Randa Cadena M.D., 11/26/2021 6:26 PM

## 2021-11-27 NOTE — ED NOTES
"Med rec complete per historical med rec on 11/25/2021.  Pt not participatory in interview at this time. Called pharmacies on file, [facility] on [address] ([phone]) and [facility] on [address] ([phone]). Per [facility] on [address], no prescriptions on file for this Pt. Per [facility] on [address], Pt's most [facility] [address] ([phone]), however Pt has not received medications from [facility] since July 2021 at which time Pt was dispensed 30 day supplies of the following medications:  · Prozac 20 mg 1 capsule every day  · Zyprexa 10 mg 1 tablet every night at bedtime  · Trazodone 50 mg 1 tablet every night at bedtime as needed for sleep  · Mirtazapine 15 mg 1 tablet every night at bedtime  Per historical med rec on 11/25/2021, Pt had reported not taking Prozac, trazodone, or Zyprexa for over 3-4 months, and had reported taking only Tylenol over-the-counter.    Addendum: Spoke with Pt at bedside. Verified with Pt that he has not used Prozac, Zyprexa, trazodone, or mirtazapine for \"several months.\" Over-the-counter medication usage reviewed and med rec updated.  "

## 2021-12-03 ENCOUNTER — HOSPITAL ENCOUNTER (EMERGENCY)
Facility: MEDICAL CENTER | Age: 31
End: 2021-12-03
Attending: EMERGENCY MEDICINE
Payer: MEDICAID

## 2021-12-03 ENCOUNTER — APPOINTMENT (OUTPATIENT)
Dept: RADIOLOGY | Facility: MEDICAL CENTER | Age: 31
End: 2021-12-03
Attending: EMERGENCY MEDICINE
Payer: MEDICAID

## 2021-12-03 VITALS
OXYGEN SATURATION: 95 % | TEMPERATURE: 98.5 F | RESPIRATION RATE: 18 BRPM | BODY MASS INDEX: 26.22 KG/M2 | DIASTOLIC BLOOD PRESSURE: 84 MMHG | SYSTOLIC BLOOD PRESSURE: 139 MMHG | HEART RATE: 113 BPM | WEIGHT: 177.03 LBS | HEIGHT: 69 IN

## 2021-12-03 DIAGNOSIS — S02.2XXA CLOSED FRACTURE OF NASAL BONE, INITIAL ENCOUNTER: ICD-10-CM

## 2021-12-03 DIAGNOSIS — F10.929 ALCOHOLIC INTOXICATION WITH COMPLICATION (HCC): ICD-10-CM

## 2021-12-03 PROCEDURE — 96372 THER/PROPH/DIAG INJ SC/IM: CPT

## 2021-12-03 PROCEDURE — 700102 HCHG RX REV CODE 250 W/ 637 OVERRIDE(OP): Performed by: EMERGENCY MEDICINE

## 2021-12-03 PROCEDURE — 99284 EMERGENCY DEPT VISIT MOD MDM: CPT

## 2021-12-03 PROCEDURE — 700111 HCHG RX REV CODE 636 W/ 250 OVERRIDE (IP): Performed by: EMERGENCY MEDICINE

## 2021-12-03 PROCEDURE — 70486 CT MAXILLOFACIAL W/O DYE: CPT

## 2021-12-03 PROCEDURE — 70450 CT HEAD/BRAIN W/O DYE: CPT

## 2021-12-03 PROCEDURE — A9270 NON-COVERED ITEM OR SERVICE: HCPCS | Performed by: EMERGENCY MEDICINE

## 2021-12-03 RX ORDER — HYDROCODONE BITARTRATE AND ACETAMINOPHEN 5; 325 MG/1; MG/1
1 TABLET ORAL ONCE
Status: COMPLETED | OUTPATIENT
Start: 2021-12-03 | End: 2021-12-03

## 2021-12-03 RX ORDER — KETOROLAC TROMETHAMINE 30 MG/ML
15 INJECTION, SOLUTION INTRAMUSCULAR; INTRAVENOUS ONCE
Status: COMPLETED | OUTPATIENT
Start: 2021-12-03 | End: 2021-12-03

## 2021-12-03 RX ADMIN — KETOROLAC TROMETHAMINE 15 MG: 30 INJECTION, SOLUTION INTRAMUSCULAR; INTRAVENOUS at 19:59

## 2021-12-03 RX ADMIN — HYDROCODONE BITARTRATE AND ACETAMINOPHEN 1 TABLET: 5; 325 TABLET ORAL at 18:43

## 2021-12-03 ASSESSMENT — FIBROSIS 4 INDEX: FIB4 SCORE: 1.16
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
## 2021-12-04 NOTE — DISCHARGE INSTRUCTIONS
Patient is medically cleared for incarceration  You can take Tylenol and ibuprofen for the pain.  Please follow-up with ENT if the cosmetic results are not ideal within 7 days.

## 2022-07-18 ENCOUNTER — HOSPITAL ENCOUNTER (EMERGENCY)
Facility: MEDICAL CENTER | Age: 32
End: 2022-07-19
Attending: EMERGENCY MEDICINE
Payer: MEDICAID

## 2022-07-18 DIAGNOSIS — F15.10 METHAMPHETAMINE ABUSE (HCC): ICD-10-CM

## 2022-07-18 DIAGNOSIS — Z78.9 ALCOHOL USE: ICD-10-CM

## 2022-07-18 PROCEDURE — 99284 EMERGENCY DEPT VISIT MOD MDM: CPT

## 2022-07-18 PROCEDURE — 36415 COLL VENOUS BLD VENIPUNCTURE: CPT

## 2022-07-19 ENCOUNTER — APPOINTMENT (OUTPATIENT)
Dept: RADIOLOGY | Facility: MEDICAL CENTER | Age: 32
End: 2022-07-19
Attending: EMERGENCY MEDICINE
Payer: MEDICAID

## 2022-07-19 ENCOUNTER — HOSPITAL ENCOUNTER (EMERGENCY)
Facility: MEDICAL CENTER | Age: 32
End: 2022-07-20
Attending: EMERGENCY MEDICINE
Payer: MEDICAID

## 2022-07-19 VITALS
DIASTOLIC BLOOD PRESSURE: 82 MMHG | TEMPERATURE: 97.3 F | SYSTOLIC BLOOD PRESSURE: 144 MMHG | WEIGHT: 180 LBS | HEART RATE: 94 BPM | HEIGHT: 69 IN | RESPIRATION RATE: 17 BRPM | OXYGEN SATURATION: 95 % | BODY MASS INDEX: 26.66 KG/M2

## 2022-07-19 DIAGNOSIS — R45.851 SUICIDAL IDEATION: ICD-10-CM

## 2022-07-19 DIAGNOSIS — F15.10 METHAMPHETAMINE ABUSE (HCC): ICD-10-CM

## 2022-07-19 DIAGNOSIS — F20.89 OTHER SCHIZOPHRENIA (HCC): ICD-10-CM

## 2022-07-19 DIAGNOSIS — F31.9 BIPOLAR 1 DISORDER (HCC): ICD-10-CM

## 2022-07-19 LAB
ALBUMIN SERPL BCP-MCNC: 4.4 G/DL (ref 3.2–4.9)
ALBUMIN/GLOB SERPL: 1.8 G/DL
ALP SERPL-CCNC: 125 U/L (ref 30–99)
ALT SERPL-CCNC: 32 U/L (ref 2–50)
ANION GAP SERPL CALC-SCNC: 13 MMOL/L (ref 7–16)
ANION GAP SERPL CALC-SCNC: 18 MMOL/L (ref 7–16)
AST SERPL-CCNC: 43 U/L (ref 12–45)
BASOPHILS # BLD AUTO: 0.6 % (ref 0–1.8)
BASOPHILS # BLD: 0.05 K/UL (ref 0–0.12)
BILIRUB SERPL-MCNC: 0.3 MG/DL (ref 0.1–1.5)
BUN SERPL-MCNC: 12 MG/DL (ref 8–22)
BUN SERPL-MCNC: 9 MG/DL (ref 8–22)
CALCIUM SERPL-MCNC: 9.1 MG/DL (ref 8.4–10.2)
CALCIUM SERPL-MCNC: 9.2 MG/DL (ref 8.4–10.2)
CHLORIDE SERPL-SCNC: 102 MMOL/L (ref 96–112)
CHLORIDE SERPL-SCNC: 103 MMOL/L (ref 96–112)
CO2 SERPL-SCNC: 18 MMOL/L (ref 20–33)
CO2 SERPL-SCNC: 21 MMOL/L (ref 20–33)
CREAT SERPL-MCNC: 0.94 MG/DL (ref 0.5–1.4)
CREAT SERPL-MCNC: 0.97 MG/DL (ref 0.5–1.4)
EKG IMPRESSION: NORMAL
EKG IMPRESSION: NORMAL
EOSINOPHIL # BLD AUTO: 0.07 K/UL (ref 0–0.51)
EOSINOPHIL NFR BLD: 0.9 % (ref 0–6.9)
ERYTHROCYTE [DISTWIDTH] IN BLOOD BY AUTOMATED COUNT: 40.3 FL (ref 35.9–50)
ETHANOL BLD-MCNC: 105.9 MG/DL
ETHANOL BLD-MCNC: <10.1 MG/DL
GFR SERPLBLD CREATININE-BSD FMLA CKD-EPI: 105 ML/MIN/1.73 M 2
GFR SERPLBLD CREATININE-BSD FMLA CKD-EPI: 110 ML/MIN/1.73 M 2
GLOBULIN SER CALC-MCNC: 2.5 G/DL (ref 1.9–3.5)
GLUCOSE SERPL-MCNC: 97 MG/DL (ref 65–99)
GLUCOSE SERPL-MCNC: 98 MG/DL (ref 65–99)
HCT VFR BLD AUTO: 42.5 % (ref 42–52)
HGB BLD-MCNC: 14.9 G/DL (ref 14–18)
IMM GRANULOCYTES # BLD AUTO: 0.02 K/UL (ref 0–0.11)
IMM GRANULOCYTES NFR BLD AUTO: 0.3 % (ref 0–0.9)
LYMPHOCYTES # BLD AUTO: 2.82 K/UL (ref 1–4.8)
LYMPHOCYTES NFR BLD: 35.3 % (ref 22–41)
MCH RBC QN AUTO: 30 PG (ref 27–33)
MCHC RBC AUTO-ENTMCNC: 35.1 G/DL (ref 33.7–35.3)
MCV RBC AUTO: 85.7 FL (ref 81.4–97.8)
MONOCYTES # BLD AUTO: 0.86 K/UL (ref 0–0.85)
MONOCYTES NFR BLD AUTO: 10.8 % (ref 0–13.4)
NEUTROPHILS # BLD AUTO: 4.17 K/UL (ref 1.82–7.42)
NEUTROPHILS NFR BLD: 52.1 % (ref 44–72)
NRBC # BLD AUTO: 0 K/UL
NRBC BLD-RTO: 0 /100 WBC
PLATELET # BLD AUTO: 247 K/UL (ref 164–446)
PMV BLD AUTO: 9.6 FL (ref 9–12.9)
POC BREATHALIZER: 0 PERCENT (ref 0–0.01)
POTASSIUM SERPL-SCNC: 3.6 MMOL/L (ref 3.6–5.5)
POTASSIUM SERPL-SCNC: 3.7 MMOL/L (ref 3.6–5.5)
PROT SERPL-MCNC: 6.9 G/DL (ref 6–8.2)
RBC # BLD AUTO: 4.96 M/UL (ref 4.7–6.1)
SODIUM SERPL-SCNC: 137 MMOL/L (ref 135–145)
SODIUM SERPL-SCNC: 138 MMOL/L (ref 135–145)
TROPONIN T SERPL-MCNC: 13 NG/L (ref 6–19)
TROPONIN T SERPL-MCNC: 15 NG/L (ref 6–19)
WBC # BLD AUTO: 8 K/UL (ref 4.8–10.8)

## 2022-07-19 PROCEDURE — 36415 COLL VENOUS BLD VENIPUNCTURE: CPT

## 2022-07-19 PROCEDURE — 99285 EMERGENCY DEPT VISIT HI MDM: CPT

## 2022-07-19 PROCEDURE — 71045 X-RAY EXAM CHEST 1 VIEW: CPT

## 2022-07-19 PROCEDURE — 80053 COMPREHEN METABOLIC PANEL: CPT

## 2022-07-19 PROCEDURE — 302970 POC BREATHALIZER: Performed by: EMERGENCY MEDICINE

## 2022-07-19 PROCEDURE — 85025 COMPLETE CBC W/AUTO DIFF WBC: CPT

## 2022-07-19 PROCEDURE — 93005 ELECTROCARDIOGRAM TRACING: CPT | Performed by: EMERGENCY MEDICINE

## 2022-07-19 PROCEDURE — 82077 ASSAY SPEC XCP UR&BREATH IA: CPT

## 2022-07-19 PROCEDURE — A9270 NON-COVERED ITEM OR SERVICE: HCPCS | Performed by: EMERGENCY MEDICINE

## 2022-07-19 PROCEDURE — 82077 ASSAY SPEC XCP UR&BREATH IA: CPT | Mod: 91

## 2022-07-19 PROCEDURE — 84484 ASSAY OF TROPONIN QUANT: CPT | Mod: 91

## 2022-07-19 PROCEDURE — 84484 ASSAY OF TROPONIN QUANT: CPT

## 2022-07-19 PROCEDURE — 700102 HCHG RX REV CODE 250 W/ 637 OVERRIDE(OP): Performed by: EMERGENCY MEDICINE

## 2022-07-19 PROCEDURE — 80048 BASIC METABOLIC PNL TOTAL CA: CPT

## 2022-07-19 RX ORDER — LORAZEPAM 1 MG/1
1 TABLET ORAL ONCE
Status: COMPLETED | OUTPATIENT
Start: 2022-07-19 | End: 2022-07-19

## 2022-07-19 RX ORDER — LORAZEPAM 1 MG/1
2 TABLET ORAL ONCE
Status: DISCONTINUED | OUTPATIENT
Start: 2022-07-19 | End: 2022-07-19

## 2022-07-19 RX ORDER — TRAZODONE HYDROCHLORIDE 50 MG/1
100 TABLET ORAL NIGHTLY
Status: DISCONTINUED | OUTPATIENT
Start: 2022-07-19 | End: 2022-07-20 | Stop reason: HOSPADM

## 2022-07-19 RX ORDER — CHLORDIAZEPOXIDE HYDROCHLORIDE 25 MG/1
25 CAPSULE, GELATIN COATED ORAL EVERY 6 HOURS PRN
Status: DISCONTINUED | OUTPATIENT
Start: 2022-07-19 | End: 2022-07-20 | Stop reason: HOSPADM

## 2022-07-19 RX ORDER — OLANZAPINE 10 MG/1
10 TABLET ORAL NIGHTLY
COMMUNITY

## 2022-07-19 RX ORDER — TRAZODONE HYDROCHLORIDE 100 MG/1
100 TABLET ORAL NIGHTLY
COMMUNITY

## 2022-07-19 RX ORDER — OLANZAPINE 5 MG/1
10 TABLET, ORALLY DISINTEGRATING ORAL EVERY EVENING
Status: DISCONTINUED | OUTPATIENT
Start: 2022-07-19 | End: 2022-07-20 | Stop reason: HOSPADM

## 2022-07-19 RX ADMIN — LORAZEPAM 1 MG: 1 TABLET ORAL at 15:15

## 2022-07-19 RX ADMIN — OLANZAPINE 10 MG: 5 TABLET, ORALLY DISINTEGRATING ORAL at 17:55

## 2022-07-19 RX ADMIN — TRAZODONE HYDROCHLORIDE 100 MG: 50 TABLET ORAL at 21:16

## 2022-07-19 RX ADMIN — LIDOCAINE HYDROCHLORIDE 30 ML: 20 SOLUTION OROPHARYNGEAL at 00:24

## 2022-07-19 ASSESSMENT — FIBROSIS 4 INDEX
FIB4 SCORE: 1.02
FIB4 SCORE: 1.2

## 2022-07-19 NOTE — DISCHARGE PLANNING
[facility] states they do not have any beds at this time. Their recommendation is that pt call tomorrow to check bed availability.
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
## 2022-07-19 NOTE — ED TRIAGE NOTES
Pt to ed by nick, pt d/c from er at 3 am  Here today requesting help with meth / etoh abuse  Pt states he is now SI , wants to jump off building
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
## 2022-07-19 NOTE — ED NOTES
Patient's home medications have been reviewed by the pharmacy team.     Past Medical History:   Diagnosis Date   • Acne 4/15/2013   • Alcohol ingestion, more than 4 drinks/day 3/4/2016   • Psychiatric disorder     bipolar, schizophrenia, chronic depression   • Warts 1/22/2013       Patient's Medications   New Prescriptions    No medications on file   Previous Medications    OLANZAPINE (ZYPREXA) 10 MG TABLET    Take 10 mg by mouth every evening.    TRAZODONE (DESYREL) 100 MG TAB    Take 100 mg by mouth every evening.   Modified Medications    No medications on file   Discontinued Medications    ACETAMINOPHEN (TYLENOL) 325 MG TAB    Take 975 Tablets by mouth every 6 hours as needed for Mild Pain or Fever. 3 tablets = 975 mg.    IBUPROFEN (MOTRIN) 200 MG TAB    Take 600 mg by mouth every 6 hours as needed for Mild Pain or Fever. 3 tablets = 600 mg.      A: Medications do not appear to be contributing to current complaints.     P:  Home medications have been reordered as appropriate.    Maricarmen Kee, PharmD, BCPS

## 2022-07-19 NOTE — ED NOTES
Ok to discharge home and go to [facility] later in the morning or tomorrow.
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
## 2022-07-19 NOTE — ED NOTES
DC instructions reviewed. Verbalizes understanding and denies further need. Ambulated to exit with a steady gait. Pt to wait in lobby for cab to arrive and take pt home. Pt instructed multiple times to rpt to [facility] in am per MD instructions.

## 2022-07-20 VITALS
DIASTOLIC BLOOD PRESSURE: 93 MMHG | BODY MASS INDEX: 26.45 KG/M2 | HEIGHT: 69 IN | HEART RATE: 74 BPM | WEIGHT: 178.57 LBS | RESPIRATION RATE: 16 BRPM | OXYGEN SATURATION: 100 % | SYSTOLIC BLOOD PRESSURE: 148 MMHG | TEMPERATURE: 97 F

## 2022-07-20 NOTE — ED NOTES
Referral packet has been faxed to [facility], [facility], and [facility].

## 2022-07-20 NOTE — DISCHARGE PLANNING
note:  Received a call from RN TYSON Scott stating that pt [facility] called regarding referral.     TYSON talked to Brandy at Alert Team and she said that pt does not meet criteria for inpatient psych admission but she will follow up with behavioral health RN.    English

## 2022-07-20 NOTE — ED PROVIDER NOTES
"ED Provider Note    Patient:Abdoul Medina  Patient : 1990  Patient MRN: [id number]  Patient PCP: No primary care provider on file.    Admit Date: 2022  Discharge Date and Time: 22 10:23 AM  Discharge Diagnosis: polysubstance abuse with situational depression  Discharge Attending: Robby Rodriguez M.D.  Discharge Service: ED Observation    ED Course  Abdoul is a 32 y.o. male who was evaluated at [facility] for request of polysubstance detox as well as suicidal ideations. Throughout his observation course emergency room he has remained stable. Now sober. Denying any persistent suicidal or homicidal ideations. Continues to desire detox. I have had direct conversation with the patient this morning after signout and have had additional benefit of alert team evaluation this morning also. At this point the patient will be discharged from his legal hold and from the ER and he will be transferred directly to [facility] for further polysubstance detox/cessation care.    Discharge Exam:  BP (!) 148/93   Pulse 74   Temp 36.1 °C (97 °F) (Temporal)   Resp 16   Ht 1.753 m (5' 9\")   Wt 81 kg (178 lb 9.2 oz)   SpO2 100%   BMI 26.37 kg/m² .    Constitutional: Awake and alert. Nontoxic  HENT:  Grossly normal  Eyes: Grossly normal  Neck: Normal range of motion  Cardiovascular: Normal heart rate   Thorax & Lungs: No respiratory distress  Abdomen: Nontender  Skin:  No pathologic rash.   Extremities: Well perfused  Psychiatric: Affect normal    Labs  Results for orders placed or performed during the hospital encounter of 22   TROPONIN   Result Value Ref Range    Troponin T 13 6 - 19 ng/L   Basic Metabolic Panel   Result Value Ref Range    Sodium 137 135 - 145 mmol/L    Potassium 3.7 3.6 - 5.5 mmol/L    Chloride 103 96 - 112 mmol/L    Co2 21 20 - 33 mmol/L    Glucose 98 65 - 99 mg/dL    Bun 12 8 - 22 mg/dL    Creatinine 0.97 0.50 - 1.40 mg/dL    Calcium 9.1 8.4 - 10.2 mg/dL    Anion Gap 13.0 7.0 - 16.0   ETHYL ALCOHOL " (BLOOD)   Result Value Ref Range    Diagnostic Alcohol <10.1 <10.1 mg/dL   ESTIMATED GFR   Result Value Ref Range    GFR (CKD-EPI) 105 >60 mL/min/1.73 m 2   POC BREATHALIZER   Result Value Ref Range    POC Breathalizer 0.00 0.00 - 0.01 Percent   EKG (NOW)   Result Value Ref Range    Report       [facility] Emergency Dept.    Test Date:  2022  Pt Name:    PRECIOUS DE LUNA                  Department: [facility]  MRN:        [id number]                      [facility]  Gender:     Male                         Technician: [id number]  :        1990                   Requested By:ROBBY HARDY  Order #:    [id number]                    Reading MD: ROBBY HARDY. [facility]    Measurements  Intervals                                Axis  Rate:       80                           P:          65  WA:         148                          QRS:        69  QRSD:       89                           T:          46  QT:         395  QTc:        456    Interpretive Statements  Sinus rhythm  Compared to ECG 2022 00:11:09  No significant changes  Electronically Signed On 2022 10:20:22 PDT by ROBBY HARDY. [facility]         Radiology  DX-CHEST-PORTABLE (1 VIEW)   Final Result      No evidence of acute cardiopulmonary process.            Medications:   Discharge Medication List as of 2022  2:13 PM          Discharge Condition: Stable    Electronically signed by: Robby Rodriguez M.D., 2022 10:23 AM

## 2022-07-20 NOTE — DISCHARGE PLANNING
Pt now denies suicidal ideation and is able to contract for safety. He is willing to go to [facility], as beds are now available. Peer to come facilitate transfer.

## 2022-07-20 NOTE — ED NOTES
Report to ROEL Little. Pt. Has been cooperative and pleasant with staff during this shift. Has eaten 100% of all 3 meals plus a few snacks today. Sitter remains outside room for continuous 1:1 observation.

## 2022-07-20 NOTE — DISCHARGE PLANNING
[facility] peer  met with pt to review resources. Pt provided with [facility] rehab program application. Peer spoke to [facility] and was informed that pt has been there 17 times in the last 18 months and they recommend a different level of care. Peer confirmed that pt can receive services  at [facility] tomorrow pending bed availability.

## 2022-07-20 NOTE — DISCHARGE PLANNING
Declined by [facility]. Pt has belongings at [facility] that he will need to .

## 2022-07-20 NOTE — ED NOTES
Security called to search belongings. Pt resting in no distress at this time in view of 1:1 sitter.

## 2022-07-20 NOTE — ED NOTES
Discharge instructions given and discussed. Belongings give. Pt will be transported to [facility] via the peer center transportation.

## 2022-07-30 ENCOUNTER — HOSPITAL ENCOUNTER (EMERGENCY)
Facility: MEDICAL CENTER | Age: 32
End: 2022-07-31
Attending: EMERGENCY MEDICINE
Payer: MEDICAID

## 2022-07-30 DIAGNOSIS — R45.851 SUICIDAL IDEATION: ICD-10-CM

## 2022-07-30 LAB
AMPHET UR QL SCN: POSITIVE
BARBITURATES UR QL SCN: NEGATIVE
BENZODIAZ UR QL SCN: NEGATIVE
BZE UR QL SCN: NEGATIVE
CANNABINOIDS UR QL SCN: NEGATIVE
EKG IMPRESSION: NORMAL
EKG IMPRESSION: NORMAL
METHADONE UR QL SCN: NEGATIVE
OPIATES UR QL SCN: NEGATIVE
OXYCODONE UR QL SCN: NEGATIVE
PCP UR QL SCN: NEGATIVE
POC BREATHALIZER: 0.08 PERCENT (ref 0–0.01)
POC BREATHALIZER: 0.12 PERCENT (ref 0–0.01)
PROPOXYPH UR QL SCN: NEGATIVE

## 2022-07-30 PROCEDURE — 80307 DRUG TEST PRSMV CHEM ANLYZR: CPT

## 2022-07-30 PROCEDURE — 93005 ELECTROCARDIOGRAM TRACING: CPT | Performed by: EMERGENCY MEDICINE

## 2022-07-30 PROCEDURE — 700105 HCHG RX REV CODE 258: Performed by: EMERGENCY MEDICINE

## 2022-07-30 PROCEDURE — 302970 POC BREATHALIZER: Performed by: EMERGENCY MEDICINE

## 2022-07-30 PROCEDURE — 90791 PSYCH DIAGNOSTIC EVALUATION: CPT

## 2022-07-30 PROCEDURE — 99285 EMERGENCY DEPT VISIT HI MDM: CPT

## 2022-07-30 PROCEDURE — A9270 NON-COVERED ITEM OR SERVICE: HCPCS | Performed by: EMERGENCY MEDICINE

## 2022-07-30 PROCEDURE — 700102 HCHG RX REV CODE 250 W/ 637 OVERRIDE(OP): Performed by: EMERGENCY MEDICINE

## 2022-07-30 RX ORDER — SODIUM CHLORIDE, SODIUM LACTATE, POTASSIUM CHLORIDE, CALCIUM CHLORIDE 600; 310; 30; 20 MG/100ML; MG/100ML; MG/100ML; MG/100ML
1000 INJECTION, SOLUTION INTRAVENOUS ONCE
Status: COMPLETED | OUTPATIENT
Start: 2022-07-30 | End: 2022-07-30

## 2022-07-30 RX ORDER — ACETAMINOPHEN 325 MG/1
650 TABLET ORAL EVERY 6 HOURS PRN
Status: DISCONTINUED | OUTPATIENT
Start: 2022-07-30 | End: 2022-07-31 | Stop reason: HOSPADM

## 2022-07-30 RX ADMIN — SODIUM CHLORIDE, POTASSIUM CHLORIDE, SODIUM LACTATE AND CALCIUM CHLORIDE 1000 ML: 600; 310; 30; 20 INJECTION, SOLUTION INTRAVENOUS at 19:05

## 2022-07-30 RX ADMIN — ACETAMINOPHEN 650 MG: 325 TABLET, FILM COATED ORAL at 21:04

## 2022-07-30 ASSESSMENT — FIBROSIS 4 INDEX: FIB4 SCORE: 1.02

## 2022-07-30 ASSESSMENT — PAIN SCALES - WONG BAKER: WONGBAKER_NUMERICALRESPONSE: HURTS AS MUCH AS POSSIBLE
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
## 2022-07-30 NOTE — ED TRIAGE NOTES
"Chief Complaint   Patient presents with   • Suicidal Ideation     Pt arrives tearful and stating he is suicidal. States he has been trying to \"drink myself to death\" and \"overdose on fentanyl and meth.\" Also states \"I don't normally do the fentanyl.\" Pt. States \"I last did all of this just before I came in here. I need to go to rehab.\"  "

## 2022-07-31 VITALS
SYSTOLIC BLOOD PRESSURE: 143 MMHG | HEIGHT: 69 IN | RESPIRATION RATE: 16 BRPM | TEMPERATURE: 98.1 F | BODY MASS INDEX: 28.14 KG/M2 | OXYGEN SATURATION: 99 % | DIASTOLIC BLOOD PRESSURE: 83 MMHG | WEIGHT: 190 LBS | HEART RATE: 89 BPM

## 2022-07-31 RX ORDER — OLANZAPINE 5 MG/1
5 TABLET ORAL NIGHTLY
Status: DISCONTINUED | OUTPATIENT
Start: 2022-07-31 | End: 2022-07-31 | Stop reason: HOSPADM

## 2022-07-31 RX ORDER — TRAZODONE HYDROCHLORIDE 50 MG/1
100 TABLET ORAL NIGHTLY
Status: DISCONTINUED | OUTPATIENT
Start: 2022-07-31 | End: 2022-07-31 | Stop reason: HOSPADM

## 2022-07-31 NOTE — ED NOTES
Pt transferred to [facility] with [facility] staff and all personal belongings. Pt ambulatory out of the ED with a steady gait.

## 2022-07-31 NOTE — DISCHARGE SUMMARY
"  ED Observation Discharge Summary    Patient:Abdoul Medina  Patient : 1990  Patient MRN: [id number]  Patient PCP: No primary care provider on file.    Admit Date: 2022  Discharge Date and Time: 22 6:33 AM  Discharge Diagnosis: Suicidal ideation  Discharge Attending: Minnie Crystal D.O.  Discharge Service: ED Observation    ED Course  Patient reevaluated.  Patient is on a legal hold, waiting transfer to psychiatric facility when a bed is available.  Please refer to initial note for complete details.  No concerns overnight.  Patient ambulates to the bathroom independently and tolerated a meal.  Medication reconciliation has been reviewed.    \6:34 AM patient has been accepted to [place] [facility]  Planned transport at 8 AM.    Discharge Exam:  BP (!) 151/97   Pulse 70   Temp 36 °C (96.8 °F) (Temporal)   Resp 14   Ht 1.753 m (5' 9\")   Wt 86.2 kg (190 lb)   SpO2 98%   BMI 28.06 kg/m² .    Constitutional: Awake and alert. Nontoxic  HENT:  Grossly normal  Eyes: Grossly normal  Neck: Normal range of motion  Cardiovascular: Normal peripheral perfusion  Thorax & Lungs: Nonlabored respirations  Skin: Warm and dry  Extremities: No deformities noted  Psychiatric: Flat affect.  Cooperative.    Labs  Results for orders placed or performed during the hospital encounter of 22   Urine Drug Screen   Result Value Ref Range    Amphetamines Urine Positive (A) Negative    Barbiturates Negative Negative    Benzodiazepines Negative Negative    Cocaine Metabolite Negative Negative    Methadone Negative Negative    Opiates Negative Negative    Oxycodone Negative Negative    Phencyclidine -Pcp Negative Negative    Propoxyphene Negative Negative    Cannabinoid Metab Negative Negative   POC BREATHALIZER   Result Value Ref Range    POC Breathalizer 0.117 (A) 0.00 - 0.01 Percent   POC Breathalizer   Result Value Ref Range    POC Breathalizer 0.077 (A) 0.00 - 0.01 Percent   EKG   Result Value Ref Range    " Report       [facility] Emergency Dept.    Test Date:  2022  Pt Name:    [facility]                  Department: ER  MRN:        [id number]                      Room:       [facility]  Gender:     Male                         Technician: [id number]  :        1990                   Requested By:NICOLE ESPINOZA  Order #:    [id number]                    Reading MD: NICOLE ESPINOZA MD    Measurements  Intervals                                Axis  Rate:       98                           P:          60  NM:         138                          QRS:        41  QRSD:       92                           T:          27  QT:         349  QTc:        446    Interpretive Statements  Sinus rhythm  Compared to ECG 2022 09:16:55  No significant changes  Electronically Signed On 2022 19:49:55 PDT by NICOLE ESPINOZA MD     EKG   Result Value Ref Range    Report       [facility] Emergency Dept.    Test Date:  2022  Pt Name:    [facility]                  Department: ER  MRN:        [id number]                      Room:       [facility]  Gender:     Male                         Technician: [id number]  :        1990                   Requested By:NICOLE ESPINOZA  Order #:    [id number]                    Reading MD: NICOLE ESPINOZA MD    Measurements  Intervals                                Axis  Rate:       100                          P:          68  NM:         143                          QRS:        57  QRSD:       87                           T:          50  QT:         349  QTc:        451    Interpretive Statements  Sinus tachycardia  Compared to ECG 2022 17:41:21  Sinus rhythm no longer present  Electronically Signed On 2022 21:33:31 PDT by NICOLE ESPNIOZA MD         Radiology  No orders to display         Medications:   New Prescriptions    No medications on file       Discharge Condition: Stable    Electronically signed by: Minnie Crystal D.O.,  7/31/2022 6:33 AM

## 2022-07-31 NOTE — ED NOTES
Patient's home medications have been reviewed by the pharmacy team.     Past Medical History:   Diagnosis Date   • Acne 4/15/2013   • Alcohol ingestion, more than 4 drinks/day 3/4/2016   • Psychiatric disorder     bipolar, schizophrenia, chronic depression   • Warts 1/22/2013     Patient's Medications   New Prescriptions    No medications on file   Previous Medications    OLANZAPINE (ZYPREXA) 10 MG TABLET    Take 10 mg by mouth every evening.    TRAZODONE (DESYREL) 100 MG TAB    Take 100 mg by mouth every evening.   Modified Medications    No medications on file   Discontinued Medications    No medications on file     A:  Medication noncompliance could be contributing to current complaints.     P:    No recommendations at this time. Home medications have been reordered as appropriate.    Suhail Guadalupe, CaitD, BCPS

## 2022-07-31 NOTE — DISCHARGE PLANNING
[facility] ED Behavioral Health Fax Referral      [facility] ED Behavioral Health Alert Team:  [phone]    Referral: Legal Hold    Intervention: Patient referral to [facility] inpatient  facillity    Legal Hold Initiated: Date: 7/30/2022 Time: 1640    Patient’s Insurance Listed on Face Sheet: [place] [employer]    [facility], [facility]     Referrals faxed by: Lo Lauren RN    This referral contains the following information:  1) Face sheet __x__  2) Page 1 and Page 2 of Legal Hold __x__  3) Alert Team Assessment/Psych Assessment __x__  4) Head to toe physical exam __x__  5) Urine Drug Screen __x__  6) Blood Alcohol __x__  7) Vital signs __x__  8) Pregnancy test when applicable ___  9) Medications list __x__  10) Covid screening ____    Plan: Patient will transfer to mental health facility once acceptance is obtained

## 2022-07-31 NOTE — DISCHARGE PLANNING
Alert Team/Behavioral Health   Note:    Mental Health Transfer     Referral: transfer to Mental Health Facility     Intervention: day shift Alert Team RN (Traci WALLACE) notified writer of acceptance.     Pt's accepting provider is [facility].     Transport arranged through [facility].     The pt will be picked up at 0815.     Transfer packet created with original legal hold and placed in chart by night shift Alert Team RN (Lo SANDERS).     Plan: transfer to [facility] via [facility] for inpatient acute psychiatric care.

## 2022-07-31 NOTE — CONSULTS
"[facility]   TRIAGE ASSESSMENT    Name: Abdoul Medina  MRN: [id number]  : 1990  Age: 32 y.o.  Date of assessment: 2022  PCP: No primary care provider on file.   Persons in attendance: Patient  Patient Location: [facility]    CHIEF COMPLAINT/PRESENTING ISSUE (as stated by patient): Pt is a 31 y/o male presenting to ED reporting SI w/ plan to overdose on ETOH/meth/fentanyl. Denies HI/hallucinations. Hx of chronic SI and multiple suicide attempts. Hx of bipolar d/o, depression, and polysubstance abuse. Poor follow-up w/ outpatient psychiatric services; non compliance with psychiatric medication regimen. Hx of multiple inpatient psychiatric hospitalizations. Daily ETOH use; MASHA on arrival was 0.117; MASHA 0.077 at 1910. Reports daily meth use and recently started using fentanyl; UDS collection pending. Findings discussed with ERP who agrees pt needs to transfer to an inpatient psychiatric facility for further evaluation and stabilization. [place] medicaid insurance plan. Inpatient psychiatric referrals faxed to [facility], [facility], [facility]. Reviewed community resources with pt.   Chief Complaint   Patient presents with   • Suicidal Ideation        CURRENT LIVING SITUATION/SOCIAL SUPPORT/FINANCIAL RESOURCES: Lives with a roommate. He is unemployed and his mother helps him out financially. He is in mental health court.     BEHAVIORAL HEALTH/SUBSTANCE USE TREATMENT HISTORY  Does patient/parent report a history of prior behavioral health/substance use treatment for patient?   Yes:    Dates Level of Care Facilty/Provider Diagnosis/Problem Medications    , multiple admissions, pt unable to provide dates inpatient [facility] MH/CD Zyprexa, trazodone and \"mood stabilizers\"   2021 x2 inpatient [place] [facility]/CD      inpatient [facility] detox     2017-present outpatient [facility]     2017-present outpatient Dr Andino Med " management                                                                         SAFETY ASSESSMENT - SELF  Does patient acknowledge current or past symptoms of dangerousness to self or is previous history noted? yes  Does parent/significant other report patient has current or past symptoms of dangerousness to self? N\A  Does presenting problem suggest symptoms of dangerousness to self? Yes:     Past Current    Suicidal Thoughts: [x]  [x]    Suicidal Plans: [x]  [x]    Suicidal Intent: [x]  []    Suicide Attempts: [x]  []    Self-Injury [x]  []      For any boxes checked above, provide detail: Pt reporting SI w/ plan to overdose on ETOH/meth/fentanyl. Reports hx of SI; hx of SA.    History of suicide by family member: no  History of suicide by friend/significant other: no  Recent change in frequency/specificity/intensity of suicidal thoughts or self-harm behavior? No-chronic   Current access to firearms, medications, or other identified means of suicide/self-harm? yes - pt has access to means of SI  If yes, willing to restrict access to means of suicide/self-harm? yes - placed on legal hold and belongings secured; awaiting transfer to inpatient psychiatric facility  Protective factors present:  Actively engaged in treatment and Willing to address in treatment    SAFETY ASSESSMENT - OTHERS  Does patient acknowledge current or past symptoms of aggressive behavior or risk to others or is previous history noted? no  Does parent/significant other report patient has current or past symptoms of aggressive behavior or risk to others?  N\A  Does presenting problem suggest symptoms of dangerousness to others? No; denies HI.    LEGAL HISTORY  Does patient acknowledge history of arrest/senior living/FCI or is previous history noted? Yes; pt is in mental health court for a burglary charge.     Crisis Safety Plan completed and copy given to patient? N\A    ABUSE/NEGLECT SCREENING  Does patient report feeling “unsafe” in his/her home,  "or afraid of anyone?  no  Does patient report any history of physical, sexual, or emotional abuse?  no  Does parent or significant other report any of the above? N\A  Is there evidence of neglect by self?  no  Is there evidence of neglect by a caregiver? N/A  Does the patient/parent report any history of CPS/APS/police involvement related to suspected abuse/neglect or domestic violence? no  Based on the information provided during the current assessment, is a mandated report of suspected abuse/neglect being made?  No    SUBSTANCE USE SCREENING  Yes:  [place] all substances used in the past 30 days:      Last Use Amount   [x]   Alcohol 07/30/2022 \"a handle\" daily   []   Marijuana     []   Heroin     [x]   Prescription Opioids  (used without prescription, for    recreation, or in excess of prescribed amount) : fentanyl Not specified Not specified   []   Other Prescription  (used without prescription, for    recreation, or in excess of prescribed amount)     []   Cocaine      [x]   Methamphetamine 07/30/2022 Not specified   []   \"\" drugs (ectasy, MDMA)     []   Other substances        UDS results: collection pending   Breathalyzer results: 0.117 @ 1701; 0.077 @ 1910    What consequences does the patient associate with any of the above substance use and or addictive behaviors? Family problems, Health problems, Monetary problems    Risk factors for detox (check all that apply):  []  Seizures   [x]  Diaphoretic (sweating)   [x]  Tremors   [x]  Hallucinations   [x]  Increased blood pressure   []  Decreased blood pressure   []  Other   []  None      [x] Patient education on risk factors for detoxification and instructed to return to ER as needed.      MENTAL STATUS   Participation: Active verbal participation, Attentive, Engaged and Open to feedback  Grooming: Casual  Orientation: Alert and Fully Oriented  Behavior: Tense  Eye contact: Limited  Mood: Depressed and Anxious  Affect: Blunted, Sad and Anxious  Thought " process: Logical and Goal-directed  Thought content: Within normal limits  Speech: Pressured  Perception: Within normal limits  Memory:  Poor memory for chronology of events  Insight: Poor  Judgment:  Poor  Other:    Collateral information:   Source:  [] Significant other present in person:   [] Significant other by telephone  [] [facility]   [x] [facility] Nursing Staff  [x] [facility] Medical Record  [x] Other: ERP    [] Unable to complete full assessment due to:  [] Acute intoxication  [] Patient declined to participate/engage  [] Patient verbally unresponsive  [] Significant cognitive deficits  [] Significant perceptual distortions or behavioral disorganization  [x] Other: N/A     CLINICAL IMPRESSIONS:  Primary:  Suicidal Ideation  Secondary:  Polysubstance abuse      IDENTIFIED NEEDS/PLAN:  [Trigger DISPOSITION list for any items marked]    [x]  Imminent safety risk - self [] Imminent safety risk - others   []  Acute substance withdrawal []  Psychosis/Impaired reality testing   [x]  Mood/anxiety [x]  Substance use/Addictive behavior   [x]  Maladaptive behaviro []  Parent/child conflict   []  Family/Couples conflict []  Biomedical   []  Housing []  Financial   []   Legal  Occupational/Educational   []  Domestic violence []  Other:     Recommended Plan of Care:  Actively being addressed by [facility] and [facility], Refer to inpatient psychiatric referral and 1:1 Observation. Pt reporting SI w/ plan to overdose on ETOH/meth/fentanyl. Denies HI/hallucinations. Findings discussed with ERP who agrees pt needs to transfer to an inpatient psychiatric facility for further evaluation and stabilization. [place] medicaid insurance plan. Inpatient psychiatric referrals faxed to [facility], [facility], [facility] . Reviewed community resources with pt.   *Telesitter may not be utilized for moderate or high risk patients    Has the Recommended Plan of Care/Level of Observation  been reviewed with the patient's assigned nurse? Yes; high risk on Dorado scale; 1:1 sitter required.     Does patient/parent or guardian express agreement with the above plan? yes    Referral appointment(s) scheduled? N\A    Alert team only:   I have discussed findings and recommendations with Dr. Jin who is in agreement with these recommendations.     Referral information sent to the following outpatient community providers : [facility]    Referral information sent to the following inpatient [facility] providers : [facility], [facility], [facility]    If applicable : Referred  to  Alert Team for legal hold follow up at (time): 7/30/2022      Lo Lauren R.N.  7/30/2022

## 2022-07-31 NOTE — ED NOTES
Med rec completed per patient at bedside.    Patient denies any abx in the last 30 days.    Patient has NKDA.    Home pharmacy is [facility].

## 2022-07-31 NOTE — DISCHARGE PLANNING
Alert Team:    Pt has been accepted by Dr. Andino at [facility]; requesting 0800 for transport. Updated RN & ERP.

## 2023-07-16 ENCOUNTER — HOSPITAL ENCOUNTER (EMERGENCY)
Facility: MEDICAL CENTER | Age: 33
End: 2023-07-16
Attending: EMERGENCY MEDICINE
Payer: MEDICAID

## 2023-07-16 VITALS
SYSTOLIC BLOOD PRESSURE: 141 MMHG | HEART RATE: 103 BPM | DIASTOLIC BLOOD PRESSURE: 98 MMHG | TEMPERATURE: 97.3 F | HEIGHT: 69 IN | BODY MASS INDEX: 27.4 KG/M2 | RESPIRATION RATE: 20 BRPM | WEIGHT: 185 LBS | OXYGEN SATURATION: 98 %

## 2023-07-16 DIAGNOSIS — T14.91XA SUICIDE ATTEMPT (HCC): ICD-10-CM

## 2023-07-16 DIAGNOSIS — F10.10 ALCOHOL ABUSE: ICD-10-CM

## 2023-07-16 DIAGNOSIS — F15.10 METHAMPHETAMINE ABUSE (HCC): ICD-10-CM

## 2023-07-16 DIAGNOSIS — R45.850 HOMICIDAL IDEATIONS: ICD-10-CM

## 2023-07-16 DIAGNOSIS — F32.3 CURRENT SEVERE EPISODE OF MAJOR DEPRESSIVE DISORDER WITH PSYCHOTIC FEATURES WITHOUT PRIOR EPISODE (HCC): ICD-10-CM

## 2023-07-16 LAB
ALBUMIN SERPL BCP-MCNC: 4.4 G/DL (ref 3.2–4.9)
ALBUMIN/GLOB SERPL: 1.8 G/DL
ALP SERPL-CCNC: 126 U/L (ref 30–99)
ALT SERPL-CCNC: 24 U/L (ref 2–50)
AMPHET UR QL SCN: POSITIVE
ANION GAP SERPL CALC-SCNC: 15 MMOL/L (ref 7–16)
AST SERPL-CCNC: 59 U/L (ref 12–45)
BARBITURATES UR QL SCN: NEGATIVE
BASOPHILS # BLD AUTO: 0.5 % (ref 0–1.8)
BASOPHILS # BLD: 0.03 K/UL (ref 0–0.12)
BENZODIAZ UR QL SCN: NEGATIVE
BILIRUB SERPL-MCNC: 0.4 MG/DL (ref 0.1–1.5)
BUN SERPL-MCNC: 11 MG/DL (ref 8–22)
BZE UR QL SCN: NEGATIVE
CALCIUM ALBUM COR SERPL-MCNC: 8.4 MG/DL (ref 8.5–10.5)
CALCIUM SERPL-MCNC: 8.7 MG/DL (ref 8.5–10.5)
CANNABINOIDS UR QL SCN: POSITIVE
CHLORIDE SERPL-SCNC: 104 MMOL/L (ref 96–112)
CO2 SERPL-SCNC: 20 MMOL/L (ref 20–33)
CREAT SERPL-MCNC: 0.97 MG/DL (ref 0.5–1.4)
EKG IMPRESSION: NORMAL
EKG IMPRESSION: NORMAL
EOSINOPHIL # BLD AUTO: 0.03 K/UL (ref 0–0.51)
EOSINOPHIL NFR BLD: 0.5 % (ref 0–6.9)
ERYTHROCYTE [DISTWIDTH] IN BLOOD BY AUTOMATED COUNT: 42.4 FL (ref 35.9–50)
ETHANOL BLD-MCNC: 107 MG/DL
FENTANYL UR QL: POSITIVE
GFR SERPLBLD CREATININE-BSD FMLA CKD-EPI: 105 ML/MIN/1.73 M 2
GLOBULIN SER CALC-MCNC: 2.5 G/DL (ref 1.9–3.5)
GLUCOSE SERPL-MCNC: 110 MG/DL (ref 65–99)
HCT VFR BLD AUTO: 40 % (ref 42–52)
HGB BLD-MCNC: 13.6 G/DL (ref 14–18)
IMM GRANULOCYTES # BLD AUTO: 0.02 K/UL (ref 0–0.11)
IMM GRANULOCYTES NFR BLD AUTO: 0.3 % (ref 0–0.9)
LYMPHOCYTES # BLD AUTO: 1.33 K/UL (ref 1–4.8)
LYMPHOCYTES NFR BLD: 21.4 % (ref 22–41)
MCH RBC QN AUTO: 29.8 PG (ref 27–33)
MCHC RBC AUTO-ENTMCNC: 34 G/DL (ref 32.3–36.5)
MCV RBC AUTO: 87.5 FL (ref 81.4–97.8)
METHADONE UR QL SCN: NEGATIVE
MONOCYTES # BLD AUTO: 0.55 K/UL (ref 0–0.85)
MONOCYTES NFR BLD AUTO: 8.9 % (ref 0–13.4)
NEUTROPHILS # BLD AUTO: 4.25 K/UL (ref 1.82–7.42)
NEUTROPHILS NFR BLD: 68.4 % (ref 44–72)
NRBC # BLD AUTO: 0 K/UL
NRBC BLD-RTO: 0 /100 WBC (ref 0–0.2)
OPIATES UR QL SCN: NEGATIVE
OXYCODONE UR QL SCN: NEGATIVE
PCP UR QL SCN: NEGATIVE
PLATELET # BLD AUTO: 252 K/UL (ref 164–446)
PMV BLD AUTO: 9.6 FL (ref 9–12.9)
POC BREATHALIZER: 0.08 PERCENT (ref 0–0.01)
POTASSIUM SERPL-SCNC: 3.6 MMOL/L (ref 3.6–5.5)
PROPOXYPH UR QL SCN: NEGATIVE
PROT SERPL-MCNC: 6.9 G/DL (ref 6–8.2)
RBC # BLD AUTO: 4.57 M/UL (ref 4.7–6.1)
SODIUM SERPL-SCNC: 139 MMOL/L (ref 135–145)
WBC # BLD AUTO: 6.2 K/UL (ref 4.8–10.8)

## 2023-07-16 PROCEDURE — 302970 POC BREATHALIZER: Performed by: EMERGENCY MEDICINE

## 2023-07-16 PROCEDURE — 90832 PSYTX W PT 30 MINUTES: CPT | Performed by: SOCIAL WORKER

## 2023-07-16 PROCEDURE — 85025 COMPLETE CBC W/AUTO DIFF WBC: CPT

## 2023-07-16 PROCEDURE — 80053 COMPREHEN METABOLIC PANEL: CPT

## 2023-07-16 PROCEDURE — 82077 ASSAY SPEC XCP UR&BREATH IA: CPT

## 2023-07-16 PROCEDURE — 36415 COLL VENOUS BLD VENIPUNCTURE: CPT

## 2023-07-16 PROCEDURE — 93005 ELECTROCARDIOGRAM TRACING: CPT | Performed by: EMERGENCY MEDICINE

## 2023-07-16 PROCEDURE — A9270 NON-COVERED ITEM OR SERVICE: HCPCS | Mod: UD | Performed by: EMERGENCY MEDICINE

## 2023-07-16 PROCEDURE — 700102 HCHG RX REV CODE 250 W/ 637 OVERRIDE(OP): Mod: UD | Performed by: EMERGENCY MEDICINE

## 2023-07-16 PROCEDURE — 90791 PSYCH DIAGNOSTIC EVALUATION: CPT

## 2023-07-16 PROCEDURE — 99285 EMERGENCY DEPT VISIT HI MDM: CPT

## 2023-07-16 PROCEDURE — 80307 DRUG TEST PRSMV CHEM ANLYZR: CPT

## 2023-07-16 PROCEDURE — 93005 ELECTROCARDIOGRAM TRACING: CPT

## 2023-07-16 RX ORDER — CHLORDIAZEPOXIDE HYDROCHLORIDE 25 MG/1
25 CAPSULE, GELATIN COATED ORAL ONCE
Status: COMPLETED | OUTPATIENT
Start: 2023-07-16 | End: 2023-07-16

## 2023-07-16 RX ADMIN — CHLORDIAZEPOXIDE HYDROCHLORIDE 25 MG: 25 CAPSULE ORAL at 13:55

## 2023-07-16 ASSESSMENT — FIBROSIS 4 INDEX: FIB4 SCORE: 1.5

## 2023-07-16 NOTE — DISCHARGE PLANNING
Alert Team/Behavioral Health   Note:        - [facility] ([facility]): Talked to Intake Counselor (Veda). No beds currently available. Referral is on pending list. [facility] will call back if they have any discharges. As of now, no discharges are scheduled for today.    - [facility] : Talked to Charge RN (Marek). No admissions today due to short staffing.    - [facility] : Talked to Krissy. No beds currently available.

## 2023-07-16 NOTE — ED NOTES
Discussed discharge options for patient with peer support. Social work contacted to get voucher for patient to go to [facility] outreach.
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
## 2023-07-16 NOTE — DISCHARGE PLANNING
[facility] ED Behavioral Health Fax Referral      [facility] ED Behavioral Health Alert Team:  [phone]    Referral: Legal Hold    Intervention: Patient referral to Critical access hospital inpatient  facillity    Legal Hold Initiated: Date: 07/16/23 Time: 0335    Patient’s Insurance Listed on Face Sheet: [place] Medicaid    Referrals sent to: [facility], [facility], [facility]    Referrals faxed by Boom Weinberg RN, DEENA    This referral contains the following information:  Face sheet __x__  Page 1 and Page 2 of Legal Hold __x__  Alert Team Assessment/Psych Assessment __x__  Head to toe physical exam __x__  Urine Drug Screen x____  Blood Alcohol _x___  Vital signs __x__  Pregnancy test when applicable ___  Medications list ____  Covid screening ____    Plan: Patient will transfer to mental health facility once acceptance is obtained

## 2023-07-16 NOTE — DISCHARGE SUMMARY
"  ED Observation Discharge Summary    Patient:Abdoul Medina  Patient : 1990  Patient MRN: [id number]  Patient PCP: Pcp Pt States None    Admit Date: 2023  Discharge Date and Time: 23 11:27 AM  Discharge Diagnosis: Substance abuse, depression  Discharge Attending: Felicia Taylor M.D.  Discharge Service: ED Observation    ED Course  Abdoul is a 33 y.o. male who was evaluated at [facility] for evaluation of substance abuse and suicidal ideation.  He was placed on a legal hold and was pending inpatient psychiatric placement.  This morning he was evaluated by psychiatrist Dr. Pendleton and he is no longer actively suicidal.  Patient's biggest complaint is wanting to go to rehab and get assistance with his substance abuse.  As he is forward thinking and has plans for the future and wants to get help with his substance abuse his legal hold was cleared by psychiatry.  He is offered outpatient mental health resources and outpatient substance abuse resources.  He is then discharged in stable condition.    Discharge Exam:  /86   Pulse 75   Temp 37.3 °C (99.1 °F) (Temporal)   Resp 18   Ht 1.753 m (5' 9\")   Wt 83.9 kg (185 lb)   SpO2 98%   BMI 27.32 kg/m² .    Constitutional: Awake and alert. Nontoxic  HENT:  Grossly normal  Eyes: Grossly normal  Neck: Normal range of motion  Cardiovascular: Normal heart rate   Thorax & Lungs: No respiratory distress  Abdomen: Nontender  Skin:  No pathologic rash.   Extremities: Well perfused  Psychiatric: Affect normal    Labs  Results for orders placed or performed during the hospital encounter of 23   Urine Drug Screen   Result Value Ref Range    Amphetamines Urine Positive (A) Negative    Barbiturates Negative Negative    Benzodiazepines Negative Negative    Cocaine Metabolite Negative Negative    Fentanyl, Urine Positive (A) Negative    Methadone Negative Negative    Opiates Negative Negative    Oxycodone Negative Negative    " Phencyclidine -Pcp Negative Negative    Propoxyphene Negative Negative    Cannabinoid Metab Positive (A) Negative   CBC WITH DIFFERENTIAL   Result Value Ref Range    WBC 6.2 4.8 - 10.8 K/uL    RBC 4.57 (L) 4.70 - 6.10 M/uL    Hemoglobin 13.6 (L) 14.0 - 18.0 g/dL    Hematocrit 40.0 (L) 42.0 - 52.0 %    MCV 87.5 81.4 - 97.8 fL    MCH 29.8 27.0 - 33.0 pg    MCHC 34.0 32.3 - 36.5 g/dL    RDW 42.4 35.9 - 50.0 fL    Platelet Count 252 164 - 446 K/uL    MPV 9.6 9.0 - 12.9 fL    Neutrophils-Polys 68.40 44.00 - 72.00 %    Lymphocytes 21.40 (L) 22.00 - 41.00 %    Monocytes 8.90 0.00 - 13.40 %    Eosinophils 0.50 0.00 - 6.90 %    Basophils 0.50 0.00 - 1.80 %    Immature Granulocytes 0.30 0.00 - 0.90 %    Nucleated RBC 0.00 0.00 - 0.20 /100 WBC    Neutrophils (Absolute) 4.25 1.82 - 7.42 K/uL    Lymphs (Absolute) 1.33 1.00 - 4.80 K/uL    Monos (Absolute) 0.55 0.00 - 0.85 K/uL    Eos (Absolute) 0.03 0.00 - 0.51 K/uL    Baso (Absolute) 0.03 0.00 - 0.12 K/uL    Immature Granulocytes (abs) 0.02 0.00 - 0.11 K/uL    NRBC (Absolute) 0.00 K/uL   COMP METABOLIC PANEL   Result Value Ref Range    Sodium 139 135 - 145 mmol/L    Potassium 3.6 3.6 - 5.5 mmol/L    Chloride 104 96 - 112 mmol/L    Co2 20 20 - 33 mmol/L    Anion Gap 15.0 7.0 - 16.0    Glucose 110 (H) 65 - 99 mg/dL    Bun 11 8 - 22 mg/dL    Creatinine 0.97 0.50 - 1.40 mg/dL    Calcium 8.7 8.5 - 10.5 mg/dL    AST(SGOT) 59 (H) 12 - 45 U/L    ALT(SGPT) 24 2 - 50 U/L    Alkaline Phosphatase 126 (H) 30 - 99 U/L    Total Bilirubin 0.4 0.1 - 1.5 mg/dL    Albumin 4.4 3.2 - 4.9 g/dL    Total Protein 6.9 6.0 - 8.2 g/dL    Globulin 2.5 1.9 - 3.5 g/dL    A-G Ratio 1.8 g/dL   DIAGNOSTIC ALCOHOL   Result Value Ref Range    Diagnostic Alcohol 107.0 (H) <10.1 mg/dL   CORRECTED CALCIUM   Result Value Ref Range    Correct Calcium 8.4 (L) 8.5 - 10.5 mg/dL   ESTIMATED GFR   Result Value Ref Range    GFR (CKD-EPI) 105 >60 mL/min/1.73 m 2   POC BREATHALIZER   Result Value Ref Range    POC Breathalizer  0.082 (A) 0.00 - 0.01 Percent   EKG (NOW)   Result Value Ref Range    Report       [facility] Emergency Dept.    Test Date:  2023  Pt Name:    [facility]                  Department: ER  MRN:        [id number]                      Room:  Gender:     Male                         Technician: [id number]  :        1990                   Requested By:ER TRIAGE PROTOCOL  Order #:    [id number]                    Reading MD:    Measurements  Intervals                                Axis  Rate:       85                           P:          58  HI:         130                          QRS:        50  QRSD:       95                           T:          33  QT:         381  QTc:        453    Interpretive Statements  Sinus rhythm  Consider left atrial enlargement  Compared to ECG 2022 20:36:17  Sinus tachycardia no longer present     EKG   Result Value Ref Range    Report       [facility] Emergency Dept.    Test Date:  2023  Pt Name:    [facility]                  Department: ER  MRN:        [id number]                      Room:       [facility]  Gender:     Male                         Technician: [id number]  :        1990                   Requested By:SHANEL JENKINS  Order #:    [id number]                    Reading MD:    Measurements  Intervals                                Axis  Rate:       76                           P:          67  HI:         136                          QRS:        59  QRSD:       97                           T:          48  QT:         402  QTc:        453    Interpretive Statements  Sinus rhythm  Compared to ECG 2023 02:06:45  No significant changes         My final assessment includes substance abuse, depression  Upon Reevaluation, the patient's condition has: Improved; and will be discharged.    Patient discharged from ED Observation status at 11:27 AM on 2023    Total time spent on this ED Observation discharge encounter is <  30 Minutes    Electronically signed by: Felicia Taylor M.D., 7/16/2023 11:27 AM

## 2023-07-16 NOTE — ED NOTES
Patient medicated per MAR. Reports he is feeling anxious. Discharge instructions reviewed with patient. Patient verbalizes understanding of follow up care, medication management, and reasons to return to ER. Outpatient care services and reasons to call 911 and suicide hotline reviewed with patient. [facility] and [facility].

## 2023-07-16 NOTE — ED TRIAGE NOTES
"Chief Complaint   Patient presents with    Suicidal Ideation     States he is suicidal, mixed a concoction of fentanyl, meth, and alcohol. States prior SI hx - 2 days ago by overdosing. States he wants to go back to [facility].     Chest Pain     Appx 2 hrs ago, 10/10 aching, sharp pain. States SOB, denies cardiac hx.        Pt ambulated with steady gait to triage. Pt A&Ox4, on room air. Pt states he drinks about 1 gallon of whiskey/day, last drink 2 hrs ago.     Pt to G35 .     /77   Pulse 95   Temp 35.8 °C (96.5 °F) (Temporal)   Resp (!) 21   Ht 1.753 m (5' 9\")   Wt 83.9 kg (185 lb)   SpO2 96%   BMI 27.32 kg/m²     "

## 2023-07-16 NOTE — CONSULTS
Brief Behavioral Health Care Note:    Length of Intervention: 30 minutes    Received request per Dr. Taylor to provide patient with outpatient substance use treatment resources prior to discharge.  Patient seen lying on hospital bed, sleeping but easy to arouse. Discussed with patient various community based resources available for patient upon discharge. Patient responded with appreciation stating he is interested in treatment and will follow through with contacting the agencies provided. Also patient agreed to  accept a referral to the Peer  who will complete an intake interview and submit referrals on patients behalf for substance use residential treatment programs if available.    Contacted [facility] member on duty who agreed to see patient and begin the referral process. Provided a patient face sheet on [facility] desk per request.    Signing off    Thank you,    Marilee Ashton, Ph.D., [facility]

## 2023-07-16 NOTE — PROGRESS NOTES
"ED Observation Progress Note    Date of Service: 07/16/23    Interval History and Interventions  Patient is a 33-year-old male who was seen on 7/16/2023 for suicidal ideation.  He is on a legal hold pending psychiatric placement.  This morning he has no acute complaints.    Physical Exam  /77   Pulse 95   Temp 35.8 °C (96.5 °F) (Temporal)   Resp (!) 21   Ht 1.753 m (5' 9\")   Wt 83.9 kg (185 lb)   SpO2 96%   BMI 27.32 kg/m² .    Constitutional: Awake and alert. Nontoxic  HENT:  Grossly normal  Eyes: Grossly normal  Neck: Normal range of motion  Cardiovascular: Normal heart rate   Thorax & Lungs: No respiratory distress  Skin:  No pathologic rash.   Extremities: Well perfused  Psychiatric: Affect normal    Labs  Results for orders placed or performed during the hospital encounter of 07/16/23   CBC WITH DIFFERENTIAL   Result Value Ref Range    WBC 6.2 4.8 - 10.8 K/uL    RBC 4.57 (L) 4.70 - 6.10 M/uL    Hemoglobin 13.6 (L) 14.0 - 18.0 g/dL    Hematocrit 40.0 (L) 42.0 - 52.0 %    MCV 87.5 81.4 - 97.8 fL    MCH 29.8 27.0 - 33.0 pg    MCHC 34.0 32.3 - 36.5 g/dL    RDW 42.4 35.9 - 50.0 fL    Platelet Count 252 164 - 446 K/uL    MPV 9.6 9.0 - 12.9 fL    Neutrophils-Polys 68.40 44.00 - 72.00 %    Lymphocytes 21.40 (L) 22.00 - 41.00 %    Monocytes 8.90 0.00 - 13.40 %    Eosinophils 0.50 0.00 - 6.90 %    Basophils 0.50 0.00 - 1.80 %    Immature Granulocytes 0.30 0.00 - 0.90 %    Nucleated RBC 0.00 0.00 - 0.20 /100 WBC    Neutrophils (Absolute) 4.25 1.82 - 7.42 K/uL    Lymphs (Absolute) 1.33 1.00 - 4.80 K/uL    Monos (Absolute) 0.55 0.00 - 0.85 K/uL    Eos (Absolute) 0.03 0.00 - 0.51 K/uL    Baso (Absolute) 0.03 0.00 - 0.12 K/uL    Immature Granulocytes (abs) 0.02 0.00 - 0.11 K/uL    NRBC (Absolute) 0.00 K/uL   COMP METABOLIC PANEL   Result Value Ref Range    Sodium 139 135 - 145 mmol/L    Potassium 3.6 3.6 - 5.5 mmol/L    Chloride 104 96 - 112 mmol/L    Co2 20 20 - 33 mmol/L    Anion Gap 15.0 7.0 - 16.0    Glucose " 110 (H) 65 - 99 mg/dL    Bun 11 8 - 22 mg/dL    Creatinine 0.97 0.50 - 1.40 mg/dL    Calcium 8.7 8.5 - 10.5 mg/dL    AST(SGOT) 59 (H) 12 - 45 U/L    ALT(SGPT) 24 2 - 50 U/L    Alkaline Phosphatase 126 (H) 30 - 99 U/L    Total Bilirubin 0.4 0.1 - 1.5 mg/dL    Albumin 4.4 3.2 - 4.9 g/dL    Total Protein 6.9 6.0 - 8.2 g/dL    Globulin 2.5 1.9 - 3.5 g/dL    A-G Ratio 1.8 g/dL   DIAGNOSTIC ALCOHOL   Result Value Ref Range    Diagnostic Alcohol 107.0 (H) <10.1 mg/dL   CORRECTED CALCIUM   Result Value Ref Range    Correct Calcium 8.4 (L) 8.5 - 10.5 mg/dL   ESTIMATED GFR   Result Value Ref Range    GFR (CKD-EPI) 105 >60 mL/min/1.73 m 2   POC BREATHALIZER   Result Value Ref Range    POC Breathalizer 0.082 (A) 0.00 - 0.01 Percent   EKG (NOW)   Result Value Ref Range    Report       [facility] Emergency Dept[facility]    Test Date:  2023  Pt Name:    [facility]                  Department: ER  MRN:        [id number]                      Room:  Gender:     Male                         Technician: [id number]  :        1990                   Requested By:ER TRIAGE PROTOCOL  Order #:    [id number]                    Reading MD:    Measurements  Intervals                                Axis  Rate:       85                           P:          58  VT:         130                          QRS:        50  QRSD:       95                           T:          33  QT:         381  QTc:        453    Interpretive Statements  Sinus rhythm  Consider left atrial enlargement  Compared to ECG 2022 20:36:17  Sinus tachycardia no longer present     EKG   Result Value Ref Range    Report       [facility] Emergency Dept.    Test Date:  2023  Pt Name:    [facility]                  Department: ER  MRN:        [id number]                      Room:        35  Gender:     Male                         Technician: [id number]  :        1990                   Requested By:SHANEL JENKINS  Order #:     [id number]                    Reading MD:    Measurements  Intervals                                Axis  Rate:       76                           P:          67  IL:         136                          QRS:        59  QRSD:       97                           T:          48  QT:         402  QTc:        453    Interpretive Statements  Sinus rhythm  Compared to ECG 07/16/2023 02:06:45  No significant changes         Radiology  No orders to display       Problem List  1.  Suicidal ideation-pending placement    Electronically signed by: Felicia Taylor M.D., 7/16/2023 5:05 AM

## 2023-07-16 NOTE — ED NOTES
Patient is now refusing to go to [facility] and wants a taxi voucher home. Outpatient care at [facility] discussed with patient. He continues to refuse a ride there and wants to go home. Social work notified.

## 2023-07-16 NOTE — ED PROVIDER NOTES
ED Provider Note    CHIEF COMPLAINT  Chief Complaint   Patient presents with    Suicidal Ideation     States he is suicidal, mixed a concoction of fentanyl, meth, and alcohol. States prior SI hx - 2 days ago by overdosing. States he wants to go back to [facility].     Chest Pain     Appx 2 hrs ago, 10/10 aching, sharp pain. States SOB, denies cardiac hx.        EXTERNAL RECORDS REVIEWED  Care everywhere patient's last admission to [facility] was 7/13/2023 where he was admitted for depression and substance abuse.  Prior to that it was 7/5/2023 then 6/18/2023 then 5/29/2023    HPI/ROS  LIMITATION TO HISTORY   Select: Intoxication  OUTSIDE HISTORIAN(S):  None    Abdoul Medina is a 33 y.o. male who presents tonight stating that he attempted to kill himself by mixing a cocktail of fentanyl, methamphetamines, whiskey.  He states that he is very depressed and has been off of his medications for over a month now.  He normally takes Zyprexa 10 mg daily but has been self-medicating with alcohol and drugs.  He also states that he is fearful that he may hurt someone else because he is having homicidal thoughts as well.    PAST MEDICAL HISTORY   has a past medical history of Acne (4/15/2013), Alcohol ingestion, more than 4 drinks/day (3/4/2016), Psychiatric disorder, and Warts (1/22/2013).    SURGICAL HISTORY   has a past surgical history that includes orif, fracture, clavicle (Left).    FAMILY HISTORY  Family History   Problem Relation Age of Onset    Cancer Neg Hx     Diabetes Neg Hx     Heart Disease Neg Hx        SOCIAL HISTORY  Social History     Tobacco Use    Smoking status: Never    Smokeless tobacco: Never   Vaping Use    Vaping Use: Never used   Substance and Sexual Activity    Alcohol use: Yes     Comment: 1 gallon of whisky about everyday    Drug use: Yes     Types: Inhaled     Comment: marijuana, Meth(smoke only no injection ), fentanyl    Sexual activity: Not on file       CURRENT MEDICATIONS  Home Medications        "Reviewed by Julissa Sheridan R.N. (Registered Nurse) on 07/16/23 at 0210  Med List Status: Partial     Medication Last Dose Status   olanzapine (ZYPREXA) 10 MG tablet  Active   traZODone (DESYREL) 100 MG Tab  Active                    ALLERGIES  No Known Allergies    PHYSICAL EXAM  VITAL SIGNS: /77   Pulse 95   Temp 35.8 °C (96.5 °F) (Temporal)   Resp (!) 21   Ht 1.753 m (5' 9\")   Wt 83.9 kg (185 lb)   SpO2 96%   BMI 27.32 kg/m²    Constitutional: Patient is a disheveled young male, very agitated having suicidal and homicidal thoughts.  He is very paranoid.  HENT: Normocephalic, atraumatic.  No oral facial or dental trauma noted.  Oropharynx dry.  Eyes: PERRL, EOMI, Conjunctiva without erythema   Neck: Supple   Lymphatic: No lymphadenopathy noted.   Cardiovascular: Tachycardic without murmur  Thorax & Lungs: Clear and equal breath sounds with good excursion. No respiratory distress, no rhonchi, wheezing   Abdomen: Bowel sounds normal in all four quadrants. Soft,nontender  Skin: Warm, Dry, multiple contusions and scabbing on his bilateral knees and anterior lower legs.  Back: No cervical, thoracic, or lumbosacral tenderness.    Extremities: Peripheral pulses 4/4  Musculoskeletal: Normal range of motion in all major joints. No tenderness to palpation or major deformities noted.   Neurologic: Alert & oriented x 3, Normal motor function, Normal sensory function, No lateralizing or focal deficits noted. DTR's 4/4 bilaterally.  Psychiatric: Affect odd, judgment is impaired due to drugs and alcohol.      DIAGNOSTIC STUDIES / PROCEDURES  EKG  I have independently interpreted this EKG  Sinus rhythm at a rate of 85 bpm with no acute ST elevation or depression no ventricular ectopy no QT prolongation or A-V dissociation.    LABS  Results for orders placed or performed during the hospital encounter of 07/16/23   CBC WITH DIFFERENTIAL   Result Value Ref Range    WBC 6.2 4.8 - 10.8 K/uL    RBC 4.57 (L) 4.70 - 6.10 M/uL "    Hemoglobin 13.6 (L) 14.0 - 18.0 g/dL    Hematocrit 40.0 (L) 42.0 - 52.0 %    MCV 87.5 81.4 - 97.8 fL    MCH 29.8 27.0 - 33.0 pg    MCHC 34.0 32.3 - 36.5 g/dL    RDW 42.4 35.9 - 50.0 fL    Platelet Count 252 164 - 446 K/uL    MPV 9.6 9.0 - 12.9 fL    Neutrophils-Polys 68.40 44.00 - 72.00 %    Lymphocytes 21.40 (L) 22.00 - 41.00 %    Monocytes 8.90 0.00 - 13.40 %    Eosinophils 0.50 0.00 - 6.90 %    Basophils 0.50 0.00 - 1.80 %    Immature Granulocytes 0.30 0.00 - 0.90 %    Nucleated RBC 0.00 0.00 - 0.20 /100 WBC    Neutrophils (Absolute) 4.25 1.82 - 7.42 K/uL    Lymphs (Absolute) 1.33 1.00 - 4.80 K/uL    Monos (Absolute) 0.55 0.00 - 0.85 K/uL    Eos (Absolute) 0.03 0.00 - 0.51 K/uL    Baso (Absolute) 0.03 0.00 - 0.12 K/uL    Immature Granulocytes (abs) 0.02 0.00 - 0.11 K/uL    NRBC (Absolute) 0.00 K/uL   COMP METABOLIC PANEL   Result Value Ref Range    Sodium 139 135 - 145 mmol/L    Potassium 3.6 3.6 - 5.5 mmol/L    Chloride 104 96 - 112 mmol/L    Co2 20 20 - 33 mmol/L    Anion Gap 15.0 7.0 - 16.0    Glucose 110 (H) 65 - 99 mg/dL    Bun 11 8 - 22 mg/dL    Creatinine 0.97 0.50 - 1.40 mg/dL    Calcium 8.7 8.5 - 10.5 mg/dL    AST(SGOT) 59 (H) 12 - 45 U/L    ALT(SGPT) 24 2 - 50 U/L    Alkaline Phosphatase 126 (H) 30 - 99 U/L    Total Bilirubin 0.4 0.1 - 1.5 mg/dL    Albumin 4.4 3.2 - 4.9 g/dL    Total Protein 6.9 6.0 - 8.2 g/dL    Globulin 2.5 1.9 - 3.5 g/dL    A-G Ratio 1.8 g/dL   DIAGNOSTIC ALCOHOL   Result Value Ref Range    Diagnostic Alcohol 107.0 (H) <10.1 mg/dL   CORRECTED CALCIUM   Result Value Ref Range    Correct Calcium 8.4 (L) 8.5 - 10.5 mg/dL   ESTIMATED GFR   Result Value Ref Range    GFR (CKD-EPI) 105 >60 mL/min/1.73 m 2   POC BREATHALIZER   Result Value Ref Range    POC Breathalizer 0.082 (A) 0.00 - 0.01 Percent   EKG (NOW)   Result Value Ref Range    Report       [facility] Emergency Dept.    Test Date:  2023-07-16  Pt Name:    PRECIOUS DE LUNA                  Department: ER  MRN:         [id number]                      Room:  Gender:     Male                         Technician: [id number]  :        1990                   Requested By:ER TRIAGE PROTOCOL  Order #:    [id number]                    Reading MD:    Measurements  Intervals                                Axis  Rate:       85                           P:          58  TX:         130                          QRS:        50  QRSD:       95                           T:          33  QT:         381  QTc:        453    Interpretive Statements  Sinus rhythm  Consider left atrial enlargement  Compared to ECG 2022 20:36:17  Sinus tachycardia no longer present     EKG   Result Value Ref Range    Report       [facility] Emergency Dept.    Test Date:  2023  Pt Name:    PRECIOUS DE LUNA                  Department: ER  MRN:        [id number]                      Room:       [facility]  Gender:     Male                         Technician: [id number]  :        1990                   Requested By:SHANEL JENKINS  Order #:    [id number]                    Reading MD:    Measurements  Intervals                                Axis  Rate:       76                           P:          67  TX:         136                          QRS:        59  QRSD:       97                           T:          48  QT:         402  QTc:        453    Interpretive Statements  Sinus rhythm  Compared to ECG 2023 02:06:45  No significant changes         COURSE & MEDICAL DECISION MAKING    ED Observation Status? Yes; I am placing the patient in to an observation status due to a diagnostic uncertainty as well as therapeutic intensity. Patient placed in observation status at 02:09AM, 2023.     Observation plan is as follows: Medical work-up for drug and alcohol ingestion then psychiatric evaluation with plans for inpatient treatment    Upon Reevaluation, the patient's condition has: not improved; and will be escalated to hospitalization.    Patient  discharged from ED Observation status when bed becomes available at psychiatric facility    INITIAL ASSESSMENT, COURSE AND PLAN  Care Narrative: Because of patient's historical drug and alcohol overdose he was worked up medically with an EKG, IV fluids and laboratories.  These were all found to be unremarkable.  Blood alcohol level was 0.109.  His labs did not reveal any toxicity.  He was evaluated by behavioral health at this time because of his history he will require hospitalization and referral to inpatient psych.  HYDRATION: Based on the patient's presentation of Other suicide attempt with drugs and alcohol the patient was given IV fluids. IV Hydration was used because oral hydration was not as rapid as required. Upon recheck following hydration, the patient was improved.      ADDITIONAL PROBLEM LIST  Major depression, polysubstance abuse  DISPOSITION AND DISCUSSIONS  I have discussed management of the patient with the following physicians and SARAH's: None    Discussion of management with other [facility] or appropriate source(s): [facility]      Escalation of care considered, and ultimately not performed:acute inpatient care management, however at this time, the patient is most appropriate for outpatient management    Barriers to care at this time, including but not limited to: Patient does not have established PCP.     Decision tools and prescription drugs considered including, but not limited to: Psychiatric evaluation and potential admission..    FINAL DIAGNOSIS  1. Suicide attempt (HCC)    2. Current severe episode of major depressive disorder with psychotic features without prior episode (HCC)    3. Methamphetamine abuse (HCC)    4. Alcohol abuse    5. Homicidal ideations           Electronically signed by: Abi Galdamez D.O., 7/16/2023 4:17 AM

## 2023-07-16 NOTE — CONSULTS
"  Name: Abdoul Medina  MRN: [id number]  : 1990  Age: 33 y.o.  Date of assessment: 2023  PCP: No primary care provider on file.  Persons in attendance: Patient  Patient Location: [facility]    CHIEF COMPLAINT/PRESENTING ISSUE (as stated by pt):   Chief Complaint   Patient presents with    Suicidal Ideation     States he is suicidal, mixed a concoction of fentanyl, meth, and alcohol. States prior SI hx - 2 days ago by overdosing. States he wants to go back to [facility].     Chest Pain     Appx 2 hrs ago, 10/10 aching, sharp pain. States SOB, denies cardiac hx.         CURRENT LIVING SITUATION/SOCIAL SUPPORT/FINANCIAL RESOURCES: Pt is a 31 y/o male presenting to ED reporting that he Chip on ETOH/meth/fentanyl \" a few hours ago.\" Pt presents as A + O x 4, appears under the influence of methamphetamines, somnolent, falling asleep during assessment. Denies HI/hallucinations. Hx of chronic SI and multiple suicide attempts. Hx of bipolar d/o, depression, and polysubstance abuse. Poor follow-up w/ outpatient psychiatric services; non compliance with psychiatric medication regimen. Hx of multiple inpatient psychiatric hospitalizations. Daily ETOH use; MASHA on arrival was 0.08. Reports daily meth use and intermittent fentanyl use; UDS collection pending. Findings discussed with ERP who agrees pt needs to transfer to an inpatient psychiatric facility for further evaluation and stabilization. [place] medicaid insurance plan. Inpatient psychiatric referrals faxed to [facility], [facility], [facility]. Reviewed community resources with pt.     CURRENT LIVING SITUATION/SOCIAL SUPPORT/FINANCIAL RESOURCES: Lives with a roommate. He is unemployed and his mother helps him out financially.      BEHAVIORAL HEALTH/SUBSTANCE USE TREATMENT HISTORY  Does patient/parent report a history of prior behavioral health/substance use treatment for patient?   Yes:    Dates Level of Care Facilty/Provider " "Diagnosis/Problem Medications   2023 - multiple admissions, pt unable to provide dates inpatient [facility] SI Zyprexa    6/22, multiple admissions, pt unable to provide dates inpatient [facility] MH/CD Zyprexa, trazodone and \"mood stabilizers\"   2021 x2 inpatient [facility]/CD     7/22 inpatient [facility] detox     2017-present outpatient [facility]     2017-present outpatient [facility] management                                                                             SAFETY ASSESSMENT - SELF  Does patient acknowledge current or past symptoms of dangerousness to self or is previous history noted? yes - reports SA \"a few hours ago. Multiple attempts.  Does parent/significant other report patient has current or past symptoms of dangerousness to self? N\A  Does presenting problem suggest symptoms of dangerousness to self? Yes:      Past Current    Suicidal Thoughts: [x]  [x]    Suicidal Plans: [x]  [x]    Suicidal Intent: [x]  []    Suicide Attempts: [x]  []    Self-Injury [x]  []       For any boxes checked above, provide detail: Pt reporting SI w/ plan to overdose on ETOH/meth/fentanyl. Reports hx of SI; hx of SA.     History of suicide by family member: no  History of suicide by friend/significant other: no  Recent change in frequency/specificity/intensity of suicidal thoughts or self-harm behavior? No-chronic   Current access to firearms, medications, or other identified means of suicide/self-harm? yes - pt has access to means of SI  If yes, willing to restrict access to means of suicide/self-harm? yes - placed on legal hold and belongings secured; awaiting transfer to inpatient psychiatric facility  Protective factors present:  Actively engaged in treatment and Willing to address in treatment     SAFETY ASSESSMENT - OTHERS  Does patient acknowledge current or past symptoms of aggressive behavior or risk to others or is previous history noted? no  Does parent/significant other report patient has " "current or past symptoms of aggressive behavior or risk to others?  N\A  Does presenting problem suggest symptoms of dangerousness to others? No; denies HI.     LEGAL HISTORY  Does patient acknowledge history of arrest/half-way/[facility] or is previous history noted? Yes; pt is in mental health court for a burglary charge.      Crisis Safety Plan completed and copy given to patient? N\A     ABUSE/NEGLECT SCREENING  Does patient report feeling “unsafe” in his/her home, or afraid of anyone?  no  Does patient report any history of physical, sexual, or emotional abuse?  no  Does parent or significant other report any of the above? N\A  Is there evidence of neglect by self?  no  Is there evidence of neglect by a caregiver? N/A  Does the patient/parent report any history of CPS/APS/police involvement related to suspected abuse/neglect or domestic violence? no  Based on the information provided during the current assessment, is a mandated report of suspected abuse/neglect being made?  No     SUBSTANCE USE SCREENING  Yes:  [place] all substances used in the past 30 days:         Last Use Amount   [x]   Alcohol 07/30/2022 \"a handle\" daily   []   Marijuana       []   Heroin       [x]   Prescription Opioids  (used without prescription, for    recreation, or in excess of prescribed amount) : fentanyl Not specified Not specified   []   Other Prescription  (used without prescription, for    recreation, or in excess of prescribed amount)       []   Cocaine       [x]   Methamphetamine 07/30/2022 Not specified   []   \"\" drugs (ectasy, MDMA)       []   Other substances                     UDS results: collection pending           Breathalyzer results: 0.08     What consequences does the patient associate with any of the above substance use and or addictive behaviors? Family problems, Health problems, Monetary problems     Risk factors for detox (check all that apply):  []   Seizures   [x]   Diaphoretic (sweating)   [x]   Tremors   [x]  "  Hallucinations   [x]   Increased blood pressure   []   Decreased blood pressure   []   Other   []   None       [x] Patient education on risk factors for detoxification and instructed to return to ER as needed.        MENTAL STATUS              Participation: Limited, falling asleep during assessment  Grooming: Casual  Orientation: Alert and Fully Oriented  Behavior: Tense  Eye contact: Limited  Mood: Depressed and Anxious  Affect: Blunted, Sad and Anxious  Thought process: Logical and Goal-directed  Thought content: Within normal limits  Speech: Pressured  Perception: Within normal limits  Memory:  Poor memory for chronology of events  Insight: Poor  Judgment:  Poor  Other:     Collateral information:   Source:  [] Significant other present in person:   [] Significant other by telephone  [] [facility]   [x] [facility]  [x] [facility]  [x] Other: ERP     [] Unable to complete full assessment due to:  [] Acute intoxication  [] Patient declined to participate/engage  [] Patient verbally unresponsive  [] Significant cognitive deficits  [] Significant perceptual distortions or behavioral disorganization  [x] Other: N/A       CLINICAL IMPRESSIONS:  Primary:  Suicidal Ideation  Secondary:  Polysubstance abuse                      IDENTIFIED NEEDS/PLAN:  [Trigger DISPOSITION list for any items marked]     [x]  Imminent safety risk - self []  Imminent safety risk - others   []  Acute substance withdrawal []  Psychosis/Impaired reality testing   [x]  Mood/anxiety [x]  Substance use/Addictive behavior   [x]  Maladaptive behaviro []  Parent/child conflict   []  Family/Couples conflict []  Biomedical   []  Housing []  Financial   []   Legal   Occupational/Educational   []  Domestic violence []  Other:      Recommended Plan of Care:  Actively being addressed by [facility] and [facility], Refer to inpatient psychiatric referral and 1:1 Observation. Pt reporting SI w/ overdose on  ETOH/meth/fentanyl. Denies HI/hallucinations. Findings discussed with ERP who agrees pt needs to transfer to an inpatient psychiatric facility for further evaluation and stabilization. [place] medicaid insurance plan. Inpatient psychiatric referrals faxed to [facility], [facility], [facility]. Reviewed community resources with pt.   *Telesitter may not be utilized for moderate or high risk patients     Has the Recommended Plan of Care/Level of Observation been reviewed with the patient's assigned nurse? Yes; high risk on [place] scale; 1:1 sitter required.      Does patient/parent or guardian express agreement with the above plan? yes     Referral appointment(s) scheduled? N\A     Alert team only: Pt to be transferred to inpatient  I have discussed findings and recommendations with Dr. Galdamez who is in agreement with these recommendations.      Referral information sent to the following outpatient community providers : [facility]     Referral information sent to the following inpatient community providers : [facility], [facility], [facility]       Boom Weinberg R.N., MBA

## 2023-07-22 ENCOUNTER — HOSPITAL ENCOUNTER (EMERGENCY)
Facility: MEDICAL CENTER | Age: 33
End: 2023-07-22
Attending: EMERGENCY MEDICINE
Payer: MEDICAID

## 2023-07-22 VITALS
SYSTOLIC BLOOD PRESSURE: 145 MMHG | OXYGEN SATURATION: 98 % | WEIGHT: 171.3 LBS | TEMPERATURE: 98.1 F | DIASTOLIC BLOOD PRESSURE: 94 MMHG | HEIGHT: 69 IN | RESPIRATION RATE: 18 BRPM | BODY MASS INDEX: 25.37 KG/M2 | HEART RATE: 85 BPM

## 2023-07-22 DIAGNOSIS — F15.10 METHAMPHETAMINE ABUSE (HCC): ICD-10-CM

## 2023-07-22 LAB
AMPHET UR QL SCN: POSITIVE
BARBITURATES UR QL SCN: NEGATIVE
BENZODIAZ UR QL SCN: POSITIVE
BZE UR QL SCN: NEGATIVE
CANNABINOIDS UR QL SCN: POSITIVE
FENTANYL UR QL: NEGATIVE
METHADONE UR QL SCN: NEGATIVE
OPIATES UR QL SCN: NEGATIVE
OXYCODONE UR QL SCN: NEGATIVE
PCP UR QL SCN: NEGATIVE
POC BREATHALIZER: 0 PERCENT (ref 0–0.01)
PROPOXYPH UR QL SCN: NEGATIVE

## 2023-07-22 PROCEDURE — 302970 POC BREATHALIZER: Performed by: EMERGENCY MEDICINE

## 2023-07-22 PROCEDURE — A9270 NON-COVERED ITEM OR SERVICE: HCPCS | Mod: UD | Performed by: EMERGENCY MEDICINE

## 2023-07-22 PROCEDURE — 700102 HCHG RX REV CODE 250 W/ 637 OVERRIDE(OP): Mod: UD | Performed by: EMERGENCY MEDICINE

## 2023-07-22 PROCEDURE — 302970 POC BREATHALIZER

## 2023-07-22 PROCEDURE — 99284 EMERGENCY DEPT VISIT MOD MDM: CPT

## 2023-07-22 PROCEDURE — 80307 DRUG TEST PRSMV CHEM ANLYZR: CPT

## 2023-07-22 RX ORDER — OLANZAPINE 5 MG/1
10 TABLET ORAL ONCE
Status: COMPLETED | OUTPATIENT
Start: 2023-07-22 | End: 2023-07-22

## 2023-07-22 RX ORDER — HYDROXYZINE HYDROCHLORIDE 25 MG/1
50 TABLET, FILM COATED ORAL ONCE
Status: COMPLETED | OUTPATIENT
Start: 2023-07-22 | End: 2023-07-22

## 2023-07-22 RX ORDER — LORAZEPAM 2 MG/1
2 TABLET ORAL ONCE
Status: COMPLETED | OUTPATIENT
Start: 2023-07-22 | End: 2023-07-22

## 2023-07-22 RX ADMIN — OLANZAPINE 10 MG: 5 TABLET, FILM COATED ORAL at 12:36

## 2023-07-22 RX ADMIN — LORAZEPAM 2 MG: 2 TABLET ORAL at 11:46

## 2023-07-22 RX ADMIN — HYDROXYZINE HYDROCHLORIDE 50 MG: 25 TABLET, FILM COATED ORAL at 11:46

## 2023-07-22 ASSESSMENT — FIBROSIS 4 INDEX: FIB4 SCORE: 1.62

## 2023-07-22 NOTE — ED TRIAGE NOTES
"Chief Complaint   Patient presents with    Suicidal Ideation     Pt reports he overdosed himself on meth and alcohol last night. Pt states \" I think I'm going to die, I'm a danger to myself and need to be locked up. I have pain in my insides and lungs.\"          Pt ambulated to triage for above complaint.  Pt is AO x 4, follows commands, and responds to questions. Pt reports he wants to end his life.     Pt high risk SI. Pt states that he wants to hurt the people helping his ex-girlfriend.     Charge made aware of pt.     BP (!) 156/90   Pulse 95   Temp 36.6 °C (97.9 °F) (Temporal)   Resp (!) 22   Ht 1.753 m (5' 9\")   Wt 77.7 kg (171 lb 4.8 oz)   SpO2 98%     "

## 2023-07-22 NOTE — ED NOTES
Pt aox4, vss, nad, ambulatory steady  Pt understood all dc info and when to seek medical care, no further questions   Pt taken to [facility] by peer recovery

## 2023-07-22 NOTE — ED NOTES
Pt states he smoked a bowl of meth and drank a gallon of whiskey, breathalyzer was .000, pt states he feels like he did damage to his intestines

## 2023-07-22 NOTE — ED NOTES
Report received from Teresa SEVILLA, pt arrives in blue pod, pt changed into gown and all personal belongings placed in bag and taken from pt. Room now available in [facility], transferring pt now.

## 2023-07-22 NOTE — ED PROVIDER NOTES
"ER Provider Note    Scribed for Demetria Bah M.d. by Sarwat Nix. 7/22/2023  11:32 AM    Primary Care Provider: Pcp Pt States None    CHIEF COMPLAINT  Chief Complaint   Patient presents with    Suicidal Ideation     Pt reports he overdosed himself on meth and alcohol last night. Pt states \" I think I'm going to die, I'm a danger to myself and need to be locked up. I have pain in my insides and lungs.\"      EXTERNAL RECORDS REVIEWED  Inpatient Notes The patient was admitted to [facility] on 6/18/23 for suicidal ideation after he overdosed on fentanyl and methamphetamines. Also consuming 1 gall of whiskey at that time. and External ED Note He presented to [facility] on 7/13/23 for suicidal ideation after he consumed a combination of fentanyl, methamphetamine, and whiskey    HPI/ROS  LIMITATION TO HISTORY   Select: Intoxication  OUTSIDE HISTORIAN(S):  None    Abdoul Medina is a 33 y.o. male with a history of psychiatric disorder and polysubstance abuse, who presents to the ED complaining of suicidal ideation onset prior to arrival. The patient states that he intentionally consumed a large amount of methamphetamines and drank a \"gallon of whiskey\" last night. He was ultimately prompted to visit the ED over concerns of being a \"danger to myself.\" Additionally, he describes feeling \"pain in my insides\" Associated symptoms include increased anxiety and difficulty breathing. The patient denies any nausea, vomiting, homicidal ideation, or hallucinations. No alleviating or exacerbating factors noted.     PAST MEDICAL HISTORY  Past Medical History:   Diagnosis Date    Acne 4/15/2013    Alcohol ingestion, more than 4 drinks/day 3/4/2016    Psychiatric disorder     bipolar, schizophrenia, chronic depression    Warts 1/22/2013       SURGICAL HISTORY  Past Surgical History:   Procedure Laterality Date    ORIF, FRACTURE, CLAVICLE Left        FAMILY HISTORY  Family History   Problem Relation Age of Onset    Cancer " "Neg Hx     Diabetes Neg Hx     Heart Disease Neg Hx        SOCIAL HISTORY   reports that he has never smoked. He has never used smokeless tobacco. He reports current alcohol use. He reports current drug use. Drug: Inhaled.    CURRENT MEDICATIONS  Discharge Medication List as of 7/22/2023  1:39 PM        CONTINUE these medications which have NOT CHANGED    Details   olanzapine (ZYPREXA) 10 MG tablet Take 10 mg by mouth every evening., Historical Med      traZODone (DESYREL) 100 MG Tab Take 100 mg by mouth every evening., Historical Med             ALLERGIES  Patient has no known allergies.    PHYSICAL EXAM  BP (!) 156/90   Pulse 95   Temp 36.6 °C (97.9 °F) (Temporal)   Resp (!) 22   Ht 1.753 m (5' 9\")   Wt 77.7 kg (171 lb 4.8 oz)   SpO2 98%   BMI 25.30 kg/m²   Constitutional: Alert, Very anxious appearing  HENT: Normocephalic, Atraumatic, Bilateral external ears normal. Nose normal.   Eyes:  Conjunctiva normal, non-icteric.   Lungs: Non-labored respirations  Skin: Warm, Dry, No erythema, No rash.   Neurologic: Alert, Grossly non-focal.   Psychiatric: Anxious hyperstimulated  Extremities: Ambulating no deformity      DIAGNOSTIC STUDIES    Labs:   Labs Reviewed   URINE DRUG SCREEN - Abnormal; Notable for the following components:       Result Value    Amphetamines Urine Positive (*)     Benzodiazepines Positive (*)     Cannabinoid Metab Positive (*)     All other components within normal limits   POC BREATHALIZER - Normal     COURSE & MEDICAL DECISION MAKING     11:32 AM - Patient was seen and evaluated at bedside. Ordered POC Breathalizer and Urine Drug Screen to evaluate. The patient will be treated with Ativan 2 mg, Hydroxyzine 50 mg, and Zyprexa 10 mg for his symptoms.    12:32 PM - I spoke with Behavioral Health who has seen the patient and feels comfortable with him being discharged. [facility] will take the patient to [facility] for detox. Breathalizer was 0.000. Urine drug screen was positive for amphetamines, " "benzodiazepines, and cannabinoids. Discussed plan for discharge; I advised the patient to follow-up with [facility] now for detox, and to return to the [facility] ED with any new or worsening symptoms, including worsening issues or other concerns. Patient was given the opportunity for questions. I addressed all questions or concerns at this time and they verbalize agreement to the plan of care. Review of vital signs at this visit show: BP (!) 145/94   Pulse 85   Temp 36.7 °C (98.1 °F) (Temporal)   Resp 18   Ht 1.753 m (5' 9\")   Wt 77.7 kg (171 lb 4.8 oz)   SpO2 98%   BMI 25.30 kg/m²      ED Observation Status? No, the patient does not meet criteria for ED Observation status.    INITIAL ASSESSMENT, COURSE AND PLAN  Care Narrative: This is a 33-year-old gentleman who does meth and alcohol presenting very agitated.  He was given some medications to help him calm down a bit.  He was seen by [facility] and will be brought over to [facility] for detox.  Patient is agreeable to this he will be discharged.    ADDITIONAL PROBLEM LIST  Methamphetamine use  Alcohol use    DISPOSITION AND DISCUSSIONS  I have discussed management of the patient with the following physicians and SARAH's:  None    Discussion of management with other [facility] or appropriate source(s): Behavioral Health Spoke to the patient and arranged for detox.      Escalation of care considered, and ultimately not performed: blood analysis.    Barriers to care at this time, including but not limited to: Patient does not have established PCP.     Decision tools and prescription drugs considered including, but not limited to:  none .    The patient will return for new or worsening symptoms and is stable at the time of discharge. Patient was given return precautions. Patient and/or family member verbalizes understanding and will comply.    DISPOSITION:  Patient will be discharged home in stable condition.    FOLLOW UP:  [facility], Emergency Dept  [address] " [facility] [id number] [phone]    Return to the emergency department for worsening issues or other concerns.    FINAL DIAGNOSIS  1. Methamphetamine abuse (HCC)          The note accurately reflects work and decisions made by me.  Demetria Bah M.D.  7/22/2023  4:13 PM     ISarwat (Scribe), am scribing for, and in the presence of, Demetria Bah M.D..    Electronically signed by: Sarwat Nix (Scribe), 7/22/2023    IDemetria M.D. personally performed the services described in this documentation, as scribed by Sarwat Nix in my presence, and it is both accurate and complete.

## 2023-07-22 NOTE — DISCHARGE PLANNING
"ALERT team  note:   33 year old male BIB self today by self d/t current substance use (ETOH and methamphetamines) and passing suicidal ideation (t drink himself to death); later, pt denies SI, HI, or self-harm ideation and requesting detox services; voluntary pt; with recent [facility] ED visit for similar complaint 7/16/23, evaluated by S psychiatry, and Dc'd to self after meeting with [facility] Support Team/[facility]; also with recent [facility] ED visist 7/13/23 and 7/5/23 (\"reporting substance use and feeling suicidal with a plan to overdose on fentanyl\") with similar complaints with noted psych diagnoses include  Methamphetamine use disorder severe, Alcohol use disorder severe, Opiate used disorder occasional, and Polysubstance induced mood disorder; pt with noted h/o of stated several suicide attempts with \"overdoses\" on illicit substances;noted current psych meds include Olanzapine 10 mg PO daily, buproprion  mg PO daily, Hydroxyzine 50 mg PO TID PRN, and Trazodone 100 mg PO daily, states he is not taking these meds; current substance use inlcudes EtOH daily up to 1 gallon liquor with last use 7/21/22, Methamphetamines smoking dialy with last use 7/21/23, and Opioids/Fentanyl weekly with last use 7/20/23; pt unemployed, financially supported by family; lives with a roommate who he does not like; no acute MH crisis noted at this time and presenting with chronic mental health, substance use, and psychosocial issues; Writer RN reviewed community CD and MH issues resources with pt, with written information given, including [facility], [facility], [place] [facility], [address] [facility] [address], [facility], and [facility] transportation included in pt's insurance plan; pt verbalized understanding and to talk with Peer Recovery , hany Torres: sober support and detox services; writer RN updated [facility] ERP Dr. Bah; pt to DC to self today to [facility] for a voluntary detox evaluation with transport by [facility] , Brian; pt received Olazapine 10 mg, Ativan 2 mg, and Hydroxyzine 50 mg PO prior to DC from the ED    [place] Medicaid insurance plan

## 2024-11-17 ENCOUNTER — HOSPITAL ENCOUNTER (EMERGENCY)
Facility: MEDICAL CENTER | Age: 34
End: 2024-11-17
Attending: EMERGENCY MEDICINE
Payer: MEDICAID

## 2024-11-17 VITALS
DIASTOLIC BLOOD PRESSURE: 87 MMHG | OXYGEN SATURATION: 95 % | SYSTOLIC BLOOD PRESSURE: 125 MMHG | HEART RATE: 102 BPM | RESPIRATION RATE: 19 BRPM | HEIGHT: 69 IN | BODY MASS INDEX: 26.84 KG/M2 | TEMPERATURE: 98.4 F | WEIGHT: 181.22 LBS

## 2024-11-17 DIAGNOSIS — F10.10 ALCOHOL ABUSE: ICD-10-CM

## 2024-11-17 DIAGNOSIS — F15.10 METHAMPHETAMINE ABUSE (HCC): ICD-10-CM

## 2024-11-17 LAB
AMPHET UR QL SCN: POSITIVE
BARBITURATES UR QL SCN: NEGATIVE
BENZODIAZ UR QL SCN: NEGATIVE
BZE UR QL SCN: NEGATIVE
CANNABINOIDS UR QL SCN: NEGATIVE
EKG IMPRESSION: NORMAL
FENTANYL UR QL: NEGATIVE
METHADONE UR QL SCN: NEGATIVE
OPIATES UR QL SCN: NEGATIVE
OXYCODONE UR QL SCN: NEGATIVE
PCP UR QL SCN: NEGATIVE
POC BREATHALIZER: 0 PERCENT (ref 0–0.01)
PROPOXYPH UR QL SCN: NEGATIVE
TROPONIN T SERPL-MCNC: 9 NG/L (ref 6–19)

## 2024-11-17 PROCEDURE — 302970 POC BREATHALIZER

## 2024-11-17 PROCEDURE — 93005 ELECTROCARDIOGRAM TRACING: CPT | Performed by: EMERGENCY MEDICINE

## 2024-11-17 PROCEDURE — 90791 PSYCH DIAGNOSTIC EVALUATION: CPT

## 2024-11-17 PROCEDURE — 84484 ASSAY OF TROPONIN QUANT: CPT

## 2024-11-17 PROCEDURE — 99285 EMERGENCY DEPT VISIT HI MDM: CPT

## 2024-11-17 PROCEDURE — 302970 POC BREATHALIZER: Performed by: EMERGENCY MEDICINE

## 2024-11-17 PROCEDURE — 96374 THER/PROPH/DIAG INJ IV PUSH: CPT

## 2024-11-17 PROCEDURE — 36415 COLL VENOUS BLD VENIPUNCTURE: CPT

## 2024-11-17 PROCEDURE — 80307 DRUG TEST PRSMV CHEM ANLYZR: CPT

## 2024-11-17 PROCEDURE — 700111 HCHG RX REV CODE 636 W/ 250 OVERRIDE (IP): Mod: UD | Performed by: EMERGENCY MEDICINE

## 2024-11-17 RX ORDER — LORAZEPAM 2 MG/ML
1 INJECTION INTRAMUSCULAR ONCE
Status: COMPLETED | OUTPATIENT
Start: 2024-11-17 | End: 2024-11-17

## 2024-11-17 RX ADMIN — LORAZEPAM 1 MG: 2 INJECTION INTRAMUSCULAR; INTRAVENOUS at 17:45

## 2024-11-17 ASSESSMENT — FIBROSIS 4 INDEX: FIB4 SCORE: 2.54

## 2024-11-18 NOTE — CONSULTS
"[facility]   TRIAGE ASSESSMENT    Name: Abdoul Medina  MRN: [id number]  : 1990  Age: 34 y.o.  Date of assessment: 2024  PCP: Pcp Pt States None  Persons in attendance: Patient  Patient Location: [facility]    CHIEF COMPLAINT/PRESENTING ISSUE (as stated by Patient, ER RN, ERP):   Chief Complaint   Patient presents with    Suicidal Ideation     The pt reports SI and states that he took a bunch of meth and fentanyl since Wednesday in order to kill himself. The pt states that he wants detox because he was starting to withdrawal at home. The pt is hyperverbal and tangential with racing thoughts      Patient is a 35 y/o male self-presenting to ER reporting recent methamphetamine and alcohol use along passive SI. Patient provided with Ativan 1 MG orally and allowed to rest prior to evaluation. Patient currently denies any SI  and requesting detox services. EMR notes a diagnose hx of Methamphetamine use disorder severe, Alcohol use disorder severe, Opiate used disorder occasional, and Polysubstance induced mood disorder; pt with noted h/o of stated several suicide attempts with \"overdoses\" on illicit substances;reports current psych med Olanzapine 10 mg PO daily with a majority of compliance but is not connected to OP PMH treatment. Patient is goal oriented and forward thinking when explaining his need to detox from alcohol and all substances before starting rehab program through [facility]. Patient able to contract for safety and discharge over to [facility] for voluntary admission to continue alcohol detox. ERP lifted legal hold and tax voucher provided upon discharge.       CURRENT LIVING SITUATION/SOCIAL SUPPORT/FINANCIAL RESOURCES: unhoused; unknown social support    BEHAVIORAL HEALTH/SUBSTANCE USE TREATMENT HISTORY  Does patient/parent report a history of prior behavioral health/substance use treatment for patient?   Yes:    Dates Level of Care Facilty/Provider Diagnosis/Problem " Medications   2023 x 6   2022 x 3 IP [facility] Bipolar affective disorder, current episode depressed.Opiate dependence. Alcohol dependence. Methamphetamine abuse.   Zyprexa 10 QHS            Unknown timeframe IP [facility]            2012 x 2 IP [facility]                                            SAFETY ASSESSMENT - SELF  Does patient acknowledge current or past symptoms of dangerousness to self or is previous history noted? Yes; EMR notes patient had disclosed previous attempt by fentanyl, methamphetamines and alcohol overdose.    Does parent/significant other report patient has current or past symptoms of dangerousness to self? N\A  Does presenting problem suggest symptoms of dangerousness to self? No; patient denies any SI; able to contract for safety; pt is forward thinking and goal oriented when requesting assistance to connect with [facility] while awaiting admission into [facility] program.     SAFETY ASSESSMENT - OTHERS  Does patient acknowledge current or past symptoms of aggressive behavior or risk to others or is previous history noted? Yes; EMR notes during IP at [facility] pt endorsed HI toward his girlfriend and his girlfriend's friends   Does parent/significant other report patient has current or past symptoms of aggressive behavior or risk to others?  N\A  Does presenting problem suggest symptoms of dangerousness to others? No; denies any HI; calm and cooperative with ER staff.     LEGAL HISTORY  Does patient acknowledge history of arrest/FPC/custodial or is previous history noted? Yes; previously in mental health court for a burglary charge.      Crisis Safety Plan completed and copy given to patient? Completed verbally at bedside    ABUSE/NEGLECT SCREENING  Does patient report feeling “unsafe” in his/her home, or afraid of anyone?  no  Does patient report any history of physical, sexual, or emotional abuse?  no  Does parent or significant other report any of the above? N\A  Is there evidence of neglect by  "self?  no  Is there evidence of neglect by a caregiver? no  Does the patient/parent report any history of CPS/APS/police involvement related to suspected abuse/neglect or domestic violence? no  Based on the information provided during the current assessment, is a mandated report of suspected abuse/neglect being made?  No    SUBSTANCE USE SCREENING  Yes:  [place] all substances used in the past 30 days:      Last Use Amount   [x]   Alcohol Within 24 hours Daily use   []   Marijuana     []   Heroin     []   Prescription Opioids  (used without prescription, for    recreation, or in excess of prescribed amount)     []   Other Prescription  (used without prescription, for    recreation, or in excess of prescribed amount)     []   Cocaine      [x]   Methamphetamine Within 24 hours Unknown amount    []   \"\" drugs (ectasy, MDMA)     []   Other substances        UDS results: amphetamines  Breathalyzer results: 0.00    What consequences does the patient associate with any of the above substance use and or addictive behaviors? Health problems: alcohol and substance use    Risk factors for detox (check all that apply):  []  Seizures   [x]  Diaphoretic (sweating)   [x]  Tremors   [x]  Hallucinations   []  Increased blood pressure   []  Decreased blood pressure   []  Other   []  None      [x] Patient education on risk factors for detoxification and instructed to return to ER as needed.      MENTAL STATUS   Participation: Limited verbal participation and Engaged  Grooming: Disheveled  Orientation: Fully Oriented and Drowsy/Somnolent  Behavior: Calm  Eye contact: Limited  Mood: Depressed  Affect: Blunted  Thought process: Logical and Goal-directed  Thought content: Within normal limits  Speech: Soft  Perception: Within normal limits  Memory:  No gross evidence of memory deficits  Insight: Adequate  Judgment:  Adequate  Other:    Collateral information:   Source:  [] Significant other present in person:   [] Significant other " by telephone  [] [facility]   [x] [facility]  [x] [facility]  [x] Other: ERP    [] Unable to complete full assessment due to:  [] Acute intoxication  [] Patient declined to participate/engage  [] Patient verbally unresponsive  [] Significant cognitive deficits  [] Significant perceptual distortions or behavioral disorganization  [x] Other: N/A     CLINICAL IMPRESSIONS:  Primary:  Depression  Secondary:  Substance use, homewlessness        IDENTIFIED NEEDS/PLAN:  [Trigger DISPOSITION list for any items marked]    []  Imminent safety risk - self [] Imminent safety risk - others   []  Acute substance withdrawal [x]  Psychosis/Impaired reality testing   [x]  Mood/anxiety [x]  Substance use/Addictive behavior   []  Maladaptive behaviro []  Parent/child conflict   []  Family/Couples conflict []  Biomedical   [x]  Housing [x]  Financial   []   Legal  Occupational/Educational   []  Domestic violence []  Other:     Recommended Plan of Care:  Refer to [facility] for voluntary admission for alcohol detoxification    Has the Recommended Plan of Care/Level of Observation been reviewed with the patient's assigned nurse? yes    Does patient/parent or guardian express agreement with the above plan? yes    Referral appointment(s) scheduled? N\A    Alert team only: Patient denies any SI; forward thinking and goal oriented, requesting assistance with alcohol detox, reports plans to connect with [facility] in 2 days requiring detoxing form alcohol and methamphetamines. Patient able to contract for safety and provided with cab voucher # [id number] over to [facility] upon discharge.   I have discussed findings and recommendations with Dr. Alvares who is in agreement with these recommendations.     Referral information sent to the following outpatient community providers : [email or web address]    Referral information sent to the following inpatient community providers : N/A        Lizett Stovall,  [email or web address]  11/17/2024

## 2024-11-18 NOTE — ED PROVIDER NOTES
ER Provider Note    Scribed for Reynaldo López M.d. by Jung Virgen. 11/17/2024  5:24 PM    Primary Care Provider: Pcp Pt States None    CHIEF COMPLAINT   Chief Complaint   Patient presents with    Suicidal Ideation     The pt reports SI and states that he took a bunch of meth and fentanyl since Wednesday in order to kill himself. The pt states that he wants detox because he was starting to withdrawal at home. The pt is hyperverbal and tangential with racing thoughts     EXTERNAL RECORDS REVIEWED  Other Patient was seen here multiple times in July for suicidal ideation and methamphetamine abuse. The patient has a history of alcohol abuse and schizophrenia. He was also seen at [facility] earlier today by psych. He also had a blood test at 2:30 PM today that tested negative. His alcohol was negative and he tested positive for suicidal ideation     HPI/ROS  LIMITATION TO HISTORY   Select: Altered mental status / Confusion  OUTSIDE HISTORIAN(S):  None    Abdoul Medina is a 34 y.o. male who presents to the ED complaining of Suicidal and Homicidal Ideation. The patient reports he is going through alcohol and methamphetamine detox currently. His last drink was yesterday. He notes muscle aches and pains, chills sweating and is seeing shadows in the corner of his eye. He states he has chronic depression, anxiety and schizophrenia    PAST MEDICAL HISTORY  Past Medical History:   Diagnosis Date    Acne 4/15/2013    Alcohol ingestion, more than 4 drinks/day 3/4/2016    Psychiatric disorder     bipolar, schizophrenia, chronic depression    Warts 1/22/2013       SURGICAL HISTORY  Past Surgical History:   Procedure Laterality Date    ORIF, FRACTURE, CLAVICLE Left        FAMILY HISTORY  Family History   Problem Relation Age of Onset    Cancer Neg Hx     Diabetes Neg Hx     Heart Disease Neg Hx        SOCIAL HISTORY   reports that he has never smoked. He has never used smokeless tobacco. He reports current alcohol use. He  "reports current drug use. Drug: Inhaled.    CURRENT MEDICATIONS  Previous Medications    OLANZAPINE (ZYPREXA) 10 MG TABLET    Take 10 mg by mouth every evening.    TRAZODONE (DESYREL) 100 MG TAB    Take 100 mg by mouth every evening.       ALLERGIES  Patient has no known allergies.    PHYSICAL EXAM  BP (!) 168/92   Pulse (!) 133   Temp 36.9 °C (98.4 °F) (Temporal)   Resp 18   Ht 1.753 m (5' 9\")   Wt 82.2 kg (181 lb 3.5 oz)   SpO2 93%   BMI 26.76 kg/m²     Constitutional: Well developed, Well nourished, Mild distress,    HENT: Normocephalic, Atraumatic, Dry mucous membranes  Eyes: PERRLA, EOMI, Conjunctiva normal, No discharge.   Neck: No tenderness, Supple, No stridor.   Cardiovascular: Normal rhythm. Borderline tachycardic  Thorax & Lungs: Clear to auscultation bilaterally, No respiratory distress.   Abdomen: Soft, No tenderness, No masses.   Skin: Warm, Dry, No rash.    Musculoskeletal: No major deformities noted.  Neurologic: Awake, alert. Moves all extremities spontaneously.  Psychiatric: Slightly anxious pressured speech, Positive for HI and SI    DIAGNOSTIC STUDIES    EKG/LABS  Results for orders placed or performed during the hospital encounter of 11/17/24   Urine Drug Screen    Collection Time: 11/17/24  5:05 PM   Result Value Ref Range    Amphetamines Urine Positive (A) Negative    Barbiturates Negative Negative    Benzodiazepines Negative Negative    Cocaine Metabolite Negative Negative    Fentanyl, Urine Negative Negative    Methadone Negative Negative    Opiates Negative Negative    Oxycodone Negative Negative    Phencyclidine -Pcp Negative Negative    Propoxyphene Negative Negative    Cannabinoid Metab Negative Negative   POC BREATHALIZER    Collection Time: 11/17/24  5:10 PM   Result Value Ref Range    POC Breathalizer 0.00 0.00 - 0.01 Percent   Troponin - STAT Once    Collection Time: 11/17/24  5:43 PM   Result Value Ref Range    Troponin T 9 6 - 19 ng/L   EKG    Collection Time: 11/17/24  5:52 " PM   Result Value Ref Range    Report       [facility] Emergency Dept.    Test Date:  2024  Pt Name:    PRECIOUS DE LUNA                  Department: ER  MRN:        [id number]                      Room:        35  Gender:     Male                         Technician: [id number]  :        1990                   Requested By:JESUS CEBALLOS  Order #:    [id number]                    Reading MD: JESUS CEBALLOS MD    Measurements  Intervals                                Axis  Rate:       95                           P:          61  RI:         169                          QRS:        57  QRSD:       91                           T:          21  QT:         376  QTc:        473    Interpretive Statements  Sinus rhythm  Borderline prolonged QT interval  Baseline wander in lead(s) V2  Compared to ECG 2023 03:36:21  No significant changes  Electronically Signed On 2024 20:23:22 PST by JESUS CEBALLOS MD       I have independently interpreted this EKG    COURSE & MEDICAL DECISION MAKING     ASSESSMENT, COURSE AND PLAN  Care Narrative: Patient reported suicidal ideation however the patient was recently seen at [facility] 2 hours ago with similar presentation the patient was seen by psychiatry and discharged.  Patient states he does not feel comfortable going out on the streets because of his suicidal thoughts and his confusion due to alcohol and methamphetamine withdrawal.  Give the patient a dose of Ativan, is awaiting evaluation from behavioral health.  The patient was complaining of some chest pains, EKG was negative, troponin was negative, I did not repeat laboratory tests because she was just recently evaluated at [facility].  5:24 PM - Patient seen and examined at bedside. Discussed plan of care, including labs for evaluation. Patient agrees to the plan of care. The patient will be medicated with Ativan 1 mg. Ordered for POC breathalyzer, urine drug screen and troponin  to evaluate his symptoms.     DISPOSITION AND DISCUSSIONS  Discussion of management with other Q or appropriate source(s): Alert team      FINAL DIANGOSIS  1. Methamphetamine abuse (HCC)    2. Alcohol abuse    3. Suicidal ideation    4. Chest pain, unspecified type         IJung (Scribe), am scribing for, and in the presence of, Reynaldo López M.D..    Electronically signed by: Jung Virgen (Scribe), 11/17/2024    IReynaldo M.D. personally performed the services described in this documentation, as scribed by Jung Virgen in my presence, and it is both accurate and complete.       The note accurately reflects work and decisions made by me.  Reynaldo López M.D.  11/17/2024  8:25 PM

## 2024-11-18 NOTE — PROGRESS NOTES
"ED Observation Progress Note    Scribed for Hermes Alvares M.d. by Kaya Monge. 2024  10:17 PM    Date of Service: 24    Interval History and Interventions  Contracted for safety, the patient is being transferred to [facility].     Physical Exam  /87   Pulse (!) 102   Temp 36.9 °C (98.4 °F) (Temporal)   Resp 19   Ht 1.753 m (5' 9\")   Wt 82.2 kg (181 lb 3.5 oz)   SpO2 95%   BMI 26.76 kg/m² .    Constitutional: Awake and alert. Nontoxic  HENT:  Grossly normal  Eyes: Grossly normal  Neck: Normal range of motion  Cardiovascular: Normal heart rate   Thorax & Lungs: No respiratory distress  Abdomen: Nontender  Skin:  No pathologic rash.   Extremities: Well perfused  Psychiatric: Affect normal    Labs  Results for orders placed or performed during the hospital encounter of 24   Urine Drug Screen    Collection Time: 24  5:05 PM   Result Value Ref Range    Amphetamines Urine Positive (A) Negative    Barbiturates Negative Negative    Benzodiazepines Negative Negative    Cocaine Metabolite Negative Negative    Fentanyl, Urine Negative Negative    Methadone Negative Negative    Opiates Negative Negative    Oxycodone Negative Negative    Phencyclidine -Pcp Negative Negative    Propoxyphene Negative Negative    Cannabinoid Metab Negative Negative   POC BREATHALIZER    Collection Time: 24  5:10 PM   Result Value Ref Range    POC Breathalizer 0.00 0.00 - 0.01 Percent   Troponin - STAT Once    Collection Time: 24  5:43 PM   Result Value Ref Range    Troponin T 9 6 - 19 ng/L   EKG    Collection Time: 24  5:52 PM   Result Value Ref Range    Report       [facility] Emergency Dept.    Test Date:  2024  Pt Name:    PRECIOUS DE LUNA                  Department: ER  MRN:        [id number]                      Room:       [facility]  Gender:     Male                         Technician: [id number]  :        1990                   Requested " By:JESUS CEBALLOS  Order #:    [id number]                    Reading MD: JESUS CEBALLOS MD    Measurements  Intervals                                Axis  Rate:       95                           P:          61  WI:         169                          QRS:        57  QRSD:       91                           T:          21  QT:         376  QTc:        473    Interpretive Statements  Sinus rhythm  Borderline prolonged QT interval  Baseline wander in lead(s) V2  Compared to ECG 07/16/2023 03:36:21  No significant changes  Electronically Signed On 11- 20:23:22 PST by JESUS CEBALLOS MD       Problem List  1. ***    {ERP Attestation (ERP ONLY):200109}

## 2024-11-18 NOTE — PROGRESS NOTES
"ED Observation Progress Note    Date of Service: 11/17/24    Interval History and Interventions  Patient was seen and evaluated the emergency department complaining of suicidal ideation, potential homicidal ideation.  Also reported going through alcohol and methamphetamine detox, last drink was yesterday.Patient's workup here notable for positive methamphetamines, alcohol is negative.  EKG and troponin also normal.  Patient was evaluated by alert team, had improvement in symptoms with medications administered here.  Patient was able to contract for safety.  Will be discharged and instructed to go to [facility].  Patient was seen and evaluated by alert team, see notes for further details.    Physical Exam  /87   Pulse (!) 102   Temp 36.9 °C (98.4 °F) (Temporal)   Resp 19   Ht 1.753 m (5' 9\")   Wt 82.2 kg (181 lb 3.5 oz)   SpO2 95%   BMI 26.76 kg/m² .    Constitutional: Awake and alert. Nontoxic  HENT:  Grossly normal  Eyes: Grossly normal  Neck: Normal range of motion  Cardiovascular: Normal heart rate   Thorax & Lungs: No respiratory distress  Abdomen: Nontender  Skin:  No pathologic rash.   Extremities: Well perfused  Psychiatric: Affect normal    Labs  Results for orders placed or performed during the hospital encounter of 11/17/24   Urine Drug Screen    Collection Time: 11/17/24  5:05 PM   Result Value Ref Range    Amphetamines Urine Positive (A) Negative    Barbiturates Negative Negative    Benzodiazepines Negative Negative    Cocaine Metabolite Negative Negative    Fentanyl, Urine Negative Negative    Methadone Negative Negative    Opiates Negative Negative    Oxycodone Negative Negative    Phencyclidine -Pcp Negative Negative    Propoxyphene Negative Negative    Cannabinoid Metab Negative Negative   POC BREATHALIZER    Collection Time: 11/17/24  5:10 PM   Result Value Ref Range    POC Breathalizer 0.00 0.00 - 0.01 Percent   Troponin - STAT Once    Collection Time: 11/17/24  5:43 PM "   Result Value Ref Range    Troponin T 9 6 - 19 ng/L   EKG    Collection Time: 24  5:52 PM   Result Value Ref Range    Report       [facility] Emergency Dept.    Test Date:  2024  Pt Name:    PRECIOUS DE LUNA                  Department: ER  MRN:        [id number]                      Room:       [facility]  Gender:     Male                         Technician: [id number]  :        1990                   Requested By:JESUS CEBALLOS  Order #:    [id number]                    Reading MD: JESUS CEBALLOS MD    Measurements  Intervals                                Axis  Rate:       95                           P:          61  WY:         169                          QRS:        57  QRSD:       91                           T:          21  QT:         376  QTc:        473    Interpretive Statements  Sinus rhythm  Borderline prolonged QT interval  Baseline wander in lead(s) V2  Compared to ECG 2023 03:36:21  No significant changes  Electronically Signed On 2024 20:23:22 PST by JESUS CEBALLOS MD         Radiology  No orders to display       Problem List  1.  Methamphetamine use  2.  Alcohol use  3.  Chest pain    Electronically signed by: Hermes Alvares M.D., 2024 10:07 PM

## 2024-11-18 NOTE — ED TRIAGE NOTES
"Chief Complaint   Patient presents with    Suicidal Ideation     The pt reports SI and states that he took a bunch of meth and fentanyl since Wednesday in order to kill himself. The pt states that he wants detox because he was starting to withdrawal at home. The pt is hyperverbal and tangential with racing thoughts       BP (!) 168/92   Pulse (!) 133   Temp 36.9 °C (98.4 °F) (Temporal)   Resp 18   Ht 1.753 m (5' 9\")   Wt 82.2 kg (181 lb 3.5 oz)   SpO2 93%   BMI 26.76 kg/m²     Pt ambulatory to triage. Pt A&Ox4.  Pt placed back in lobby, educated on triage process, and told to inform staff of any change in condition.     "

## 2025-01-03 ENCOUNTER — HOSPITAL ENCOUNTER (EMERGENCY)
Facility: MEDICAL CENTER | Age: 35
End: 2025-01-03
Attending: STUDENT IN AN ORGANIZED HEALTH CARE EDUCATION/TRAINING PROGRAM
Payer: COMMERCIAL

## 2025-01-03 VITALS
HEART RATE: 93 BPM | DIASTOLIC BLOOD PRESSURE: 86 MMHG | OXYGEN SATURATION: 95 % | WEIGHT: 185 LBS | RESPIRATION RATE: 18 BRPM | BODY MASS INDEX: 27.4 KG/M2 | HEIGHT: 69 IN | TEMPERATURE: 98.6 F | SYSTOLIC BLOOD PRESSURE: 130 MMHG

## 2025-01-03 DIAGNOSIS — F19.10 POLYSUBSTANCE ABUSE (HCC): ICD-10-CM

## 2025-01-03 DIAGNOSIS — F10.920 ALCOHOLIC INTOXICATION WITHOUT COMPLICATION (HCC): ICD-10-CM

## 2025-01-03 LAB
EKG IMPRESSION: NORMAL
POC BREATHALIZER: 0.11 PERCENT (ref 0–0.01)

## 2025-01-03 PROCEDURE — 700102 HCHG RX REV CODE 250 W/ 637 OVERRIDE(OP): Mod: UD | Performed by: STUDENT IN AN ORGANIZED HEALTH CARE EDUCATION/TRAINING PROGRAM

## 2025-01-03 PROCEDURE — A9270 NON-COVERED ITEM OR SERVICE: HCPCS | Mod: UD | Performed by: STUDENT IN AN ORGANIZED HEALTH CARE EDUCATION/TRAINING PROGRAM

## 2025-01-03 PROCEDURE — 99285 EMERGENCY DEPT VISIT HI MDM: CPT

## 2025-01-03 PROCEDURE — 90791 PSYCH DIAGNOSTIC EVALUATION: CPT

## 2025-01-03 PROCEDURE — 93005 ELECTROCARDIOGRAM TRACING: CPT | Mod: TC | Performed by: STUDENT IN AN ORGANIZED HEALTH CARE EDUCATION/TRAINING PROGRAM

## 2025-01-03 PROCEDURE — 302970 POC BREATHALIZER

## 2025-01-03 RX ORDER — LORAZEPAM 2 MG/1
2 TABLET ORAL ONCE
Status: COMPLETED | OUTPATIENT
Start: 2025-01-03 | End: 2025-01-03

## 2025-01-03 RX ORDER — OLANZAPINE 5 MG/1
10 TABLET ORAL ONCE
Status: COMPLETED | OUTPATIENT
Start: 2025-01-03 | End: 2025-01-03

## 2025-01-03 RX ADMIN — LORAZEPAM 2 MG: 2 TABLET ORAL at 02:21

## 2025-01-03 RX ADMIN — OLANZAPINE 10 MG: 5 TABLET, FILM COATED ORAL at 02:21

## 2025-01-03 ASSESSMENT — FIBROSIS 4 INDEX: FIB4 SCORE: 1.55

## 2025-01-03 NOTE — ED NOTES
ERP assessed pt, ok for pt to meet with [facility] and receive mental health/substance use resources.
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
## 2025-01-03 NOTE — ED NOTES
All lines and monitors DC'd.  Discharge instructions given, questions answered.  Ambulated out of ER, escorted by [facility] staff.  Pt states all belongings in possession.  Instructed not to drive after pain medication and pt verbalizes understanding.  RX x0 given.

## 2025-01-03 NOTE — DISCHARGE SUMMARY
"  ED Observation Discharge Summary    Patient:Precious De Luna  Patient : 1990  Patient MRN: [id number]  Patient PCP: Pcp Pt States None    Admit Date: 1/3/2025  Discharge Date and Time: 25 8:48 AM  Discharge Diagnosis: Substance abuse  Discharge Attending: Evan Huynh M.D.  Discharge Service: ED Observation    ED Course  Precious is a 34 y.o. male who was evaluated at [facility] for substance abuse and depression.  There was a period time where he was mentioning suicidal thoughts.  Now that he is sober he no longer feeling things.  He denies suicidal ideation to me.  He wants follow-up with peer recovery which we will do for him.    Discharge Exam:  BP (!) 145/91   Pulse 99   Temp 36.4 °C (97.6 °F) (Temporal)   Resp 20   Ht 1.753 m (5' 9\")   Wt 83.9 kg (185 lb)   SpO2 97%   BMI 27.32 kg/m² .    Constitutional: Awake and alert. Nontoxic  HENT:  Grossly normal  Eyes: Grossly normal  Neck: Normal range of motion  Cardiovascular: Normal heart rate   Thorax & Lungs: No respiratory distress  Abdomen: Nontender  Skin:  No pathologic rash.   Extremities: Well perfused  Psychiatric: Affect normal    Labs  Results for orders placed or performed during the hospital encounter of 25   POC BREATHALIZER    Collection Time: 25  1:25 AM   Result Value Ref Range    POC Breathalizer 0.110 (A) 0.00 - 0.01 Percent   EKG    Collection Time: 25  3:03 AM   Result Value Ref Range    Report       [facility] Emergency Dept.    Test Date:  2025  Pt Name:    PRECIOUS DE LUNA                  Department: ER  MRN:        [id number]                      Room:       [facility]  Gender:     Male                         Technician: [id number]  :        1990                   Requested By:GYPSY CONNOLLY  Order #:    [id number]                    Gregoria MD:    Measurements  Intervals                                Axis  Rate:       93                           P:          76  MO:         142         "                  QRS:        77  QRSD:       97                           T:          63  QT:         355  QTc:        442    Interpretive Statements  Sinus rhythm  Compared to ECG 11/17/2024 17:52:50  No significant changes         Radiology  No orders to display       Medications:   New Prescriptions    No medications on file       My final assessment includes polysubstance abuse  Upon Reevaluation, the patient's condition has: Improved; and will be discharged.    Patient discharged from ED Observation status at 8:49 AM  (Time) 1/3/25 (Date).     Total time spent on this ED Observation discharge encounter is > 30 Minutes    Electronically signed by: Evan Huynh M.D., 1/3/2025 8:48 AM
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
## 2025-01-03 NOTE — ED NOTES
"This RN awoke pt, who is Ox2, but thinks he is at [facility].  He reports to this RN, \"I tried to kill myself last night and I am still suicidal.\"      ERP made aware of pt's statements.  PT awake enough to eat a sandwich and drink juice.   "

## 2025-01-20 ENCOUNTER — HOSPITAL ENCOUNTER (EMERGENCY)
Facility: MEDICAL CENTER | Age: 35
End: 2025-01-20
Payer: COMMERCIAL

## 2025-01-20 ENCOUNTER — HOSPITAL ENCOUNTER (EMERGENCY)
Facility: MEDICAL CENTER | Age: 35
End: 2025-01-20
Attending: EMERGENCY MEDICINE
Payer: COMMERCIAL

## 2025-01-20 ENCOUNTER — APPOINTMENT (OUTPATIENT)
Dept: RADIOLOGY | Facility: MEDICAL CENTER | Age: 35
End: 2025-01-20
Attending: EMERGENCY MEDICINE
Payer: COMMERCIAL

## 2025-01-20 VITALS
SYSTOLIC BLOOD PRESSURE: 123 MMHG | HEART RATE: 88 BPM | DIASTOLIC BLOOD PRESSURE: 64 MMHG | HEIGHT: 69 IN | BODY MASS INDEX: 27.4 KG/M2 | WEIGHT: 185 LBS | RESPIRATION RATE: 16 BRPM | OXYGEN SATURATION: 96 % | TEMPERATURE: 97 F

## 2025-01-20 VITALS
TEMPERATURE: 98.2 F | HEIGHT: 69 IN | DIASTOLIC BLOOD PRESSURE: 85 MMHG | WEIGHT: 185 LBS | OXYGEN SATURATION: 98 % | HEART RATE: 64 BPM | SYSTOLIC BLOOD PRESSURE: 129 MMHG | RESPIRATION RATE: 20 BRPM | BODY MASS INDEX: 27.4 KG/M2

## 2025-01-20 DIAGNOSIS — F32.A DEPRESSION, UNSPECIFIED DEPRESSION TYPE: ICD-10-CM

## 2025-01-20 DIAGNOSIS — F15.10 METHAMPHETAMINE ABUSE (HCC): ICD-10-CM

## 2025-01-20 DIAGNOSIS — R45.851 SUICIDAL IDEATION: ICD-10-CM

## 2025-01-20 LAB
ALBUMIN SERPL BCP-MCNC: 4.1 G/DL (ref 3.2–4.9)
ALBUMIN/GLOB SERPL: 1.7 G/DL
ALP SERPL-CCNC: 128 U/L (ref 30–99)
ALT SERPL-CCNC: 17 U/L (ref 2–50)
AMPHET UR QL SCN: POSITIVE
ANION GAP SERPL CALC-SCNC: 12 MMOL/L (ref 7–16)
AST SERPL-CCNC: 28 U/L (ref 12–45)
BARBITURATES UR QL SCN: NEGATIVE
BASOPHILS # BLD AUTO: 0.9 % (ref 0–1.8)
BASOPHILS # BLD: 0.04 K/UL (ref 0–0.12)
BENZODIAZ UR QL SCN: POSITIVE
BILIRUB SERPL-MCNC: 0.3 MG/DL (ref 0.1–1.5)
BUN SERPL-MCNC: 15 MG/DL (ref 8–22)
BZE UR QL SCN: NEGATIVE
CALCIUM ALBUM COR SERPL-MCNC: 9 MG/DL (ref 8.5–10.5)
CALCIUM SERPL-MCNC: 9.1 MG/DL (ref 8.4–10.2)
CANNABINOIDS UR QL SCN: NEGATIVE
CHLORIDE SERPL-SCNC: 102 MMOL/L (ref 96–112)
CO2 SERPL-SCNC: 24 MMOL/L (ref 20–33)
CREAT SERPL-MCNC: 0.97 MG/DL (ref 0.5–1.4)
EKG IMPRESSION: NORMAL
EOSINOPHIL # BLD AUTO: 0.09 K/UL (ref 0–0.51)
EOSINOPHIL NFR BLD: 2 % (ref 0–6.9)
ERYTHROCYTE [DISTWIDTH] IN BLOOD BY AUTOMATED COUNT: 42.7 FL (ref 35.9–50)
FENTANYL UR QL: NEGATIVE
GFR SERPLBLD CREATININE-BSD FMLA CKD-EPI: 104 ML/MIN/1.73 M 2
GLOBULIN SER CALC-MCNC: 2.4 G/DL (ref 1.9–3.5)
GLUCOSE SERPL-MCNC: 112 MG/DL (ref 65–99)
HCT VFR BLD AUTO: 43 % (ref 42–52)
HGB BLD-MCNC: 14.5 G/DL (ref 14–18)
IMM GRANULOCYTES # BLD AUTO: 0.01 K/UL (ref 0–0.11)
IMM GRANULOCYTES NFR BLD AUTO: 0.2 % (ref 0–0.9)
LIPASE SERPL-CCNC: 50 U/L (ref 11–82)
LYMPHOCYTES # BLD AUTO: 1.44 K/UL (ref 1–4.8)
LYMPHOCYTES NFR BLD: 31.2 % (ref 22–41)
MCH RBC QN AUTO: 29.7 PG (ref 27–33)
MCHC RBC AUTO-ENTMCNC: 33.7 G/DL (ref 32.3–36.5)
MCV RBC AUTO: 88.1 FL (ref 81.4–97.8)
METHADONE UR QL SCN: NEGATIVE
MONOCYTES # BLD AUTO: 0.4 K/UL (ref 0–0.85)
MONOCYTES NFR BLD AUTO: 8.7 % (ref 0–13.4)
NEUTROPHILS # BLD AUTO: 2.63 K/UL (ref 1.82–7.42)
NEUTROPHILS NFR BLD: 57 % (ref 44–72)
NRBC # BLD AUTO: 0 K/UL
NRBC BLD-RTO: 0 /100 WBC (ref 0–0.2)
OPIATES UR QL SCN: NEGATIVE
OXYCODONE UR QL SCN: NEGATIVE
PCP UR QL SCN: NEGATIVE
PLATELET # BLD AUTO: 266 K/UL (ref 164–446)
PMV BLD AUTO: 9.9 FL (ref 9–12.9)
POC BREATHALIZER: 0 PERCENT (ref 0–0.01)
POC BREATHALIZER: 0 PERCENT (ref 0–0.01)
POTASSIUM SERPL-SCNC: 3.8 MMOL/L (ref 3.6–5.5)
PROPOXYPH UR QL SCN: NEGATIVE
PROT SERPL-MCNC: 6.5 G/DL (ref 6–8.2)
RBC # BLD AUTO: 4.88 M/UL (ref 4.7–6.1)
SODIUM SERPL-SCNC: 138 MMOL/L (ref 135–145)
TROPONIN T SERPL-MCNC: 10 NG/L (ref 6–19)
WBC # BLD AUTO: 4.6 K/UL (ref 4.8–10.8)

## 2025-01-20 PROCEDURE — 99284 EMERGENCY DEPT VISIT MOD MDM: CPT

## 2025-01-20 PROCEDURE — 80053 COMPREHEN METABOLIC PANEL: CPT

## 2025-01-20 PROCEDURE — 84484 ASSAY OF TROPONIN QUANT: CPT

## 2025-01-20 PROCEDURE — 302970 POC BREATHALIZER

## 2025-01-20 PROCEDURE — 83690 ASSAY OF LIPASE: CPT

## 2025-01-20 PROCEDURE — 36415 COLL VENOUS BLD VENIPUNCTURE: CPT

## 2025-01-20 PROCEDURE — 80307 DRUG TEST PRSMV CHEM ANLYZR: CPT

## 2025-01-20 PROCEDURE — 302970 POC BREATHALIZER: Performed by: EMERGENCY MEDICINE

## 2025-01-20 PROCEDURE — 85025 COMPLETE CBC W/AUTO DIFF WBC: CPT

## 2025-01-20 PROCEDURE — 99285 EMERGENCY DEPT VISIT HI MDM: CPT

## 2025-01-20 PROCEDURE — 93005 ELECTROCARDIOGRAM TRACING: CPT | Mod: TC

## 2025-01-20 PROCEDURE — 71045 X-RAY EXAM CHEST 1 VIEW: CPT

## 2025-01-20 ASSESSMENT — FIBROSIS 4 INDEX
FIB4 SCORE: 1.55
FIB4 SCORE: 1.55

## 2025-01-20 ASSESSMENT — HEART SCORE
ECG: NORMAL
RISK FACTORS: 1-2 RISK FACTORS
HISTORY: SLIGHTLY SUSPICIOUS
AGE: <45
HEART SCORE: 1
TROPONIN: LESS THAN OR EQUAL TO NORMAL LIMIT

## 2025-01-20 NOTE — CONSULTS
[facility]   TRIAGE ASSESSMENT    Name: Abdoul Medina  MRN: [id number]  : 1990  Age: 34 y.o.  Date of assessment: 2025  PCP: Pcp Pt States None  Persons in attendance: Patient  Patient Location: [facility]    CHIEF COMPLAINT/PRESENTING ISSUE (as stated by patient):   This evaluation was conducted via zoom using secure and encrypted video conferencing technology. The patient was located at [facility] and the Alert Team RN /Psychologist was located at [facility]. The patients identity was confirmed and verbal consent for the telemedicine conference was obtatained.      This is pt's third ED visit in the past 24 hours. He initially came in denying SI/HI and requesting addiction treatment resources and was sent for a walk-in assessment at [facility]. He arrived at [facility] and began to complain of chest pain and was sent to the ED again. At this time, he stated that he was suicidal and homicidal and had a gun with hollow point bullets in his car that he would use to kill the bad people and kill himself. He reports a history of schizophrenia, bipolar, depression. He has been noncompliant with psychiatric treatment for at least a month. He states he would like to detox from alcohol, meth and fentanyl so that he can go to the [facility] for addiction treatment.  Unable to contract for safety. He is medically cleared and ready for transfer to inpatient psychiatry.   Chief Complaint   Patient presents with    Suicidal Ideation     States he will go to his friends house and get his gun and shoot himself. No legal hold at this time.   Pt seen at [facility] this am and sent to [facility], [facility] sent him here for medical clearance.      Chest Pain     CP and SOB, hx of anxiety, detoxing from meth and fentanyl and a gallon of whiskey.          CURRENT LIVING SITUATION/SOCIAL SUPPORT/FINANCIAL RESOURCES: Chronic homelessness, nominal social supports. Does report  having a car.     BEHAVIORAL HEALTH/SUBSTANCE USE TREATMENT HISTORY  Does patient/parent report a history of prior behavioral health/substance use treatment for patient?   Dates Level of Care Facilty/Provider Diagnosis/Problem Medications   2023 x 6   2022 x 3 IP [facility] Bipolar affective disorder, current episode depressed.Opiate dependence. Alcohol dependence. Methamphetamine abuse.   Zyprexa 10 [facility]                  Multiple admissions IP [facility]                   2012 x 2 IP [facility]           SAFETY ASSESSMENT - SELF  Does patient acknowledge current or past symptoms of dangerousness to self or is previous history noted? yes  Does parent/significant other report patient has current or past symptoms of dangerousness to self? N\A  Does presenting problem suggest symptoms of dangerousness to self? Yes:     Past Current    Suicidal Thoughts: [x]  [x]    Suicidal Plans: [x]  [x]    Suicidal Intent: [x]  [x]    Suicide Attempts: []  []    Self-Injury []  []      For any boxes checked above, provide detail: He has reported on multiple occasions that he is suicidal with a plan to shoot himself or that he couldn't find his gun so he tried to overdose on meth, fentanyl and alcohol. This time, he states that he wants to shoot himself.   Recent change in frequency/specificity/intensity of suicidal thoughts or self-harm behavior? yes   Current access to firearms, medications, or other identified means of suicide/self-harm? yes   If yes, willing to restrict access to means of suicide/self-harm? No, pt states he has a gun in his car with hollow point bullets that he plans to shoot himself with. He states the gun is in his car. Refuses to disclose the location of the car.  Protective factors present:  Future-oriented and Hopefulness    SAFETY ASSESSMENT - OTHERS  Does patient acknowledge current or past symptoms of aggressive behavior or risk to others or is previous history noted? yes  Does parent/significant other report  "patient has current or past symptoms of aggressive behavior or risk to others?  N\A  Does presenting problem suggest symptoms of dangerousness to others? Yes:    History Current   Thoughts of injuring others? []  [x]    Threats to injure others? []  []    Plan to injure others? []  [x]    Intent to injure others? []  []    Has injured others? []  []    Thoughts of killing others? []  []    Threats to kill others? []  []    Plans to kill others? []  []    Intent to kill others? []  []    Has killed others? []  []    Perpetrator of sexual assault? []  []    Family history of homicide? []  []      For any boxes checked above, please provide detail: states he has a gun and wants to kill \"the bad people that are after me.\" Denies specific target or intent.    Recent change in frequency/specificity/intensity of thoughts or threats to harm others? yes   Current access to firearms/other identified means of harm? yes  If yes, willing to restrict access to weapons/means of harm? no  Protective factors present: Willing to address in treatment  Based on information provided during the current assessment, is a mandated “duty to warn” being exercised? Yes:  Date/time of report/notification: 1/20/24 Although pt did not identify specific targets he wanted to harm, he did report having a gun in his car with hollow point bullets. Pt described having a 2004 silver Subaru Outback parked near the [place] [address]. Called to [employer] to describe location/description of vehicle.   Agency: [place] [employer]  Person spoken to: unk  Reported by: Nany Caal RN    LEGAL HISTORY  Does patient acknowledge history of arrest/half-way/snf or is previous history noted? Yes, history of being in mental health court    Crisis Safety Plan completed and copy given to patient? N\A    ABUSE/NEGLECT SCREENING  Does patient report feeling “unsafe” in his/her home, or afraid of anyone?  no  Does patient report any history of physical, sexual, or emotional abuse?  " "no  Does parent or significant other report any of the above? N\A  Is there evidence of neglect by self?  no  Is there evidence of neglect by a caregiver? no  Does the patient/parent report any history of CPS/APS/police involvement related to suspected abuse/neglect or domestic violence? no  Based on the information provided during the current assessment, is a mandated report of suspected abuse/neglect being made?  No    SUBSTANCE USE SCREENING  Yes:  Greyson all substances used in the past 30 days:      Last Use Amount   [x]   Alcohol 1/19/25 Not disclosed   []   Marijuana     [x]   Fentanyl 1/19/25 Not disclosed   []   Prescription Opioids  (used without prescription, for    recreation, or in excess of prescribed amount)     []   Other Prescription  (used without prescription, for    recreation, or in excess of prescribed amount)     []   Cocaine      [x]   Methamphetamine 1/19/25 Not disclosed   []   \"\" drugs (ectasy, MDMA)     []   Other substances        UDS results: positive for meth and benzos  Breathalyzer results: 0.00    What consequences does the patient associate with any of the above substance use and or addictive behaviors? Legal, health, housing, relationships    Risk factors for detox (check all that apply):  [x]  Seizures   [x]  Diaphoretic (sweating)   [x]  Tremors   [x]  Hallucinations   [x]  Increased blood pressure   [x]  Decreased blood pressure   [x]  Other   []  None      [x] Patient education on risk factors for detoxification and instructed to return to ER as needed.      MENTAL STATUS   Participation: Active verbal participation, Engaged, and Defensive  Grooming:  poor, malodorous  Orientation: Alert and Fully Oriented  Behavior: Calm  Eye contact: Poor  Mood: Angry and Irritable  Affect: Angry  Thought process: Logical and Goal-directed  Thought content: Within normal limits  Speech: Volume within normal limits and Loud  Perception: Within normal limits  Memory:  Poor memory for " chronology of events  Insight: Poor  Judgment:  Poor  Other:    Collateral information:    Source:  [] Significant other present in person:   [] Significant other by telephone  [] [facility]   [] [facility] Nursing Staff  [x] [facility] Medical Record  [] Other:     [] Unable to complete full assessment due to:  [] Acute intoxication  [] Patient declined to participate/engage  [] Patient verbally unresponsive  [] Significant cognitive deficits  [] Significant perceptual distortions or behavioral disorganization  [] Other:      CLINICAL IMPRESSIONS:  Primary: polysubstance use disorder  Secondary:   chronic homelessness, limited social resources       IDENTIFIED NEEDS/PLAN:  [Trigger DISPOSITION list for any items marked]    [x]  Imminent safety risk - self [x] Imminent safety risk - others   [x]  Acute substance withdrawal []  Psychosis/Impaired reality testing   []  Mood/anxiety [x]  Substance use/Addictive behavior   [x]  Maladaptive behaviro []  Parent/child conflict   []  Family/Couples conflict []  Biomedical   [x]  Housing [x]  Financial   []   Legal  Occupational/Educational   []  Domestic violence []  Other:     Recommended Plan of Care:  Actively being addressed by [facility] and [facility] Emergency Department, Refer to [facility], [facility], and [facility], and 1:1 Observation with security, no personal phone, no visitors, no personal belongings  *Telesitter may not be utilized for moderate or high risk patients    Has the Recommended Plan of Care/Level of Observation been reviewed with the patient's assigned nurse? yes    Does patient/parent or guardian express agreement with the above plan? yes      Referral appointment(s) scheduled? N\A    Alert team only:   I have discussed findings and recommendations with Dr. Infante who is in agreement with these recommendations.     Referral information sent to the following outpatient community providers :    Referral information  sent to the following inpatient community providers : [facility], [facility], [facility]    If applicable : Referred  to  Alert Team for legal hold follow up at (time): 1/23/25      Nany Caal R.N.  1/20/2025

## 2025-01-20 NOTE — ED NOTES
Medication history reviewed with pt. Med rec is complete.  Allergies reviewed, per pt    Pt reports no prescription medications, OTC's, or vitamins in the last 30 days or longer.  Pt reports that he has not taken his HYDROXYZINE 50MG, ESCITALOPRAM 10MG, TRAZODONE 50MG, or QUETIAPINE 100MG that was prescribed to him on 1/8/2025    Patient has not had any outpatient antibiotics in the last 30 days.    Pt is not on any anticoagulants

## 2025-01-20 NOTE — ED TRIAGE NOTES
"Chief Complaint   Patient presents with    Suicidal Ideation     States he will go to his friends house and get his gun and shoot himself. No legal hold at this time.   Pt seen at [facility] this am and sent to [facility], [facility] sent him here for medical clearance.      Chest Pain     CP and SOB, hx of anxiety, detoxing from meth and fentanyl and a gallon of whiskey.       Ht 1.753 m (5' 9\")   Wt 83.9 kg (185 lb)   BMI 27.32 kg/m²         "

## 2025-01-20 NOTE — ED NOTES
Pt placed in RM 11 room safe for pt's SI thoughts  and legal hold   placed in blue suit safety will be monitored by sitter outside room in direct obs status   pt cooperative and calm   food to be given per his request

## 2025-01-20 NOTE — ED NOTES
Pt to be given [employer] for lunch   voided UA sent to lab  pt w/ sitter due to being legal 2000 placed

## 2025-01-20 NOTE — ED NOTES
Patient discharged per order. Oral and written discharge instructions reviewed. All belongings accounted for and taken with patient. Questions answered, and patient agrees with discharge plan. Encouraged to follow up with PCP. Taxi called, patient going to [facility], belongings back to patient. Dressed, and ambulatory to [facility]

## 2025-01-20 NOTE — ED TRIAGE NOTES
"Chief Complaint   Patient presents with    Suicidal Ideation     \"I am off my meds and I killed myself with meth and alcohol in the last 24 hrs. I drank a gallon a whiskey\"        /88   Pulse 94   Temp 35.8 °C (96.5 °F) (Temporal)   Resp 18   Ht 1.753 m (5' 9\")   Wt 83.9 kg (185 lb)   SpO2 96%   BMI 27.32 kg/m²     Robbie SEVILLA made aware. Pt room immediatly   "

## 2025-01-20 NOTE — ED NOTES
Pt has been sleeping soundly on gurney   noted chest rising and falling  will be evaluated by Behavioral health

## 2025-01-20 NOTE — DISCHARGE PLANNING
Alert Team:  Patient reported he is interested in going to [facility] for voluntary detox from alcohol and meth. Patient provided with community resources for mental health and substance abuse.   Patient provided with a taxi voucher to [facility] [id number].

## 2025-01-20 NOTE — ED PROVIDER NOTES
"ED Provider Note    CHIEF COMPLAINT  Chief Complaint   Patient presents with    Suicidal Ideation     \"I am off my meds and I killed myself with meth and alcohol in the last 24 hrs. I drank a gallon a whiskey\"          EXTERNAL RECORDS REVIEWED  External ED Note patient seen at [facility] on 12 December for polysubstance abuse    HPI/ROS  LIMITATION TO HISTORY   Select: : None  OUTSIDE HISTORIAN(S):  none    Abdoul Medina is a 34 y.o. male who presents with feeling depressed.  He said last night he was drinking lots of alcohol and used fentanyl as well as methamphetamines.  At the time he was not doing this precisely to die but was feeling really down.  He denies any suicidal ideation to me at this time.  He says he wants help with his alcohol and drug use.  He says he wants to be seen at [facility].  Denies any nausea, vomiting or diarrhea.  Says he has been drinking heavily and using drugs heavily.  Feels remorseful about this and wants help for his life    PAST MEDICAL HISTORY   has a past medical history of Acne (4/15/2013), Alcohol ingestion, more than 4 drinks/day (3/4/2016), Psychiatric disorder, and Warts (1/22/2013).    SURGICAL HISTORY   has a past surgical history that includes orif, fracture, clavicle (Left).    FAMILY HISTORY  Family History   Problem Relation Age of Onset    Cancer Neg Hx     Diabetes Neg Hx     Heart Disease Neg Hx        SOCIAL HISTORY  Social History     Tobacco Use    Smoking status: Never    Smokeless tobacco: Never   Vaping Use    Vaping status: Never Used   Substance and Sexual Activity    Alcohol use: Yes     Comment: 1 gallon of whisky about everyday    Drug use: Yes     Types: Inhaled     Comment: marijuana, Meth(smoke only no injection ), fentanyl    Sexual activity: Not on file       CURRENT MEDICATIONS  Home Medications       Reviewed by Luci Diggs R.N. (Registered Nurse) on 01/20/25 at 0719  Med List Status: Not Addressed     Medication Last " "Dose Status   olanzapine (ZYPREXA) 10 MG tablet  Active   traZODone (DESYREL) 100 MG Tab  Active                    ALLERGIES  No Known Allergies    PHYSICAL EXAM  VITAL SIGNS: /88   Pulse 94   Temp 35.8 °C (96.5 °F) (Temporal)   Resp 18   Ht 1.753 m (5' 9\")   Wt 83.9 kg (185 lb)   SpO2 96%   BMI 27.32 kg/m²    Constitutional: Awake and alert. Generally nontoxic  HENT: Normocephalic, Atraumatic, Bilateral external ears normal, Oropharynx moist, No oral exudates, Nose normal.   Eyes: PERRL, Conjunctiva normal, No discharge.   Neck: Normal range of motion, No tenderness, Supple, No stridor.   Cardiovascular: Normal heart rate, Normal rhythm  Thorax & Lungs: Normal breath sounds, No respiratory distress, No wheezing, No chest tenderness.  Abdomen: Bowel sounds normal, Soft, No tenderness, No masses, No pulsatile masses.    Skin:  No pathologic rash.   Extremities: No clubbing, cyanosis, or edema there, odor of feet and appears unkempt in the lower extremities  Neuro/Psychiatric: Somewhat tearful.  Denies suicidal ideation to me.  Says he wants help.  Somewhat pressured in speech.      EKG/LABS  Labs Reviewed   POC BREATHALIZER - Normal   URINE DRUG SCREEN       COURSE & MEDICAL DECISION MAKING    ASSESSMENT, COURSE AND PLAN  Care Narrative: Patient with what appears to be depression and significant drug abuse.  At this time I will counseled the patient on his symptoms.  He does have a negative breathalyzer.  I will have behavioral health come see the patient.  He does not need to be on a legal hold.  Will give resources.  Will reassess after this.    7:51 AM - Patient seen and examined at bedside.     8:41 AM - I reevaluated the patient at bedside. I discussed plan for discharge and follow up as outlined below. The patient is stable for discharge at this time and will return for any new or worsening symptoms. Patient verbalizes understanding and support with my plan for discharge.     ADDITIONAL PROBLEMS " MANAGED  Patient he was now no longer feeling suicidal.  He says he been using drugs and alcohol and wants help with this.  At this time he is no longer intoxicated.  I did discuss methamphetamine abuse as well as alcohol abuse with him.  Behavioral health consulted.  We will discharge the patient home with strict return precaution follow-up.    DISPOSITION AND DISCUSSIONS  I have discussed management of the patient with the following physicians and SARAH's:  None    Discussion of management with other [facility] or appropriate source(s): Behavioral Health        Escalation of care considered, and ultimately not performed:Laboratory analysis    Barriers to care at this time, including but not limited to: Patient does not have established PCP.     Decision tools and prescription drugs considered including, but not limited to:  Depression discussion was had with the patient. .    The patient will return for new or worsening symptoms and is stable at the time of discharge.    DISPOSITION:  Patient will be discharged home in stable condition.    FOLLOW UP:  [facility]  [address] [facility] [address]  [phone]          OUTPATIENT MEDICATIONS:  New Prescriptions    No medications on file       FINAL DIAGNOSIS  1. Depression, unspecified depression type    2. Methamphetamine abuse (HCC)         Electronically signed by: Evan Huynh M.D., 1/20/2025 7:55 AM     Pili PERALTA (Aubrey), am scribing for, and in the presence of, Evan Huynh M.D..    Electronically signed by: Pili Carson), 1/20/2025    Evan PERALTA M.D. personally performed the services described in this documentation, as scribed by Pili Guidry in my presence, and it is both accurate and complete.

## 2025-01-20 NOTE — ED PROVIDER NOTES
ED PHYSICIAN NOTE    CHIEF COMPLAINT  Chief Complaint   Patient presents with    Suicidal Ideation     States he will go to his friends house and get his gun and shoot himself. No legal hold at this time.   Pt seen at [facility] this am and sent to [facility], [facility] sent him here for medical clearance.      Chest Pain     CP and SOB, hx of anxiety, detoxing from meth and fentanyl and a gallon of whiskey.         EXTERNAL RECORDS REVIEWED  Patient was seen at [facility] this morning.  Referred to [facility] for detox.    HPI/ROS    OUTSIDE HISTORIAN(S):  EMS assisted with history    Abdoul Medina is a 34 y.o. male who presents to the emergency department being referred from [facility].  Patient has a history of substance abuse including methamphetamine and alcohol.  He says that he drinks so much and use so much methamphetamine that he .  Somehow he ended up waking up from this and took himself to [facility].  Arrangements were made for him to go to [facility].  He went to [facility].  He reported he was having chest pain and was referred here for evaluation.  Patient says that he is extremely hungry and thirsty and has not eaten or drink anything in 48 hours.  He tells me that he cannot get off alcohol or meth he will die.  He does not think that he would kill himself, but he told the nurse that he would go to a friend's house get a gun and kill himself.  With regards to the patient chest pain.  No pleuritic pain.  He says he had some vomiting yesterday.  No abdominal pain.  No leg swelling, leg pain.  No fevers or recent illness.    PAST MEDICAL HISTORY  Past Medical History:   Diagnosis Date    Acne 4/15/2013    Alcohol ingestion, more than 4 drinks/day 3/4/2016    Psychiatric disorder     bipolar, schizophrenia, chronic depression    Warts 2013       SOCIAL HISTORY  Social History     Tobacco Use    Smoking status: Never    Smokeless  "tobacco: Never   Vaping Use    Vaping status: Never Used   Substance Use Topics    Alcohol use: Yes     Comment: 1 gallon of whisky about everyday    Drug use: Yes     Types: Inhaled     Comment: marijuana, Meth(smoke only no injection ), fentanyl       CURRENT MEDICATIONS  Home Medications       Reviewed by Cirilo Mckinnon (Pharmacy Tech) on 01/20/25 at 1255  Med List Status: Complete     Medication Last Dose Status        Patient Michael Taking any Medications                         Audit from Redirected Encounters    **Home medications have not yet been reviewed for this encounter**         ALLERGIES  No Known Allergies    PHYSICAL EXAM  VITAL SIGNS: Ht 1.753 m (5' 9\")   Wt 83.9 kg (185 lb)   BMI 27.32 kg/m²    Constitutional: Awake and alert.  Anxious tremulous  HENT: Normal inspection  Eyes: Normal inspection  Neck: Grossly normal range of motion.  Cardiovascular: Normal heart rate, Normal rhythm.  Symmetric peripheral pulses.   Thorax & Lungs: No respiratory distress, No wheezing, No rales, No rhonchi, No chest tenderness.   Abdomen: Bowel sounds normal, soft, non-distended, nontender, no mass  Skin: No pathologic rash  Extremities: No asymmetric swelling  Neurologic: Grossly normal   Psychiatric: Anxious.  Denies hearing voices    DIAGNOSTIC STUDIES / PROCEDURES  LABS/EKG  Results for orders placed or performed during the hospital encounter of 01/20/25   CBC WITH DIFFERENTIAL    Collection Time: 01/20/25 11:49 AM   Result Value Ref Range    WBC 4.6 (L) 4.8 - 10.8 K/uL    RBC 4.88 4.70 - 6.10 M/uL    Hemoglobin 14.5 14.0 - 18.0 g/dL    Hematocrit 43.0 42.0 - 52.0 %    MCV 88.1 81.4 - 97.8 fL    MCH 29.7 27.0 - 33.0 pg    MCHC 33.7 32.3 - 36.5 g/dL    RDW 42.7 35.9 - 50.0 fL    Platelet Count 266 164 - 446 K/uL    MPV 9.9 9.0 - 12.9 fL    Neutrophils-Polys 57.00 44.00 - 72.00 %    Lymphocytes 31.20 22.00 - 41.00 %    Monocytes 8.70 0.00 - 13.40 %    Eosinophils 2.00 0.00 - 6.90 %    Basophils 0.90 0.00 " - 1.80 %    Immature Granulocytes 0.20 0.00 - 0.90 %    Nucleated RBC 0.00 0.00 - 0.20 /100 WBC    Neutrophils (Absolute) 2.63 1.82 - 7.42 K/uL    Lymphs (Absolute) 1.44 1.00 - 4.80 K/uL    Monos (Absolute) 0.40 0.00 - 0.85 K/uL    Eos (Absolute) 0.09 0.00 - 0.51 K/uL    Baso (Absolute) 0.04 0.00 - 0.12 K/uL    Immature Granulocytes (abs) 0.01 0.00 - 0.11 K/uL    NRBC (Absolute) 0.00 K/uL   COMP METABOLIC PANEL    Collection Time: 01/20/25 11:49 AM   Result Value Ref Range    Sodium 138 135 - 145 mmol/L    Potassium 3.8 3.6 - 5.5 mmol/L    Chloride 102 96 - 112 mmol/L    Co2 24 20 - 33 mmol/L    Anion Gap 12.0 7.0 - 16.0    Glucose 112 (H) 65 - 99 mg/dL    Bun 15 8 - 22 mg/dL    Creatinine 0.97 0.50 - 1.40 mg/dL    Calcium 9.1 8.4 - 10.2 mg/dL    Correct Calcium 9.0 8.5 - 10.5 mg/dL    AST(SGOT) 28 12 - 45 U/L    ALT(SGPT) 17 2 - 50 U/L    Alkaline Phosphatase 128 (H) 30 - 99 U/L    Total Bilirubin 0.3 0.1 - 1.5 mg/dL    Albumin 4.1 3.2 - 4.9 g/dL    Total Protein 6.5 6.0 - 8.2 g/dL    Globulin 2.4 1.9 - 3.5 g/dL    A-G Ratio 1.7 g/dL   TROPONIN    Collection Time: 01/20/25 11:49 AM   Result Value Ref Range    Troponin T 10 6 - 19 ng/L   LIPASE    Collection Time: 01/20/25 11:49 AM   Result Value Ref Range    Lipase 50 11 - 82 U/L   ESTIMATED GFR    Collection Time: 01/20/25 11:49 AM   Result Value Ref Range    GFR (CKD-EPI) 104 >60 mL/min/1.73 m 2   POC BREATHALIZER    Collection Time: 01/20/25 12:42 PM   Result Value Ref Range    POC Breathalizer 0.000 0.00 - 0.01 Percent   URINE DRUG SCREEN    Collection Time: 01/20/25 12:54 PM   Result Value Ref Range    Amphetamines Urine Positive (A) Negative    Barbiturates Negative Negative    Benzodiazepines Positive (A) Negative    Cocaine Metabolite Negative Negative    Fentanyl, Urine Negative Negative    Methadone Negative Negative    Opiates Negative Negative    Oxycodone Negative Negative    Phencyclidine -Pcp Negative Negative    Propoxyphene Negative Negative     Cannabinoid Metab Negative Negative   EKG    Collection Time: 25  4:20 PM   Result Value Ref Range    Report       [facility] Emergency Dept[facility]    Test Date:  2025  Pt Name:    PRECIOUS DE LUNA                  Department: [facility]  MRN:        [id number]                      [facility]  Gender:     Male                         Technician: [id number]  :        1990                   Requested By:ER TRIAGE PROTOCOL  Order #:    [id number]                    Reading MD: JAILENE TURNER MD    Measurements  Intervals                                Axis  Rate:       66                           P:          57  NH:         141                          QRS:        64  QRSD:       94                           T:          52  QT:         407  QTc:        427    Interpretive Statements  Sinus rhythm  Compared to ECG 2025 03:03:58  No significant changes  Electronically Signed On 2025 16:20:51 PST by JAILENE TURNER MD        I have independently interpreted this EKG as documented above    Rhythm strip interpretation-sinus rhythm    RADIOLOGY  I have independently interpreted the diagnostic imaging associated with this visit and am waiting the final reading from the radiologist.   My preliminary interpretation is as follows: Chest x-ray without infiltrate    COURSE & MEDICAL DECISION MAKING    INITIAL ASSESSMENT, COURSE AND PLAN  Case narrative: Patient presents with suicidal ideation, but also reported that he felt chest pain.  Medical evaluation is undertaken.  There is no clinical suggestion of dissection or or pulmonary embolism.  He reported he was very hungry and thirsty.  EKG does not show acute ischemia.  Patient was given food.    Laboratory data returns unremarkable.  He reports he has had chest pain since yesterday.  Given constant symptoms negative troponin this is not an acute MI.  His symptoms resolved after eating.    The patient reported that he was suicidal.   He says that he has a gun in his car.  He was placed on a legal hold.  He was seen by behavioral health who agrees with legal hold and emergent psychiatric care.            HEART Score for Major Cardiac Events  HEART Score     History: Slightly suspicious  ECG: Normal  Age: <45  Risk Factors: 1-2 risk factors  Troponin: Less than or equal to normal limit    Heart Score: 1    Total Score   0-3 Points = Low Score, risk of MACE 0.9-1.7%.  4-6 Points = Moderate Score, risk of MACE 12-16.6%  7-10 Points = High Score, risk of MACE 50-65%        DISPOSITION AND DISCUSSIONS    Discussion of management with other QHP or appropriate source(s): Behavioral Health        FINAL IMPRESSION  1.  Suicidal ideation  2.  Chest pain  3.  Polysubstance abuse    This dictation was created using voice recognition software. The accuracy of the dictation is limited to the abilities of the software. I expect there may be some errors of grammar and possibly content. The nursing notes were reviewed and certain aspects of this information were incorporated into this note.    Electronically signed by: Alton Infante M.D., 1/20/2025

## 2025-01-21 NOTE — ED NOTES
Report from ROEL Wilson   1:1 sitter and security in place within direct view of patient. Pt eating regular diet dinner tray. Rounded on pt. POC explained to pt, denies needs or complaints at this time.

## 2025-01-21 NOTE — DISCHARGE PLANNING
[facility] ED Behavioral Health Fax Referral      Referral: Legal Hold    Intervention: Patient referral to community inpatient  facillity    Legal Hold Initiated: Date: 1/20/25 Time:  1200    Patient’s Insurance Listed on Face Sheet: [place] [employer]    Referrals sent to:  [facility], [facility], [facility]     Referrals faxed by Nany DURBIN    This referral contains the following information:  Face sheet __x__  Page 1 and Page 2 of Legal Hold __x__  Alert Team Assessment/Psych Assessment _x___  Head to toe physical exam __x__  Urine Drug Screen _x___  Blood Alcohol __x__  Vital signs _x___  Pregnancy test when applicable ___  Medications list _x___  Covid screening ____    Plan: Patient will transfer to mental health facility once acceptance is obtained    For all referral information, please contact the  referral office daily between the hours of 7:00 a.m. to 6:00 p.m. at:   Phone [phone]   Fax [phone]    ED  Alert Team   Phone [phone] Fax [phone]    [facility] Emergency Department Contact numbers:  Green Pod [phone]   Blue Pod [phone]   Red Pod [phone]   Pediatrics [phone]

## 2025-01-21 NOTE — DISCHARGE SUMMARY
"  ED Observation Discharge Summary    Patient:Abdoul Medina  Patient : 1990  Patient MRN: [id number]  Patient PCP: Pcp Pt States None    Admit Date: 2025  Discharge Date and Time: 25 6:36 PM  Discharge Diagnosis: Depression, substance use  Discharge Attending: Clay Magallon M.D.  Discharge Service: ED Observation    ED Course  Abdoul is a 34 y.o. male who was evaluated at [facility] for suicidal ideation in the setting of substance abuse and chronic meth and alcohol addiction.  After going on a very large brice of alcohol and methamphetamine the patient checked himself into the hospital reporting suicidal ideations with a plan to take a friend's gun and shoot himself.  He was placed on a legal hold and ultimately transferred to [facility] for further stabilization and detox.    Discharge Exam:  /85   Pulse 64   Temp 36.8 °C (98.2 °F) (Temporal)   Resp 20   Ht 1.753 m (5' 9\")   Wt 83.9 kg (185 lb)   SpO2 98%   BMI 27.32 kg/m² .    Constitutional: Awake and alert. Nontoxic  HENT:  Grossly normal  Eyes: Grossly normal  Neck: Normal range of motion  Cardiovascular: Normal heart rate   Thorax & Lungs: No respiratory distress  Abdomen: Nontender  Skin:  No pathologic rash.   Extremities: Well perfused  Psychiatric: Affect normal    Labs  Results for orders placed or performed during the hospital encounter of 25   CBC WITH DIFFERENTIAL    Collection Time: 25 11:49 AM   Result Value Ref Range    WBC 4.6 (L) 4.8 - 10.8 K/uL    RBC 4.88 4.70 - 6.10 M/uL    Hemoglobin 14.5 14.0 - 18.0 g/dL    Hematocrit 43.0 42.0 - 52.0 %    MCV 88.1 81.4 - 97.8 fL    MCH 29.7 27.0 - 33.0 pg    MCHC 33.7 32.3 - 36.5 g/dL    RDW 42.7 35.9 - 50.0 fL    Platelet Count 266 164 - 446 K/uL    MPV 9.9 9.0 - 12.9 fL    Neutrophils-Polys 57.00 44.00 - 72.00 %    Lymphocytes 31.20 22.00 - 41.00 %    Monocytes 8.70 0.00 - 13.40 %    Eosinophils 2.00 0.00 - 6.90 %    Basophils 0.90 0.00 - 1.80 %    " Immature Granulocytes 0.20 0.00 - 0.90 %    Nucleated RBC 0.00 0.00 - 0.20 /100 WBC    Neutrophils (Absolute) 2.63 1.82 - 7.42 K/uL    Lymphs (Absolute) 1.44 1.00 - 4.80 K/uL    Monos (Absolute) 0.40 0.00 - 0.85 K/uL    Eos (Absolute) 0.09 0.00 - 0.51 K/uL    Baso (Absolute) 0.04 0.00 - 0.12 K/uL    Immature Granulocytes (abs) 0.01 0.00 - 0.11 K/uL    NRBC (Absolute) 0.00 K/uL   COMP METABOLIC PANEL    Collection Time: 01/20/25 11:49 AM   Result Value Ref Range    Sodium 138 135 - 145 mmol/L    Potassium 3.8 3.6 - 5.5 mmol/L    Chloride 102 96 - 112 mmol/L    Co2 24 20 - 33 mmol/L    Anion Gap 12.0 7.0 - 16.0    Glucose 112 (H) 65 - 99 mg/dL    Bun 15 8 - 22 mg/dL    Creatinine 0.97 0.50 - 1.40 mg/dL    Calcium 9.1 8.4 - 10.2 mg/dL    Correct Calcium 9.0 8.5 - 10.5 mg/dL    AST(SGOT) 28 12 - 45 U/L    ALT(SGPT) 17 2 - 50 U/L    Alkaline Phosphatase 128 (H) 30 - 99 U/L    Total Bilirubin 0.3 0.1 - 1.5 mg/dL    Albumin 4.1 3.2 - 4.9 g/dL    Total Protein 6.5 6.0 - 8.2 g/dL    Globulin 2.4 1.9 - 3.5 g/dL    A-G Ratio 1.7 g/dL   TROPONIN    Collection Time: 01/20/25 11:49 AM   Result Value Ref Range    Troponin T 10 6 - 19 ng/L   LIPASE    Collection Time: 01/20/25 11:49 AM   Result Value Ref Range    Lipase 50 11 - 82 U/L   ESTIMATED GFR    Collection Time: 01/20/25 11:49 AM   Result Value Ref Range    GFR (CKD-EPI) 104 >60 mL/min/1.73 m 2   POC BREATHALIZER    Collection Time: 01/20/25 12:42 PM   Result Value Ref Range    POC Breathalizer 0.000 0.00 - 0.01 Percent   URINE DRUG SCREEN    Collection Time: 01/20/25 12:54 PM   Result Value Ref Range    Amphetamines Urine Positive (A) Negative    Barbiturates Negative Negative    Benzodiazepines Positive (A) Negative    Cocaine Metabolite Negative Negative    Fentanyl, Urine Negative Negative    Methadone Negative Negative    Opiates Negative Negative    Oxycodone Negative Negative    Phencyclidine -Pcp Negative Negative    Propoxyphene Negative Negative    Cannabinoid  Metab Negative Negative   EKG    Collection Time: 25  4:20 PM   Result Value Ref Range    Report       [facility] Emergency Dept[facility]    Test Date:  2025  Pt Name:    PRECIOUS DE LUNA                  Department: [facility]  MRN:        [id number]                      Room:       [facility]  Gender:     Male                         Technician: [id number]  :        1990                   Requested By:ER TRIAGE PROTOCOL  Order #:    [id number]                    Reading MD: JAILENE TURNER MD    Measurements  Intervals                                Axis  Rate:       66                           P:          57  MA:         141                          QRS:        64  QRSD:       94                           T:          52  QT:         407  QTc:        427    Interpretive Statements  Sinus rhythm  Compared to ECG 2025 03:03:58  No significant changes  Electronically Signed On 2025 16:20:51 PST by JAILENE TURNER MD         Radiology  DX-CHEST-PORTABLE (1 VIEW)   Final Result      No definite acute cardiac or pulmonary abnormalities are identified.          Medications:   There are no discharge medications for this patient.      My final assessment includes polysubstance addiction, depression  Upon Reevaluation, the patient's condition has: not improved; and will be escalated to hospitalization.    Patient discharged from ED Observation status at 1919 (Time) 2025 (Date).     Total time spent on this ED Observation discharge encounter is < 30 Minutes    Electronically signed by: Clay Magallon M.D., 2025 6:36 PM

## 2025-01-21 NOTE — ED NOTES
Report to [facility] for transport to [facility]. Called [facility], spoke to ROEL Mccollum for report. Pt left with all belongings and original copy of legal hold.

## 2025-01-21 NOTE — DISCHARGE PLANNING
Legal Hold Transfer     Referral: Legal hold transfer to Mental Health Facility     Intervention: Transfer to [facility]     Pt's accepting physcian mercedes Connors     Transport arranged through  [facility]     The pt will be picked up at 2000      Notified Bedside RN, Nisha MIRZA, TRAVIS, and Dr. Magallon     Transfer packet to be created with original legal hold and placed on chart by Nisha MIRZA     Plan: transfer to [facility] at 2000

## 2025-01-21 NOTE — ED NOTES
After evaluation w/ [facility] pt states he is also homicidal  security now at [facility] as well as gene   pt remains in direct obs status

## 2025-02-04 ENCOUNTER — HOSPITAL ENCOUNTER (EMERGENCY)
Facility: MEDICAL CENTER | Age: 35
End: 2025-02-04
Attending: STUDENT IN AN ORGANIZED HEALTH CARE EDUCATION/TRAINING PROGRAM
Payer: COMMERCIAL

## 2025-02-04 VITALS
TEMPERATURE: 96.7 F | WEIGHT: 169.53 LBS | OXYGEN SATURATION: 95 % | HEART RATE: 84 BPM | HEIGHT: 69 IN | RESPIRATION RATE: 16 BRPM | BODY MASS INDEX: 25.11 KG/M2 | DIASTOLIC BLOOD PRESSURE: 72 MMHG | SYSTOLIC BLOOD PRESSURE: 126 MMHG

## 2025-02-04 DIAGNOSIS — T50.902A INTENTIONAL DRUG OVERDOSE, INITIAL ENCOUNTER (HCC): ICD-10-CM

## 2025-02-04 DIAGNOSIS — R45.851 SUICIDAL IDEATION: ICD-10-CM

## 2025-02-04 DIAGNOSIS — F15.929 METHAMPHETAMINE INTOXICATION (HCC): ICD-10-CM

## 2025-02-04 LAB — POC BREATHALIZER: 0 PERCENT (ref 0–0.01)

## 2025-02-04 PROCEDURE — 700111 HCHG RX REV CODE 636 W/ 250 OVERRIDE (IP): Mod: UD | Performed by: STUDENT IN AN ORGANIZED HEALTH CARE EDUCATION/TRAINING PROGRAM

## 2025-02-04 PROCEDURE — 99285 EMERGENCY DEPT VISIT HI MDM: CPT

## 2025-02-04 PROCEDURE — 700102 HCHG RX REV CODE 250 W/ 637 OVERRIDE(OP): Mod: UD | Performed by: STUDENT IN AN ORGANIZED HEALTH CARE EDUCATION/TRAINING PROGRAM

## 2025-02-04 PROCEDURE — 302970 POC BREATHALIZER: Performed by: STUDENT IN AN ORGANIZED HEALTH CARE EDUCATION/TRAINING PROGRAM

## 2025-02-04 PROCEDURE — 96372 THER/PROPH/DIAG INJ SC/IM: CPT

## 2025-02-04 PROCEDURE — A9270 NON-COVERED ITEM OR SERVICE: HCPCS | Mod: UD | Performed by: STUDENT IN AN ORGANIZED HEALTH CARE EDUCATION/TRAINING PROGRAM

## 2025-02-04 RX ORDER — DIAZEPAM 5 MG/1
5 TABLET ORAL
Status: DISCONTINUED | OUTPATIENT
Start: 2025-02-04 | End: 2025-02-04 | Stop reason: HOSPADM

## 2025-02-04 RX ORDER — MIDAZOLAM HYDROCHLORIDE 5 MG/ML
2 INJECTION INTRAMUSCULAR; INTRAVENOUS ONCE
Status: COMPLETED | OUTPATIENT
Start: 2025-02-04 | End: 2025-02-04

## 2025-02-04 RX ORDER — DIAZEPAM 5 MG/1
5 TABLET ORAL ONCE
Status: COMPLETED | OUTPATIENT
Start: 2025-02-04 | End: 2025-02-04

## 2025-02-04 RX ADMIN — DIAZEPAM 5 MG: 5 TABLET ORAL at 02:22

## 2025-02-04 RX ADMIN — MIDAZOLAM HYDROCHLORIDE 2 MG: 5 INJECTION, SOLUTION INTRAMUSCULAR; INTRAVENOUS at 02:22

## 2025-02-04 ASSESSMENT — FIBROSIS 4 INDEX: FIB4 SCORE: 0.89

## 2025-02-04 NOTE — ED NOTES
"Patient assessed by this RN. Placed on legal hold d/t stating he tried to overdose intentionally on alcohol, fentanyl and meth to \"end  it all.\" Patient reports having an extensive psych history. Patient changed into paper scrubs, room stripped for safety. 1:1 sitter in place and safety checklist used.   "

## 2025-02-04 NOTE — ED NOTES
Patient medicated per Mar, placed on monitor per ERP. Provided blankets for comfort. No further needs at this time.

## 2025-02-04 NOTE — DISCHARGE PLANNING
Alert Team:  Patient now is more sober and calmed down. Patient reports he is interested in going voluntarily to [facility] for detox. Patient is able to contract for safety to get over to [facility]. Denies SI/ HI. Transportation will be arranged with [facility]. Discussed with Dr Patel, patient does not meet criteria for L2K.

## 2025-02-04 NOTE — ED TRIAGE NOTES
"Chief Complaint   Patient presents with    Drug Ingestion     Pt states \"I overdosed on Vanessa and Meth and  a couple hours ago but I crawled my way back to life and brought myself here.\" Asked pt if he received Narcan prior to arrival and he states \"No, I was left for dead but I saved my own life.\"    Suicidal Ideation     Pt endorses SI and states \"I already did it tonight, I took all these drugs to kill myself and I succeeded\"     Pt ambulatory to triage for above complaint. Pt very anxious and upset in triage and has racing thoughts with pressured speech. Pt states he thinks that someone is outside the hospital and is coming in to get him but does not specify who it is.      Pt is alert/oriented and follows commands. Pt speaking in full sentences and responds mostly appropriately to questions. No acute distress noted in triage and respirations are even and unlabored.     Charge RN notified of pt and room received.    "

## 2025-02-04 NOTE — DISCHARGE PLANNING
Alert Team:    Cab voucher # [id number] provided to patient upon discharge fro transport over to [facility] for intake assessment/voluntary admission.

## 2025-02-04 NOTE — ED NOTES
Per Alert team, Patient Ok with going to [facility] voluntarily. Legal hold d/c per alert team and ERP. 1:1 sitter discontinued.

## 2025-02-04 NOTE — ED PROVIDER NOTES
"ED Provider Note    CHIEF COMPLAINT  Chief Complaint   Patient presents with    Drug Ingestion     Pt states \"I overdosed on Vanessa and Meth and  a couple hours ago but I crawled my way back to life and brought myself here.\" Asked pt if he received Narcan prior to arrival and he states \"No, I was left for dead but I saved my own life.\"    Suicidal Ideation     Pt endorses SI and states \"I already did it tonight, I took all these drugs to kill myself and I succeeded\"       EXTERNAL RECORDS REVIEWED  External ED Note [facility] ER visit on 2024 for SI, methamphetamine intoxication    HPI/ROS  LIMITATION TO HISTORY   Select: : None  OUTSIDE HISTORIAN(S):    Abdoul Median is a 34 y.o. male who presents with suicidal ideation.  Patient attempted to overdose on fentanyl and methamphetamine and drink reportedly.  Patient denies that he has slept in a week.  Patient reports prior suicide attempts.  Patient reports that he was sober for a year 2 months ago but then relapsed.  Patient denies any recent trauma or injury.    PAST MEDICAL HISTORY   has a past medical history of Acne (04/15/2013), Alcohol ingestion, more than 4 drinks/day (2016), Bipolar disorder (HCC), Depression, Methamphetamine abuse (HCC), PTSD (post-traumatic stress disorder), Schizophrenia ([facility]), and Warts (2013).    SURGICAL HISTORY   has a past surgical history that includes orif, fracture, clavicle (Left).    FAMILY HISTORY  Family History   Problem Relation Age of Onset    Cancer Neg Hx     Diabetes Neg Hx     Heart Disease Neg Hx        SOCIAL HISTORY  Social History     Tobacco Use    Smoking status: Every Day     Types: Cigarettes    Smokeless tobacco: Never   Vaping Use    Vaping status: Every Day   Substance and Sexual Activity    Alcohol use: Yes     Comment: \"1 gallon of whisky about everyday\"    Drug use: Yes     Types: Inhaled     Comment: marijuana, Meth (smoke only no injection), fentanyl    Sexual activity: Not on " "file       CURRENT MEDICATIONS  Home Medications       Reviewed by Regi Leyva R.N. (Registered Nurse) on 02/04/25 at 0037  Med List Status: Not Addressed     Medication Last Dose Status        Patient Michael Taking any Medications                           ALLERGIES  No Known Allergies    PHYSICAL EXAM  VITAL SIGNS: /56   Pulse 80   Temp 35.9 °C (96.7 °F) (Temporal)   Resp 16   Ht 1.753 m (5' 9\")   Wt 76.9 kg (169 lb 8.5 oz)   SpO2 95%   BMI 25.04 kg/m²    Physical Exam  Vitals and nursing note reviewed.   Constitutional:       Comments: Patient is lying in bed supine, pleasant, conversant, speaking in complete sentences, psychomotor agitation, rapid pressured speech   HENT:      Head: Normocephalic and atraumatic.   Eyes:      Extraocular Movements: Extraocular movements intact.      Conjunctiva/sclera: Conjunctivae normal.      Pupils: Pupils are equal, round, and reactive to light.   Cardiovascular:      Comments:   Pulmonary:      Effort: Pulmonary effort is normal. No respiratory distress.   Musculoskeletal:         General: No swelling. Normal range of motion.      Cervical back: Normal range of motion. No rigidity.   Skin:     Capillary Refill: Capillary refill takes less than 2 seconds.   Neurological:      Mental Status: He is alert.   Psychiatric:         Attention and Perception: Attention normal.         Mood and Affect: Mood is anxious. Affect is labile.         Speech: Speech is rapid and pressured.         Behavior: Behavior is agitated and hyperactive. Behavior is cooperative.         Thought Content: Thought content includes suicidal ideation.           COURSE & MEDICAL DECISION MAKING    ASSESSMENT, COURSE AND PLAN  Care Narrative: No evidence of trauma necessitating imaging at this time.  Patient demonstrates evidence of sympathomimetic toxidrome including tachycardia, psychomotor agitation, will be treated with benzodiazepines.  Patient has been off his Zyprexa, this will " be restarted once he has stabilized.  Patient will be evaluated by alert team for suicidality once medically cleared.  Mental health hold has been placed due to concern for patient's safety.    Electronically signed by: Danny Patel M.D., 2/4/2025 2:36 AM    [facility] alert team RN has evaluated the patient and patient is no longer suicidal.  He has contracted for safety.  He would like to go to [facility] at this time for methamphetamine dependence.  He has no intention to hurt himself at this time.  I think this is a reasonable plan.  He would like to check himself in voluntarily to [facility].    This dictation has been created using voice recognition software. I am continuously working with the software to minimize the number of voice recognition errors and I have made every attempt to manually correct the errors within my dictation. However errors  related to this voice recognition software may still exist and should be interpreted within the appropriate context.     Electronically signed by: Danny Patel M.D., 2/4/2025 5:57 AM          DISPOSITION AND DISCUSSIONS    Discussion of management with other HP or appropriate source(s): Behavioral Health alert team behavioral health RN [facility]          FINAL DIAGNOSIS  1. Suicidal ideation    2. Intentional drug overdose, initial encounter (HCC)    3. Methamphetamine intoxication (HCC)         Electronically signed by: Danny Patel M.D., 2/4/2025 2:33 AM

## 2025-02-04 NOTE — ED NOTES
Patient ambulated with steady gait and all personal belongings to ED lobby. Awaiting taxi to [facility]

## 2025-02-04 NOTE — ED NOTES
POC breathalizer: 0.00   Scribe Attestation (For Scribes USE Only)... Attending Attestation (For Attendings USE Only).../Scribe Attestation (For Scribes USE Only)...

## 2025-02-04 NOTE — ED NOTES
Alert Team:  Attempted to complete behavioral health assessment. Patient reports meth, fentanyl, and a gallon of whiskey 4 hours ago. Patient appears to be intoxicated. Assessment will be completed once patient is more sober.